# Patient Record
Sex: FEMALE | Race: WHITE | NOT HISPANIC OR LATINO | RURAL
[De-identification: names, ages, dates, MRNs, and addresses within clinical notes are randomized per-mention and may not be internally consistent; named-entity substitution may affect disease eponyms.]

---

## 2024-10-21 ENCOUNTER — HOSPITAL ENCOUNTER (OUTPATIENT)
Dept: RADIOLOGY | Facility: HOSPITAL | Age: 84
Discharge: HOME | End: 2024-10-21
Attending: FAMILY MEDICINE
Payer: MEDICARE

## 2024-10-21 DIAGNOSIS — R91.1 SOLITARY PULMONARY NODULE: ICD-10-CM

## 2024-10-21 PROCEDURE — 71250 CT THORAX DX C-: CPT | Mod: TC

## 2024-10-21 PROCEDURE — 71250 CT THORAX DX C-: CPT | Mod: 26,,, | Performed by: RADIOLOGY

## 2024-12-24 ENCOUNTER — HOSPITAL ENCOUNTER (INPATIENT)
Facility: HOSPITAL | Age: 84
LOS: 39 days | Discharge: HOME | DRG: 561 | End: 2025-02-01
Attending: FAMILY MEDICINE | Admitting: OBSTETRICS & GYNECOLOGY
Payer: MEDICARE

## 2024-12-24 DIAGNOSIS — R53.1 GENERALIZED WEAKNESS: ICD-10-CM

## 2024-12-24 DIAGNOSIS — M62.81 MUSCULAR WEAKNESS: Primary | ICD-10-CM

## 2024-12-24 DIAGNOSIS — S72.001A CLOSED RIGHT HIP FRACTURE, INITIAL ENCOUNTER: ICD-10-CM

## 2024-12-24 LAB
ANION GAP SERPL CALCULATED.3IONS-SCNC: 12 MMOL/L (ref 7–16)
BASOPHILS # BLD AUTO: 0.04 K/UL (ref 0–0.2)
BASOPHILS NFR BLD AUTO: 0.5 % (ref 0–1)
BUN SERPL-MCNC: 19 MG/DL (ref 10–20)
BUN/CREAT SERPL: 27 (ref 6–20)
CALCIUM SERPL-MCNC: 9.2 MG/DL (ref 8.4–10.2)
CHLORIDE SERPL-SCNC: 102 MMOL/L (ref 98–107)
CO2 SERPL-SCNC: 29 MMOL/L (ref 23–31)
CREAT SERPL-MCNC: 0.7 MG/DL (ref 0.55–1.02)
DIFFERENTIAL METHOD BLD: ABNORMAL
EGFR (NO RACE VARIABLE) (RUSH/TITUS): 85 ML/MIN/1.73M2
EOSINOPHIL # BLD AUTO: 0.21 K/UL (ref 0–0.5)
EOSINOPHIL NFR BLD AUTO: 2.6 % (ref 1–4)
ERYTHROCYTE [DISTWIDTH] IN BLOOD BY AUTOMATED COUNT: 13.8 % (ref 11.5–14.5)
GLUCOSE SERPL-MCNC: 100 MG/DL (ref 82–115)
HCT VFR BLD AUTO: 32 % (ref 38–47)
HGB BLD-MCNC: 10.6 G/DL (ref 12–16)
IMM GRANULOCYTES # BLD AUTO: 0.03 K/UL (ref 0–0.04)
IMM GRANULOCYTES NFR BLD: 0.4 % (ref 0–0.4)
LYMPHOCYTES # BLD AUTO: 1.07 K/UL (ref 1–4.8)
LYMPHOCYTES NFR BLD AUTO: 13.3 % (ref 27–41)
MCH RBC QN AUTO: 29.9 PG (ref 27–31)
MCHC RBC AUTO-ENTMCNC: 33.1 G/DL (ref 32–36)
MCV RBC AUTO: 90.1 FL (ref 80–96)
MONOCYTES # BLD AUTO: 0.58 K/UL (ref 0–0.8)
MONOCYTES NFR BLD AUTO: 7.2 % (ref 2–6)
MPC BLD CALC-MCNC: 10 FL (ref 9.4–12.4)
NEUTROPHILS # BLD AUTO: 6.09 K/UL (ref 1.8–7.7)
NEUTROPHILS NFR BLD AUTO: 76 % (ref 53–65)
NRBC # BLD AUTO: 0 X10E3/UL
NRBC, AUTO (.00): 0 %
PLATELET # BLD AUTO: 188 K/UL (ref 150–400)
POTASSIUM SERPL-SCNC: 3.9 MMOL/L (ref 3.5–5.1)
RBC # BLD AUTO: 3.55 M/UL (ref 4.2–5.4)
SODIUM SERPL-SCNC: 139 MMOL/L (ref 136–145)
WBC # BLD AUTO: 8.02 K/UL (ref 4.5–11)

## 2024-12-24 PROCEDURE — 80048 BASIC METABOLIC PNL TOTAL CA: CPT | Performed by: OBSTETRICS & GYNECOLOGY

## 2024-12-24 PROCEDURE — 94761 N-INVAS EAR/PLS OXIMETRY MLT: CPT

## 2024-12-24 PROCEDURE — 94799 UNLISTED PULMONARY SVC/PX: CPT

## 2024-12-24 PROCEDURE — 36415 COLL VENOUS BLD VENIPUNCTURE: CPT | Performed by: OBSTETRICS & GYNECOLOGY

## 2024-12-24 PROCEDURE — 99900035 HC TECH TIME PER 15 MIN (STAT)

## 2024-12-24 PROCEDURE — 85025 COMPLETE CBC W/AUTO DIFF WBC: CPT | Performed by: OBSTETRICS & GYNECOLOGY

## 2024-12-24 PROCEDURE — 11000004 HC SNF PRIVATE

## 2024-12-24 RX ORDER — ACETAMINOPHEN 325 MG/1
650 TABLET ORAL EVERY 6 HOURS PRN
Status: DISCONTINUED | OUTPATIENT
Start: 2024-12-24 | End: 2025-02-01 | Stop reason: HOSPADM

## 2024-12-24 RX ORDER — ACETAMINOPHEN 500 MG
2 TABLET ORAL EVERY 8 HOURS
COMMUNITY
Start: 2024-12-24

## 2024-12-24 RX ORDER — POLYETHYLENE GLYCOL 3350 17 G/17G
17 POWDER, FOR SOLUTION ORAL DAILY
COMMUNITY
Start: 2024-02-02

## 2024-12-24 RX ORDER — CALCIUM CARBONATE 200(500)MG
500 TABLET,CHEWABLE ORAL 2 TIMES DAILY PRN
Status: DISCONTINUED | OUTPATIENT
Start: 2024-12-24 | End: 2025-02-01 | Stop reason: HOSPADM

## 2024-12-24 RX ORDER — AZITHROMYCIN 250 MG/1
250 TABLET, FILM COATED ORAL
Status: ON HOLD | COMMUNITY
Start: 2024-12-09 | End: 2024-12-25 | Stop reason: CLARIF

## 2024-12-24 RX ORDER — DOXYCYCLINE 100 MG/1
100 CAPSULE ORAL EVERY 12 HOURS
Status: ON HOLD | COMMUNITY
Start: 2024-12-25 | End: 2025-01-31 | Stop reason: HOSPADM

## 2024-12-24 RX ORDER — APIXABAN 2.5 MG/1
1 TABLET, FILM COATED ORAL 2 TIMES DAILY
COMMUNITY
Start: 2024-02-01

## 2024-12-24 RX ORDER — ROSUVASTATIN CALCIUM 5 MG/1
1 TABLET, COATED ORAL DAILY
COMMUNITY

## 2024-12-24 RX ORDER — MULTIVITAMIN
1 TABLET ORAL DAILY
COMMUNITY

## 2024-12-24 RX ORDER — NAPROXEN SODIUM 220 MG/1
1 TABLET, FILM COATED ORAL DAILY
COMMUNITY
Start: 2024-02-02

## 2024-12-24 RX ORDER — TALC
6 POWDER (GRAM) TOPICAL NIGHTLY PRN
Status: DISCONTINUED | OUTPATIENT
Start: 2024-12-24 | End: 2025-02-01 | Stop reason: HOSPADM

## 2024-12-24 RX ORDER — AMOXICILLIN 250 MG
1 CAPSULE ORAL 2 TIMES DAILY
Status: DISCONTINUED | OUTPATIENT
Start: 2024-12-24 | End: 2025-01-14

## 2024-12-24 RX ORDER — DONEPEZIL HYDROCHLORIDE 5 MG/1
1 TABLET, FILM COATED ORAL NIGHTLY
COMMUNITY
Start: 2024-02-01

## 2024-12-24 NOTE — H&P
[facility]  Medical Surgical Unit    History & Physical      Patient Name: Vy Hussein  MRN: [id number]  Admission Date: 12/24/2024  Attending Physician:  Dr Castro  Primary Care Provider: Flora Brennan MD         Patient information was obtained from patient, relative(s), and past medical records.     Subjective:     Principal Problem:<principal problem not specified>  Presents for swing bed therapy.  Chief Complaint: No chief complaint on file.   Recovering from ortho procedure 12/22/24 [facility]    HPI: 85yo who had a closed right hip fracture s/p ground level fall on 12/21/24 and subsequent   Femoral trochanteric nailing 12/22/24 presenting for swing bed.  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     Current Outpatient Medications on File Prior to Encounter   Medication Sig    acetaminophen (TYLENOL) 500 MG tablet Take 2 tablets by mouth every 8 (eight) hours.    aspirin 81 MG Chew Take 1 tablet by mouth once daily.    azithromycin (Z-KYLE) 250 MG tablet Take 250 mg by mouth every Mon, Wed, Fri.    donepeziL (ARICEPT) 5 MG tablet Take 1 tablet by mouth every evening.    ELIQUIS 2.5 mg Tab Take 1 tablet by mouth 2 (two) times daily.    polyethylene glycol (GLYCOLAX) 17 gram PwPk Take 17 g by mouth once daily.    multivitamin (THERAGRAN) per tablet Take 1 tablet by mouth once daily.    rosuvastatin  (CRESTOR) 5 MG tablet Take 1 tablet by mouth once daily.     Family History    None       Tobacco Use    Smoking status: Never    Smokeless tobacco: Never   Substance and Sexual Activity    Alcohol use: Never    Drug use: Never    Sexual activity: Not Currently     Review of Systems   Constitutional: Negative.    HENT: Negative.     Eyes: Negative.    Respiratory: Negative.     Cardiovascular: Negative.    Gastrointestinal: Negative.    Endocrine: Negative.    Genitourinary: Negative.    Musculoskeletal:  Positive for gait problem (right hip surgery).   Skin: Negative.    Allergic/Immunologic: Negative.    Neurological:  Positive for speech difficulty (expressive aphasia). Negative for dizziness, facial asymmetry, numbness and headaches.     Objective:     Vital Signs (Most Recent):  Temp: 98.1 °F (36.7 °C) (12/24/24 1530)  Pulse: 86 (12/24/24 1530)  Resp: 18 (12/24/24 1530)  BP: 123/74 (12/24/24 1530)  SpO2: 96 % (12/24/24 1530) Vital Signs (24h Range):  Temp:  [98.1 °F (36.7 °C)] 98.1 °F (36.7 °C)  Pulse:  [86] 86  Resp:  [18] 18  SpO2:  [96 %] 96 %  BP: (123)/(74) 123/74     Weight: 43 kg (94 lb 12.8 oz)  Body mass index is 15.3 kg/m².    Physical Exam  Vitals and nursing note reviewed.   HENT:      Head: Normocephalic and atraumatic.      Nose: Nose normal.      Mouth/Throat:      Mouth: Mucous membranes are moist.   Eyes:      Conjunctiva/sclera: Conjunctivae normal.      Pupils: Pupils are equal, round, and reactive to light.   Cardiovascular:      Rate and Rhythm: Rhythm irregular.      Pulses: Normal pulses.   Pulmonary:      Effort: Pulmonary effort is normal.   Abdominal:      General: Abdomen is flat.   Musculoskeletal:      Cervical back: Neck supple.      Comments: Right femoral trochanteric nailing  for right hip trochanteric femur fracture.   Skin:     General: Skin is warm.   Neurological:      Mental Status: She is alert.      Comments: Expressive aphasia     Judd to gravity      CRANIAL NERVES      CN III, IV, VI   Pupils are equal, round, and reactive to light.      Significant Labs: All pertinent labs within the past 24 hours have been reviewed.    Significant Imaging: I have reviewed all pertinent imaging results/findings within the past 24 hours.    Assessment/Plan:   Post op  day # 2. S/p right femoral trochanteric nailing  There are no hospital problems to display for this patient.    VTE Risk Mitigation (From admission, onward)           Ordered     IP VTE HIGH RISK PATIENT  Once         12/24/24 1544     Place COLT hose  Until discontinued         12/24/24 1544                Admit for swing bed eval and therapy.      Elizabeth Castro MD  Department of Hospital Medicine   [facility] - Medical Surgical Unit

## 2024-12-25 PROBLEM — M62.81 MUSCULAR WEAKNESS: Status: ACTIVE | Noted: 2024-12-25

## 2024-12-25 LAB
AMORPH PHOS CRY #/AREA URNS LPF: ABNORMAL /LPF
BACTERIA #/AREA URNS HPF: ABNORMAL /HPF
BILIRUB UR QL STRIP: NEGATIVE
CLARITY UR: CLEAR
COLOR UR: YELLOW
GLUCOSE UR STRIP-MCNC: NEGATIVE MG/DL
KETONES UR STRIP-SCNC: NEGATIVE MG/DL
LEUKOCYTE ESTERASE UR QL STRIP: ABNORMAL
NITRITE UR QL STRIP: NEGATIVE
PH UR STRIP: 7 PH UNITS
PROT UR QL STRIP: NEGATIVE
RBC # UR STRIP: NEGATIVE /UL
RBC #/AREA URNS HPF: ABNORMAL /HPF
SP GR UR STRIP: 1.01
SQUAMOUS #/AREA URNS LPF: ABNORMAL /LPF
UROBILINOGEN UR STRIP-ACNC: 0.2 MG/DL
WBC #/AREA URNS HPF: ABNORMAL /HPF

## 2024-12-25 PROCEDURE — 94761 N-INVAS EAR/PLS OXIMETRY MLT: CPT

## 2024-12-25 PROCEDURE — 11000004 HC SNF PRIVATE

## 2024-12-25 PROCEDURE — 87086 URINE CULTURE/COLONY COUNT: CPT | Performed by: OBSTETRICS & GYNECOLOGY

## 2024-12-25 PROCEDURE — 81003 URINALYSIS AUTO W/O SCOPE: CPT | Performed by: OBSTETRICS & GYNECOLOGY

## 2024-12-25 PROCEDURE — 25000003 PHARM REV CODE 250: Performed by: OBSTETRICS & GYNECOLOGY

## 2024-12-25 RX ORDER — POLYETHYLENE GLYCOL 3350 17 G/17G
17 POWDER, FOR SOLUTION ORAL DAILY
Status: DISCONTINUED | OUTPATIENT
Start: 2024-12-25 | End: 2025-01-14

## 2024-12-25 RX ORDER — ATORVASTATIN CALCIUM 20 MG/1
20 TABLET, FILM COATED ORAL DAILY
Status: DISCONTINUED | OUTPATIENT
Start: 2024-12-25 | End: 2025-02-01 | Stop reason: HOSPADM

## 2024-12-25 RX ORDER — DOXYCYCLINE HYCLATE 100 MG
100 TABLET ORAL EVERY 12 HOURS
Status: DISCONTINUED | OUTPATIENT
Start: 2024-12-25 | End: 2025-01-06 | Stop reason: ALTCHOICE

## 2024-12-25 RX ORDER — DONEPEZIL HYDROCHLORIDE 5 MG/1
5 TABLET, FILM COATED ORAL NIGHTLY
Status: DISCONTINUED | OUTPATIENT
Start: 2024-12-25 | End: 2025-02-01 | Stop reason: HOSPADM

## 2024-12-25 RX ORDER — MUPIROCIN 20 MG/G
OINTMENT TOPICAL 2 TIMES DAILY
Status: DISPENSED | OUTPATIENT
Start: 2024-12-25 | End: 2024-12-30

## 2024-12-25 RX ORDER — ACETAMINOPHEN 500 MG
1000 TABLET ORAL EVERY 8 HOURS
Status: DISCONTINUED | OUTPATIENT
Start: 2024-12-25 | End: 2024-12-28

## 2024-12-25 RX ORDER — NAPROXEN SODIUM 220 MG/1
81 TABLET, FILM COATED ORAL DAILY
Status: DISCONTINUED | OUTPATIENT
Start: 2024-12-26 | End: 2025-02-01 | Stop reason: HOSPADM

## 2024-12-25 RX ADMIN — DOXYCYCLINE HYCLATE 100 MG: 100 TABLET, COATED ORAL at 02:12

## 2024-12-25 RX ADMIN — ATORVASTATIN CALCIUM 20 MG: 20 TABLET, FILM COATED ORAL at 02:12

## 2024-12-25 RX ADMIN — DONEPEZIL HYDROCHLORIDE 5 MG: 5 TABLET ORAL at 09:12

## 2024-12-25 RX ADMIN — APIXABAN 2.5 MG: 2.5 TABLET, FILM COATED ORAL at 02:12

## 2024-12-25 RX ADMIN — SENNOSIDES AND DOCUSATE SODIUM 1 TABLET: 50; 8.6 TABLET ORAL at 09:12

## 2024-12-25 RX ADMIN — MUPIROCIN 1 G: 20 OINTMENT TOPICAL at 09:12

## 2024-12-25 RX ADMIN — APIXABAN 2.5 MG: 2.5 TABLET, FILM COATED ORAL at 09:12

## 2024-12-25 RX ADMIN — ACETAMINOPHEN 1000 MG: 500 TABLET ORAL at 02:12

## 2024-12-25 RX ADMIN — DOXYCYCLINE HYCLATE 100 MG: 100 TABLET, COATED ORAL at 09:12

## 2024-12-25 NOTE — PLAN OF CARE
Problem: Adult Inpatient Plan of Care  Goal: Plan of Care Review  Outcome: Progressing  Goal: Patient-Specific Goal (Individualized)  Outcome: Progressing  Goal: Absence of Hospital-Acquired Illness or Injury  Outcome: Progressing  Goal: Optimal Comfort and Wellbeing  Outcome: Progressing  Goal: Readiness for Transition of Care  Outcome: Progressing     Problem: Skin Injury Risk Increased  Goal: Skin Health and Integrity  Outcome: Progressing     Problem: Fall Injury Risk  Goal: Absence of Fall and Fall-Related Injury  Outcome: Progressing     Problem: Infection  Goal: Absence of Infection Signs and Symptoms  Outcome: Progressing

## 2024-12-25 NOTE — NURSING
Rec'd patient lying in bed with her eyes open. Patient has expressive aphasia, but is able to make her needs known with a little patience. She gets aggravated sometimes, but she is able to do it with a little time. Patient apologized for bothering us. She was reassured that she was not a bother, and she was re instructed on the use of the call bell, and how to get help if she needs it. Patient's leg was repositioned because she stated it was hurting on the outside. Patient left comfortable. Legs elevated to help ease pain. Call bell within reach. Safety measures and care ongoing.

## 2024-12-25 NOTE — NURSING
Simpson cath removed at this time so that urine specimen may be collected. Current simpson cath reportedly inserted on 12/21 at previous facility for urinary retention. Pt unable to confirm this, she states she was ambulating and voiding independently before her fall. Encouraged pt to increase water intake. Ice and extra bottles of water provided. Nurse explained to pt reason for needing UA for SB admission protocol, pt verbalized understanding.

## 2024-12-26 LAB
ANION GAP SERPL CALCULATED.3IONS-SCNC: 10 MMOL/L (ref 7–16)
BASOPHILS # BLD AUTO: 0.07 K/UL (ref 0–0.2)
BASOPHILS NFR BLD AUTO: 0.9 % (ref 0–1)
BUN SERPL-MCNC: 13 MG/DL (ref 10–20)
BUN/CREAT SERPL: 20 (ref 6–20)
CALCIUM SERPL-MCNC: 9.3 MG/DL (ref 8.4–10.2)
CHLORIDE SERPL-SCNC: 107 MMOL/L (ref 98–107)
CO2 SERPL-SCNC: 29 MMOL/L (ref 23–31)
CREAT SERPL-MCNC: 0.66 MG/DL (ref 0.55–1.02)
DIFFERENTIAL METHOD BLD: ABNORMAL
EGFR (NO RACE VARIABLE) (RUSH/TITUS): 87 ML/MIN/1.73M2
EOSINOPHIL # BLD AUTO: 0.24 K/UL (ref 0–0.5)
EOSINOPHIL NFR BLD AUTO: 3 % (ref 1–4)
ERYTHROCYTE [DISTWIDTH] IN BLOOD BY AUTOMATED COUNT: 13.9 % (ref 11.5–14.5)
GLUCOSE SERPL-MCNC: 99 MG/DL (ref 82–115)
HCT VFR BLD AUTO: 33 % (ref 38–47)
HGB BLD-MCNC: 10.9 G/DL (ref 12–16)
IMM GRANULOCYTES # BLD AUTO: 0.03 K/UL (ref 0–0.04)
IMM GRANULOCYTES NFR BLD: 0.4 % (ref 0–0.4)
LYMPHOCYTES # BLD AUTO: 1.17 K/UL (ref 1–4.8)
LYMPHOCYTES NFR BLD AUTO: 14.5 % (ref 27–41)
MAGNESIUM SERPL-MCNC: 2 MG/DL (ref 1.6–2.6)
MCH RBC QN AUTO: 29.5 PG (ref 27–31)
MCHC RBC AUTO-ENTMCNC: 33 G/DL (ref 32–36)
MCV RBC AUTO: 89.2 FL (ref 80–96)
MONOCYTES # BLD AUTO: 0.57 K/UL (ref 0–0.8)
MONOCYTES NFR BLD AUTO: 7.1 % (ref 2–6)
MPC BLD CALC-MCNC: 9.9 FL (ref 9.4–12.4)
NEUTROPHILS # BLD AUTO: 5.99 K/UL (ref 1.8–7.7)
NEUTROPHILS NFR BLD AUTO: 74.1 % (ref 53–65)
NRBC # BLD AUTO: 0 X10E3/UL
NRBC, AUTO (.00): 0 %
PHOSPHATE SERPL-MCNC: 2.6 MG/DL (ref 2.3–4.7)
PLATELET # BLD AUTO: 255 K/UL (ref 150–400)
POTASSIUM SERPL-SCNC: 3.8 MMOL/L (ref 3.5–5.1)
RBC # BLD AUTO: 3.7 M/UL (ref 4.2–5.4)
SODIUM SERPL-SCNC: 142 MMOL/L (ref 136–145)
WBC # BLD AUTO: 8.07 K/UL (ref 4.5–11)

## 2024-12-26 PROCEDURE — 92522 EVALUATE SPEECH PRODUCTION: CPT

## 2024-12-26 PROCEDURE — 84100 ASSAY OF PHOSPHORUS: CPT | Performed by: OBSTETRICS & GYNECOLOGY

## 2024-12-26 PROCEDURE — 85025 COMPLETE CBC W/AUTO DIFF WBC: CPT | Performed by: OBSTETRICS & GYNECOLOGY

## 2024-12-26 PROCEDURE — 36415 COLL VENOUS BLD VENIPUNCTURE: CPT | Performed by: OBSTETRICS & GYNECOLOGY

## 2024-12-26 PROCEDURE — 97166 OT EVAL MOD COMPLEX 45 MIN: CPT

## 2024-12-26 PROCEDURE — 83735 ASSAY OF MAGNESIUM: CPT | Performed by: OBSTETRICS & GYNECOLOGY

## 2024-12-26 PROCEDURE — 25000003 PHARM REV CODE 250: Performed by: INTERNAL MEDICINE

## 2024-12-26 PROCEDURE — 11000004 HC SNF PRIVATE

## 2024-12-26 PROCEDURE — 80048 BASIC METABOLIC PNL TOTAL CA: CPT | Performed by: OBSTETRICS & GYNECOLOGY

## 2024-12-26 PROCEDURE — 94761 N-INVAS EAR/PLS OXIMETRY MLT: CPT

## 2024-12-26 PROCEDURE — 25000003 PHARM REV CODE 250: Performed by: OBSTETRICS & GYNECOLOGY

## 2024-12-26 PROCEDURE — 97161 PT EVAL LOW COMPLEX 20 MIN: CPT

## 2024-12-26 RX ORDER — HYDROCORTISONE 25 MG/G
CREAM TOPICAL 2 TIMES DAILY
Status: DISCONTINUED | OUTPATIENT
Start: 2024-12-26 | End: 2025-02-01 | Stop reason: HOSPADM

## 2024-12-26 RX ORDER — KETOROLAC TROMETHAMINE 15 MG/ML
15 INJECTION, SOLUTION INTRAMUSCULAR; INTRAVENOUS EVERY 6 HOURS PRN
Status: DISCONTINUED | OUTPATIENT
Start: 2024-12-26 | End: 2024-12-27

## 2024-12-26 RX ADMIN — DOXYCYCLINE HYCLATE 100 MG: 100 TABLET, COATED ORAL at 09:12

## 2024-12-26 RX ADMIN — HYDROCORTISONE: 25 CREAM TOPICAL at 09:12

## 2024-12-26 RX ADMIN — POLYETHYLENE GLYCOL 3350 17 G: 17 POWDER, FOR SOLUTION ORAL at 09:12

## 2024-12-26 RX ADMIN — APIXABAN 2.5 MG: 2.5 TABLET, FILM COATED ORAL at 09:12

## 2024-12-26 RX ADMIN — SENNOSIDES AND DOCUSATE SODIUM 1 TABLET: 50; 8.6 TABLET ORAL at 09:12

## 2024-12-26 RX ADMIN — DONEPEZIL HYDROCHLORIDE 5 MG: 5 TABLET ORAL at 09:12

## 2024-12-26 RX ADMIN — MUPIROCIN 2 G: 20 OINTMENT TOPICAL at 09:12

## 2024-12-26 RX ADMIN — ATORVASTATIN CALCIUM 20 MG: 20 TABLET, FILM COATED ORAL at 09:12

## 2024-12-26 RX ADMIN — ASPIRIN 81 MG CHEWABLE TABLET 81 MG: 81 TABLET CHEWABLE at 09:12

## 2024-12-26 RX ADMIN — THERA TABS 1 TABLET: TAB at 09:12

## 2024-12-26 RX ADMIN — MUPIROCIN 1 G: 20 OINTMENT TOPICAL at 09:12

## 2024-12-26 NOTE — PT/OT/SLP EVAL
"Speech Language Pathology Evaluation  Cognitive Communication    Patient Name:  Vy Hussein   MRN:  [id number]  Admitting Diagnosis: Muscular weakness    Recommendations:     Recommendations:                General Recommendations:  Speech/language therapy  Diet recommendations:     chopped meats  Aspiration Precautions:  none    General Precautions: Standard,    Communication strategies:  provide increased time to answer, go to room if call light pushed, and pt talks with slow speech    Assessment:     Vy Hussein is a 84 y.o. female with an SLP diagnosis of Aphasia.  She presents with expressive aphasia due to a stroke in 2024.    History:     Past Medical History:   Diagnosis Date    A-fib     Aneurysm of carotid artery     Stroke        Past Surgical History:   Procedure Laterality Date    APPENDECTOMY      FRACTURE SURGERY Left     hand    HYSTERECTOMY      JOINT REPLACEMENT         Social History: Patient lives by self. No children; has a nephew Solo    Prior Intubation HX:  na    Modified Barium Swallow: na    Chest X-Rays: see chart    Prior diet: regular diet.    Occupation/hobbies/homemaking: .being with family,       Subjective       Patient goals:  Patient will be Independently oriented x4, 5/5 therapy sessions.   Patient will answer "wh" questions with 80% accuracy Independently.   Patient will complete word finding tasks with 80% accuracy Independently.   Patient will complete divergent naming tasks with 80% accuracy Independently.   Patient will complete convergent naming tasks with 80% accuracy Independently.   Patient will complete problem solving tasks with 80% accuracy Independently.   Patient will complete verbal reasoning tasks with 80% accuracy Independently.   Patient will complete short term memory tasks with 80% accuracy Independently.     Pain/Comfort:  Pain Rating 1: 0/10    Respiratory Status: Room air    Objective:     Cognitive Status:    Memory Delayed   Problem Solving " Conclusions         Receptive Language:   Comprehension:      Conversation      Pragmatics:        Expressive Language:  Verbal:    Automatic Speech  Sentence completion    Initiation    Repetition Phrases    Naming Confrontation    Sentence formulation    Nonverbal:   Mouth        Motor Speech:  Resonance      Voice:   Quality Hoarse    Visual-Spatial:  WFL    Reading:   WFL     Written Expression:   Right handed; difficulty with writing    Treatment: 3x week    Goals:   Multidisciplinary Problems       SLP Goals       Not on file                    Plan:   Patient to be seen:  3 x/week   Plan of Care expires:  01/13/25  Plan of Care reviewed with:  patient   SLP Follow-Up:  Yes       Discharge recommendations:  Therapy Intensity Recommendations at Discharge: Moderate Intensity Therapy   Barriers to Discharge:  Level of Skilled Assistance Needed   and Safety Awareness      Time Tracking:     SLP Treatment Date:      Speech Start Time:     Speech Stop Time:        Speech Total Time (min):       Billable Minutes: Eval       12/26/2024  Jenni Corado MSCCC-SLP

## 2024-12-26 NOTE — PT/OT/SLP EVAL
Physical Therapy Evaluation    Patient Name:  Vy Hussein   MRN:  [id number]    Recommendations:     Discharge Recommendations: Low Intensity Therapy   Discharge Equipment Recommendations: bedside commode   Barriers to discharge: Decreased caregiver support    Assessment:     Vy Hussein is a 84 y.o. female admitted with a medical diagnosis of Muscular weakness.  She presents with the following impairments/functional limitations: weakness, impaired endurance, gait instability, decreased coordination, decreased lower extremity function, pain, decreased ROM, impaired balance, impaired functional mobility. Pt has limited mobility at this time. She claims that she was able to walk a little bit prior to leaving [place] but has not walked since being admitted here at [facility]. She is able to ambulate about 3 feet with RW and CGA taking very small, choppy steps. She seems motivated to get better as she will return to home alone (possibly have sitter/helper, though).     Rehab Prognosis: Good and Fair; patient would benefit from acute skilled PT services to address these deficits and reach maximum level of function.    Recent Surgery: * No surgery found *      Plan:     During this hospitalization, patient to be seen 5 x/week to address the identified rehab impairments via gait training, therapeutic activities, therapeutic exercises, neuromuscular re-education and progress toward the following goals:    Plan of Care Expires:  01/14/25    Subjective     Chief Complaint: decreased strength and mobility  Patient/Family Comments/goals: increase independence to return home  Pain/Comfort:  Pain Rating 1: 3/10  Location - Side 1: Right  Location - Orientation 1: lower  Location 1: hip  Pain Addressed 1: Pre-medicate for activity, Cessation of Activity, Reposition  Pain Rating Post-Intervention 1: 3/10    Patients cultural, spiritual, Cheondoism conflicts given the current situation: no    Living Environment:  Lives alone in  home with steps leading into home, handrail present on steps.  Prior to admission, patients level of function was independent.  Equipment used at home: walker, rolling, shower chair.  DME owned (not currently used): rolling walker and shower chair.  Upon discharge, patient will have assistance from sitter/helper several times a week.    Objective:     Communicated with LPN prior to session.  Patient found HOB elevated with bed alarm  upon PT entry to room.    General Precautions: Standard, fall  Orthopedic Precautions:RLE weight bearing as tolerated   Braces: N/A  Respiratory Status: Room air    Exams:  RLE ROM: limited hip flexion secondary to surgery; knee and ankle WFL  RLE Strength: 3/5 hip flexion, 3+/5 knee ext, 4/5 knee flexion, 4/5 ankle DF  LLE ROM: WFL  LLE Strength: WFL    Functional Mobility:  Bed Mobility:     Rolling Right: moderate assistance  Scooting: moderate assistance  Bridging: independence  Supine to Sit: moderate assistance  Sit to Supine: moderate assistance  Transfers:     Sit to Stand:  moderate assistance with rolling walker  Gait: about 3 feet with RW and CGA      AM-PAC 6 CLICK MOBILITY  Total Score:11       Treatment & Education:  1 low complexity PT evaluation    Patient left HOB elevated with call button in reach and bed alarm on.    GOALS:   Multidisciplinary Problems       Physical Therapy Goals          Problem: Physical Therapy    Goal Priority Disciplines Outcome Interventions   Physical Therapy Goal     PT, PT/OT     Description: Short term goals  1. Pt will require min assist for bed mobility  2. Pt will require min assist for sit to stand transfer   3. Pt will ambulate 25 feet with RW and CGA    Long term goals  1. Pt will be independent with all bed mobility  2. Pt will be independent/mod independent with sit to stand transfer  3. Pt will perform 5 sit to stands with only 1 UE for assistance  4. Pt will ambulate 50 feet with RW and CGA  5. Pt will be independent with  ascending and descending 2 standard height steps with handrail                       History:     Past Medical History:   Diagnosis Date    A-fib     Aneurysm of carotid artery     Stroke        Past Surgical History:   Procedure Laterality Date    APPENDECTOMY      FRACTURE SURGERY Left     hand    HYSTERECTOMY      JOINT REPLACEMENT         Time Tracking:     PT Received On: 12/26/24  PT Start Time: 0912     PT Stop Time: 0938  PT Total Time (min): 26 min     Billable Minutes: Evaluation 26 minutes    Zbigniew Corado, PT, DPT    12/26/2024

## 2024-12-26 NOTE — PLAN OF CARE
[facility] - Medical Surgical Unit  Initial Discharge Assessment       Primary Care Provider: Flora Brennan MD    Admission Diagnosis: Generalized weakness [R53.1]    Admission Date: 12/24/2024  Expected Discharge Date: 1/13/2025    Transition of Care Barriers: (P) None    Payor: MEDICARE / Plan: MEDICARE PART A & B / Product Type: Government /     Extended Emergency Contact Information  Primary Emergency Contact: PRACHI TATE  Mobile Phone: [phone]  Relation: Relative  Preferred language: English   needed? No    Discharge Plan A: (P) Home, Home Health  Discharge Plan B: (P) Home with family, Home Health    No Pharmacies Listed    Initial Assessment (most recent)       Adult Discharge Assessment - 12/26/24 0900          Discharge Assessment    Assessment Type Discharge Planning Assessment (P)      Confirmed/corrected address, phone number and insurance Yes (P)      Confirmed Demographics Correct on Facesheet (P)      Source of Information family;health record (P)      Reason For Admission right hip fx (P)      People in Home other relative(s) (P)      Facility Arrived From: [facility] (P)      Do you expect to return to your current living situation? Yes (P)      Do you have help at home or someone to help you manage your care at home? Yes (P)      Who are your caregiver(s) and their phone number(s)? Prachi Hussein(nephew) [phone] (P)      Prior to hospitilization cognitive status: Unable to Assess (P)      Current cognitive status: Alert/Oriented (P)      Walking or Climbing Stairs Difficulty no (P)      Dressing/Bathing Difficulty no (P)      Home Accessibility stairs to enter home (P)      Number of Stairs, Main Entrance three (P)      Home Layout Able to live on 1st floor (P)      Equipment Currently Used at Home shower chair (P)      Readmission within 30 days? No (P)      Patient currently being followed by outpatient case management? No (P)      Do you  currently have service(s) that help you manage your care at home? No (P)      Do you take prescription medications? Yes (P)      Do you have prescription coverage? Yes (P)      Coverage Medicare (P)      Do you have any problems affording any of your prescribed medications? No (P)      Is the patient taking medications as prescribed? yes (P)      Who is going to help you get home at discharge? Solo Hussein(nephew) [phone] (P)      How do you get to doctors appointments? car, drives self;family or friend will provide (P)      Are you on dialysis? No (P)      Do you take coumadin? No (P)      Discharge Plan A Home;Home Health (P)      Discharge Plan B Home with family;Home Health (P)      DME Needed Upon Discharge  walker, rolling (P)      Discharge Plan discussed with: -- (P)    Solo Hussein(nephew) [phone]    Transition of Care Barriers None (P)         Financial Resource Strain    How hard is it for you to pay for the very basics like food, housing, medical care, and heating? Not hard at all (P)         Housing Stability    In the last 12 months, was there a time when you were not able to pay the mortgage or rent on time? No (P)      At any time in the past 12 months, were you homeless or living in a shelter (including now)? No (P)         Transportation Needs    Has the lack of transportation kept you from medical appointments, meetings, work or from getting things needed for daily living? No (P)         Food Insecurity    Within the past 12 months, you worried that your food would run out before you got the money to buy more. Never true (P)      Within the past 12 months, the food you bought just didn't last and you didn't have money to get more. Never true (P)         Social Isolation    How often do you feel lonely or isolated from those around you?  Patient unable to answer (P)         Alcohol Use    Q1: How often do you have a drink containing alcohol? Never (P)      Q2: How many drinks  containing alcohol do you have on a typical day when you are drinking? Patient does not drink (P)      Q3: How often do you have six or more drinks on one occasion? Never (P)         Utilities    In the past 12 months has the electric, gas, oil, or water company threatened to shut off services in your home? No (P)         Health Literacy    How often do you need to have someone help you when you read instructions, pamphlets, or other written material from your doctor or pharmacy? Often (P)         OTHER    Name(s) of People in Home lives alone (P)                       Pt lived alone prior to admission and was completely independent with all ADLS. Pt uses a shower chair but no other DMEs. Pt's nephew will help her at discharge. CM will follow for any discharge needs.

## 2024-12-26 NOTE — PLAN OF CARE
Problem: Occupational Therapy  Goal: Occupational Therapy Goal  Description: STG:  Pt will perform grooming with setup  Pt will bathe with Mod I  Pt will perform UE dressing with I after set up  Pt will perform LE dressing with Mod I  Pt will sit EOB x 15 min with no assistance  Pt will transfer bed/chair/bsc with Mod I  Pt will perform standing task x 15 min with supervision assistance  Pt will tolerate 45 minutes of tx without fatigue      LT.Restore to max I with self care and mobility.    Outcome: Progressing

## 2024-12-26 NOTE — PLAN OF CARE
Problem: Adult Inpatient Plan of Care  Goal: Plan of Care Review  Outcome: Progressing  Goal: Absence of Hospital-Acquired Illness or Injury  Outcome: Progressing     Problem: Skin Injury Risk Increased  Goal: Skin Health and Integrity  Outcome: Progressing     Problem: Fall Injury Risk  Goal: Absence of Fall and Fall-Related Injury  Outcome: Progressing  Intervention: Identify and Manage Contributors  Flowsheets (Taken 12/25/2024 2045)  Self-Care Promotion:   independence encouraged   BADL personal objects within reach   BADL personal routines maintained   adaptive equipment use encouraged  Medication Review/Management: medications reviewed  Intervention: Promote Injury-Free Environment  Flowsheets (Taken 12/25/2024 2045)  Safety Promotion/Fall Prevention:   assistive device/personal item within reach   bed alarm set   diversional activities provided   lighting adjusted   instructed to call staff for mobility   medications reviewed   muscle strengthening facilitated   nonskid shoes/socks when out of bed   room near unit station   side rails raised x 3

## 2024-12-26 NOTE — NURSING
"1530 Pt used CB and stated "I peed". Nurse entered rm and checked pt's pad, small amount of light yellow urine in brief. Pt states she did not feel the urge to void until after she was done. Stated that at home she voids very frequently in small amounts. Bladder scan performed to check for retention, 39mL detected in bladder. Again encouraged pt to drink fluid and to call for assistance when she feels the urge to void, pt verbalized understanding.  "

## 2024-12-26 NOTE — PT/OT/SLP EVAL
Occupational Therapy   Evaluation    Name: Vy Hussein  MRN: [id number]  Admitting Diagnosis: Muscular weakness  Recent Surgery: * No surgery found *      Recommendations:     Discharge Recommendations: Moderate Intensity Therapy  Discharge Equipment Recommendations:  walker, rolling, hip kit  Barriers to discharge:  Decreased caregiver support    Assessment:     Vy Hussein is a 84 y.o. female with a medical diagnosis of Muscular weakness.  She presents with weakness s/p R hip pinning Performance deficits affecting function: weakness, impaired endurance, impaired self care skills, impaired functional mobility, gait instability, impaired balance.      Rehab Prognosis: Good; patient would benefit from acute skilled OT services to address these deficits and reach maximum level of function.       Plan:     Patient to be seen 5 x/week to address the above listed problems via self-care/home management, therapeutic activities, therapeutic exercises  Plan of Care Expires:    Plan of Care Reviewed with: patient    Subjective     Chief Complaint: Pt had no complaints  Patient/Family Comments/goals: return to PLOF    Occupational Profile:  Living Environment: Pt lives alone in single story home with 3 steps to enter. Pt had bathtub with grab bars and shower chair  Previous level of function: I with all ADLs. Pt was driving short distances  Roles and Routines: self care  Equipment Used at Home: shower chair, bedside commode  Assistance upon Discharge: unknown at this time    Pain/Comfort:  Pain Rating 1: 0/10    Patients cultural, spiritual, Scientology conflicts given the current situation:      Objective:     Communicated with: PT prior to session.  Patient found HOB elevated with   upon OT entry to room.    General Precautions: Standard, fall  Orthopedic Precautions:    Braces:    Respiratory Status: Room air    Occupational Performance:    Bed Mobility:    Patient completed Supine to Sit with moderate assistance  and with leg lift  Patient completed Sit to Supine with moderate assistance and with leg lift    Functional Mobility/Transfers:    Functional Mobility:     Activities of Daily Living:      Cognitive/Visual Perceptual:  Cognitive/Psychosocial Skills:     -       Follows Commands/attention:Follows multistep  commands    Physical Exam:  Upper Extremity Range of Motion:     -       Right Upper Extremity: WFL  -       Left Upper Extremity: WFL  Upper Extremity Strength:    -       Right Upper Extremity: 3-/5  -       Left Upper Extremity: 3/5    AMPAC 6 Click ADL:  AMPAC Total Score: 18    Treatment & Education:  Pt has expressive aphasia 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  Pt educated scope/role of OT practice  Importance of OOB activities with staff assist  Importance of not getting up without staff assist    Patient left HOB elevated with call button in reach    GOALS:   Multidisciplinary Problems       Occupational Therapy Goals          Problem: Occupational Therapy    Goal Priority Disciplines Outcome Interventions   Occupational Therapy Goal     OT, PT/OT Progressing    Description: STG:  Pt will perform grooming with setup  Pt will bathe with Mod I  Pt will perform UE dressing with I after set up  Pt will perform LE dressing with Mod I  Pt will sit EOB x 15 min with no assistance  Pt will transfer bed/chair/bsc with Mod I  Pt will perform standing task x 15 min with supervision assistance  Pt will tolerate 45 minutes of tx without fatigue      LT.Restore to max I with self care and mobility.                         History:     Past Medical History:   Diagnosis Date    A-fib     Aneurysm of carotid artery     Stroke          Past Surgical History:   Procedure Laterality Date    APPENDECTOMY      FRACTURE SURGERY Left     hand    HYSTERECTOMY      JOINT REPLACEMENT         Time Tracking:     OT Date of Treatment:    OT Start Time: 1200  OT Stop Time: 1215  OT Total Time (min): 15 min    Billable  Minutes:Evaluation 15    12/26/2024

## 2024-12-27 PROBLEM — F03.90 DEMENTIA: Status: ACTIVE | Noted: 2024-02-27

## 2024-12-27 PROBLEM — I72.0 CAROTID PSEUDOANEURYSM: Status: ACTIVE | Noted: 2024-01-03

## 2024-12-27 PROBLEM — I63.9 CEREBROVASCULAR ACCIDENT: Status: ACTIVE | Noted: 2024-02-27

## 2024-12-27 PROBLEM — Z98.890 HISTORY OF CAROTID ENDARTERECTOMY: Status: ACTIVE | Noted: 2024-02-27

## 2024-12-27 PROBLEM — R47.01 APHASIA: Status: ACTIVE | Noted: 2024-02-27

## 2024-12-27 PROBLEM — E44.0 MODERATE PROTEIN-CALORIE MALNUTRITION: Status: ACTIVE | Noted: 2024-01-03

## 2024-12-27 PROBLEM — R63.6 UNDERWEIGHT: Status: ACTIVE | Noted: 2024-01-03

## 2024-12-27 PROBLEM — S72.001A CLOSED RIGHT HIP FRACTURE, INITIAL ENCOUNTER: Status: ACTIVE | Noted: 2024-12-21

## 2024-12-27 PROBLEM — N18.2 STAGE 2 CHRONIC KIDNEY DISEASE: Status: ACTIVE | Noted: 2024-10-08

## 2024-12-27 PROBLEM — I77.71 INTERNAL CAROTID ARTERY DISSECTION: Status: ACTIVE | Noted: 2023-12-25

## 2024-12-27 PROBLEM — A31.0 PULMONARY INFECTION DUE TO MYCOBACTERIUM AVIUM COMPLEX: Status: ACTIVE | Noted: 2024-11-11

## 2024-12-27 PROBLEM — L02.413 ABSCESS OF ARM, RIGHT: Status: ACTIVE | Noted: 2024-01-13

## 2024-12-27 PROBLEM — R47.01 EXPRESSIVE APHASIA: Status: ACTIVE | Noted: 2024-01-12

## 2024-12-27 PROBLEM — I65.21 ICAO (INTERNAL CAROTID ARTERY OCCLUSION), RIGHT: Status: ACTIVE | Noted: 2024-01-08

## 2024-12-27 PROBLEM — Z74.09 IMPAIRED MOBILITY AND ACTIVITIES OF DAILY LIVING: Status: ACTIVE | Noted: 2024-01-04

## 2024-12-27 PROBLEM — I48.0 PAROXYSMAL ATRIAL FIBRILLATION: Status: ACTIVE | Noted: 2024-01-03

## 2024-12-27 PROBLEM — Z78.9 IMPAIRED MOBILITY AND ACTIVITIES OF DAILY LIVING: Status: ACTIVE | Noted: 2024-01-04

## 2024-12-27 PROBLEM — Z86.73 HISTORY OF CEREBROVASCULAR ACCIDENT: Status: ACTIVE | Noted: 2024-11-11

## 2024-12-27 PROBLEM — I63.9 ISCHEMIC STROKE: Status: ACTIVE | Noted: 2024-01-03

## 2024-12-27 PROCEDURE — 97110 THERAPEUTIC EXERCISES: CPT | Mod: CQ

## 2024-12-27 PROCEDURE — 97530 THERAPEUTIC ACTIVITIES: CPT | Mod: CQ

## 2024-12-27 PROCEDURE — 63600175 PHARM REV CODE 636 W HCPCS: Performed by: EMERGENCY MEDICINE

## 2024-12-27 PROCEDURE — 99308 SBSQ NF CARE LOW MDM 20: CPT | Mod: ,,, | Performed by: FAMILY MEDICINE

## 2024-12-27 PROCEDURE — 11000004 HC SNF PRIVATE

## 2024-12-27 PROCEDURE — 25000003 PHARM REV CODE 250: Performed by: INTERNAL MEDICINE

## 2024-12-27 PROCEDURE — 97116 GAIT TRAINING THERAPY: CPT | Mod: CQ

## 2024-12-27 PROCEDURE — 92522 EVALUATE SPEECH PRODUCTION: CPT

## 2024-12-27 PROCEDURE — 25000003 PHARM REV CODE 250: Performed by: OBSTETRICS & GYNECOLOGY

## 2024-12-27 PROCEDURE — 25000003 PHARM REV CODE 250: Performed by: EMERGENCY MEDICINE

## 2024-12-27 PROCEDURE — 94761 N-INVAS EAR/PLS OXIMETRY MLT: CPT

## 2024-12-27 PROCEDURE — 92507 TX SP LANG VOICE COMM INDIV: CPT

## 2024-12-27 RX ORDER — KETOROLAC TROMETHAMINE 30 MG/ML
30 INJECTION, SOLUTION INTRAMUSCULAR; INTRAVENOUS ONCE
Status: COMPLETED | OUTPATIENT
Start: 2024-12-27 | End: 2024-12-27

## 2024-12-27 RX ORDER — HYDROCODONE BITARTRATE AND ACETAMINOPHEN 5; 325 MG/1; MG/1
1 TABLET ORAL EVERY 6 HOURS PRN
Status: DISCONTINUED | OUTPATIENT
Start: 2024-12-27 | End: 2025-02-01 | Stop reason: HOSPADM

## 2024-12-27 RX ADMIN — THERA TABS 1 TABLET: TAB at 09:12

## 2024-12-27 RX ADMIN — HYDROCORTISONE: 25 CREAM TOPICAL at 09:12

## 2024-12-27 RX ADMIN — KETOROLAC TROMETHAMINE 30 MG: 30 INJECTION, SOLUTION INTRAMUSCULAR at 09:12

## 2024-12-27 RX ADMIN — DOXYCYCLINE HYCLATE 100 MG: 100 TABLET, COATED ORAL at 09:12

## 2024-12-27 RX ADMIN — SENNOSIDES AND DOCUSATE SODIUM 1 TABLET: 50; 8.6 TABLET ORAL at 09:12

## 2024-12-27 RX ADMIN — HYDROCODONE BITARTRATE AND ACETAMINOPHEN 1 TABLET: 5; 325 TABLET ORAL at 08:12

## 2024-12-27 RX ADMIN — DOXYCYCLINE HYCLATE 100 MG: 100 TABLET, COATED ORAL at 08:12

## 2024-12-27 RX ADMIN — DONEPEZIL HYDROCHLORIDE 5 MG: 5 TABLET ORAL at 08:12

## 2024-12-27 RX ADMIN — HYDROCORTISONE: 25 CREAM TOPICAL at 08:12

## 2024-12-27 RX ADMIN — MUPIROCIN 1 G: 20 OINTMENT TOPICAL at 09:12

## 2024-12-27 RX ADMIN — POLYETHYLENE GLYCOL 3350 17 G: 17 POWDER, FOR SOLUTION ORAL at 09:12

## 2024-12-27 RX ADMIN — ATORVASTATIN CALCIUM 20 MG: 20 TABLET, FILM COATED ORAL at 09:12

## 2024-12-27 RX ADMIN — SENNOSIDES AND DOCUSATE SODIUM 1 TABLET: 50; 8.6 TABLET ORAL at 08:12

## 2024-12-27 RX ADMIN — APIXABAN 2.5 MG: 2.5 TABLET, FILM COATED ORAL at 08:12

## 2024-12-27 RX ADMIN — APIXABAN 2.5 MG: 2.5 TABLET, FILM COATED ORAL at 09:12

## 2024-12-27 RX ADMIN — ASPIRIN 81 MG CHEWABLE TABLET 81 MG: 81 TABLET CHEWABLE at 09:12

## 2024-12-27 RX ADMIN — MUPIROCIN 1 G: 20 OINTMENT TOPICAL at 08:12

## 2024-12-27 NOTE — PT/OT/SLP PROGRESS
Physical Therapy Treatment    Patient Name:  Vy Hussein   MRN:  [id number]    Recommendations:     Discharge Recommendations: Moderate Intensity Therapy  Discharge Equipment Recommendations: 3-in-1 commode  Barriers to discharge: Decreased caregiver support    Assessment:     Vy Hussein is a 84 y.o. female admitted with a medical diagnosis of Muscular weakness.  She presents with the following impairments/functional limitations: weakness, impaired endurance, impaired self care skills, impaired functional mobility, gait instability, impaired balance, decreased upper extremity function, decreased lower extremity function, pain, orthopedic precautions .  Patient requires mod assistance to transfer supine to sit to EOB.  Patient seems to be apprehensive of pain in Right LE during tranfers and gait training.  Patient is motivated throughout PT treatment session.     Rehab Prognosis: Good and Fair; patient would benefit from acute skilled PT services to address these deficits and reach maximum level of function.    Recent Surgery: * No surgery found *      Plan:     During this hospitalization, patient to be seen 5 x/week to address the identified rehab impairments via gait training, therapeutic exercises, therapeutic activities and progress toward the following goals:    Plan of Care Expires:  01/14/25    Subjective     Chief Complaint: Decreased strength and mobility   Patient/Family Comments/goals: Increase independence to return home   Pain/Comfort:  Pain Rating 1: 0/10      Objective:     Communicated with skilled nursing and supervising physical therapist, Zbigniew Corado DPT  prior to session.  Patient found HOB elevated with   upon PT entry to room.     General Precautions: Standard, fall  Orthopedic Precautions: RLE weight bearing as tolerated  Braces: N/A  Respiratory Status: Room air     Functional Mobility:  Bed Mobility:     Rolling Right: minimum assistance  Scooting: moderate assistance  Supine to  "Sit: moderate assistance  Transfers:     Sit to Stand:  moderate assistance with rolling walker  Gait: Approx. 10' + 45' with RW and mod assist x 1 on level indoor surface with verbal and visual cues for proper step length and walker placement.        AM-PAC 6 CLICK MOBILITY  Turning over in bed (including adjusting bedclothes, sheets and blankets)?: 2  Sitting down on and standing up from a chair with arms (e.g., wheelchair, bedside commode, etc.): 2  Moving from lying on back to sitting on the side of the bed?: 2  Moving to and from a bed to a chair (including a wheelchair)?: 2  Need to walk in hospital room?: 2  Climbing 3-5 steps with a railing?: 1  Basic Mobility Total Score: 11       Treatment & Education:  Ther-Ex Reps       Ankle pumps 30 x    Quad sets 30 x 3"   Horizontal Hip Abduction/Hip ADD 2 x 10   Hip ADD 30 x 3" *    Heelslides    LAQ's 2 x 10   Hamstring curls    Glut sets  30 x 3"    Hip ABD                     Patient left up in chair with call button in reach..    GOALS:   Multidisciplinary Problems       Physical Therapy Goals          Problem: Physical Therapy    Goal Priority Disciplines Outcome Interventions   Physical Therapy Goal     PT, PT/OT     Description: Short term goals  1. Pt will require min assist for bed mobility  2. Pt will require min assist for sit to stand transfer   3. Pt will ambulate 25 feet with RW and CGA    Long term goals  1. Pt will be independent with all bed mobility  2. Pt will be independent/mod independent with sit to stand transfer  3. Pt will perform 5 sit to stands with only 1 UE for assistance  4. Pt will ambulate 50 feet with RW and CGA  5. Pt will be independent with ascending and descending 2 standard height steps with handrail                       Time Tracking:     PT Received On: 12/27/24  PT Start Time: 1322     PT Stop Time: 1407  PT Total Time (min): 45 min     Billable Minutes: Gait Training 10, Therapeutic Activity 10, and Therapeutic Exercise " 25    Treatment Type: Treatment  PT/PTA: PTA     Number of PTA visits since last PT visit: 1   Continue Plan of Care per PT order to progress patient toward rehab goals as tolerated by patient.  PERLA Rosenthal   12/27/2024

## 2024-12-27 NOTE — HOSPITAL COURSE
12/27/24 - up in chair. No complaints voiced.    12/30/24 - doing well today. No complaints voiced.    1/3/25 - has a cough this AM. Has cough syrup ordered. Will continue present treatment.    1/6/25 - doing well. To have staples removed today.    1/10/25 - doing well. Will continue present treatment.    1/13/25 - doing well. Will continue present treatment.    1/14/25 - had 3 diarrheal stools last night. Orders revised.    1/15/25 - still with diarrhea. Orders revised.    1/17/25 - feels better. Hemoglobin has dropped. Orders revised.    1/20/25 - doing well. No complaints.    1/24/25 - pt. Is doing well. Will continue present treatment.    1/27/25 - doing well no complaints voiced.    1/31/25 - pt. Is going home tomorrow. Has done well. Will continue therapy at home.

## 2024-12-27 NOTE — PLAN OF CARE
Per pt's nephew Solo, pt's insurance will swap to [employer] on 1/1/25. He will bring pt's new card as soon as he gets it.

## 2024-12-27 NOTE — PROGRESS NOTES
[facility]  Hospital Medicine  Progress Note    Patient Name: Vy Hussein  MRN: [id number]  Patient Class: IP- Swing   Admission Date: 12/24/2024  Length of Stay: 3 days  Attending Physician: Elizabeth Castro MD  Primary Care Provider: Flora Brennan MD        Subjective     Principal Problem:Muscular weakness        HPI:  No notes on file    Overview/Hospital Course:  12/27/24 - up in chair. No complaints voiced.    Interval History: will continue present therapy.    Review of Systems  Objective:     Vital Signs (Most Recent):  Temp: 97.5 °F (36.4 °C) (12/27/24 0757)  Pulse: 99 (12/27/24 0757)  Resp: 18 (12/27/24 0757)  BP: 108/66 (12/27/24 0757)  SpO2: 97 % (12/27/24 0757) Vital Signs (24h Range):  Temp:  [97.5 °F (36.4 °C)-97.9 °F (36.6 °C)] 97.5 °F (36.4 °C)  Pulse:  [84-99] 99  Resp:  [17-18] 18  SpO2:  [95 %-98 %] 97 %  BP: (108-144)/(66-89) 108/66     Weight: 43 kg (94 lb 12.8 oz)  Body mass index is 15.3 kg/m².    Intake/Output Summary (Last 24 hours) at 12/27/2024 0824  Last data filed at 12/27/2024 0600  Gross per 24 hour   Intake 370 ml   Output 200 ml   Net 170 ml         Physical Exam        Significant Labs: All pertinent labs within the past 24 hours have been reviewed.    Significant Imaging: I have reviewed all pertinent imaging results/findings within the past 24 hours.    Assessment and Plan     No notes have been filed under this hospital service.  Service: Hospital Medicine    VTE Risk Mitigation (From admission, onward)           Ordered     apixaban tablet 2.5 mg  2 times daily         12/25/24 1056     IP VTE HIGH RISK PATIENT  Once         12/24/24 1544     Place COLT hose  Until discontinued         12/24/24 1544                    Discharge Planning   REESE: 1/13/2025     Code Status: Full Code   Medical Readiness for Discharge Date:   Discharge Plan A: Home, Home Health                Please place Justification for NIRMALA SALDANA  MD Ashtyn  Department of Hospital Medicine   [facility]

## 2024-12-27 NOTE — SUBJECTIVE & OBJECTIVE
Interval History: will continue present therapy.    Review of Systems  Objective:     Vital Signs (Most Recent):  Temp: 97.5 °F (36.4 °C) (12/27/24 0757)  Pulse: 99 (12/27/24 0757)  Resp: 18 (12/27/24 0757)  BP: 108/66 (12/27/24 0757)  SpO2: 97 % (12/27/24 0757) Vital Signs (24h Range):  Temp:  [97.5 °F (36.4 °C)-97.9 °F (36.6 °C)] 97.5 °F (36.4 °C)  Pulse:  [84-99] 99  Resp:  [17-18] 18  SpO2:  [95 %-98 %] 97 %  BP: (108-144)/(66-89) 108/66     Weight: 43 kg (94 lb 12.8 oz)  Body mass index is 15.3 kg/m².    Intake/Output Summary (Last 24 hours) at 12/27/2024 0824  Last data filed at 12/27/2024 0600  Gross per 24 hour   Intake 370 ml   Output 200 ml   Net 170 ml         Physical Exam        Significant Labs: All pertinent labs within the past 24 hours have been reviewed.    Significant Imaging: I have reviewed all pertinent imaging results/findings within the past 24 hours.

## 2024-12-27 NOTE — PT/OT/SLP PROGRESS
"Speech Language Pathology Treatment    Patient Name:  Vy Hussein   MRN:  [id number]  Admitting Diagnosis: Muscular weakness    Recommendations:                 General Recommendations:  Speech/language therapy  Diet recommendations:   ,     Aspiration Precautions:       General Precautions: Standard,    Communication strategies:  provide increased time to answer and go to room if call light pushed    Assessment:     Vy Hussein is a 84 y.o. female with an SLP diagnosis of Aphasia.  She presents with expressive aphasia.    Subjective       Patient goals: Patient will be Independently oriented x4, 5/5 therapy sessions.   Patient answered "wh" questions with 70% accuracy Independently.   Patient completed word finding tasks with 65% accuracy Independently.   Patient completed divergent naming tasks with 70% accuracy Independently.   Patient completed convergent naming tasks with 70% accuracy Independently.   Patient completed problem solving tasks with 70% accuracy Independently.   Patient completed verbal reasoning tasks with 70% accuracy Independently.   Patient completed short term memory tasks with 75% accuracy Independently.      Pain/Comfort:  Pain Rating 1: 0/10    Respiratory Status: Room air    Objective:     Has the patient been evaluated by SLP for swallowing?   No  Keep patient NPO? No   Current Respiratory Status:            Goals:   Multidisciplinary Problems       SLP Goals       Not on file                    Plan:     Patient to be seen:  3 x/week   Plan of Care expires:  01/13/25  Plan of Care reviewed with:  patient   SLP Follow-Up:  Yes       Discharge recommendations:  Moderate Intensity Therapy   Barriers to Discharge:  Safety Awareness      Time Tracking:     SLP Treatment Date:   12/27/24  Speech Start Time:  1150  Speech Stop Time:  1240     Speech Total Time (min):  50 min    Billable Minutes: Speech Therapy Individual      12/27/2024    Jenni Corado MSCCC-SLP    "

## 2024-12-27 NOTE — PROGRESS NOTES
[facility]  Hospital Medicine  Progress Note    Patient Name: Vy Hussein  MRN: [id number]  Patient Class: IP- Swing   Admission Date: 12/24/2024  Length of Stay: 3 days  Attending Physician: Elizabeth Castro MD  Primary Care Provider: Flora Brennan MD        Subjective     Principal Problem:Muscular weakness        HPI:  No notes on file    Overview/Hospital Course:  12/27/24 - up in chair. No complaints voiced.    No new subjective & objective note has been filed under this hospital service since the last note was generated.      Assessment and Plan     No notes have been filed under this hospital service.  Service: Hospital Medicine    VTE Risk Mitigation (From admission, onward)           Ordered     apixaban tablet 2.5 mg  2 times daily         12/25/24 1056     IP VTE HIGH RISK PATIENT  Once         12/24/24 1544     Place COLT hose  Until discontinued         12/24/24 1544                    Discharge Planning   REESE: 1/13/2025     Code Status: Full Code   Medical Readiness for Discharge Date:   Discharge Plan A: Home, Home Health                Please place Justification for DME        Ema Chamorro MD  Department of Hospital Medicine   [facility]

## 2024-12-28 LAB — UA COMPLETE W REFLEX CULTURE PNL UR: ABNORMAL

## 2024-12-28 PROCEDURE — 25000003 PHARM REV CODE 250: Performed by: EMERGENCY MEDICINE

## 2024-12-28 PROCEDURE — 97110 THERAPEUTIC EXERCISES: CPT | Mod: CO

## 2024-12-28 PROCEDURE — 94761 N-INVAS EAR/PLS OXIMETRY MLT: CPT

## 2024-12-28 PROCEDURE — 25000003 PHARM REV CODE 250: Performed by: OBSTETRICS & GYNECOLOGY

## 2024-12-28 PROCEDURE — 11000004 HC SNF PRIVATE

## 2024-12-28 RX ADMIN — THERA TABS 1 TABLET: TAB at 10:12

## 2024-12-28 RX ADMIN — MUPIROCIN 1 G: 20 OINTMENT TOPICAL at 10:12

## 2024-12-28 RX ADMIN — DOXYCYCLINE HYCLATE 100 MG: 100 TABLET, COATED ORAL at 10:12

## 2024-12-28 RX ADMIN — ATORVASTATIN CALCIUM 20 MG: 20 TABLET, FILM COATED ORAL at 10:12

## 2024-12-28 RX ADMIN — ASPIRIN 81 MG CHEWABLE TABLET 81 MG: 81 TABLET CHEWABLE at 10:12

## 2024-12-28 RX ADMIN — DOXYCYCLINE HYCLATE 100 MG: 100 TABLET, COATED ORAL at 08:12

## 2024-12-28 RX ADMIN — MUPIROCIN 1 G: 20 OINTMENT TOPICAL at 08:12

## 2024-12-28 RX ADMIN — HYDROCORTISONE: 25 CREAM TOPICAL at 10:12

## 2024-12-28 RX ADMIN — DONEPEZIL HYDROCHLORIDE 5 MG: 5 TABLET ORAL at 08:12

## 2024-12-28 RX ADMIN — HYDROCODONE BITARTRATE AND ACETAMINOPHEN 1 TABLET: 5; 325 TABLET ORAL at 04:12

## 2024-12-28 RX ADMIN — APIXABAN 2.5 MG: 2.5 TABLET, FILM COATED ORAL at 10:12

## 2024-12-28 RX ADMIN — POLYETHYLENE GLYCOL 3350 17 G: 17 POWDER, FOR SOLUTION ORAL at 10:12

## 2024-12-28 RX ADMIN — HYDROCODONE BITARTRATE AND ACETAMINOPHEN 1 TABLET: 5; 325 TABLET ORAL at 11:12

## 2024-12-28 RX ADMIN — APIXABAN 2.5 MG: 2.5 TABLET, FILM COATED ORAL at 08:12

## 2024-12-28 RX ADMIN — SENNOSIDES AND DOCUSATE SODIUM 1 TABLET: 50; 8.6 TABLET ORAL at 10:12

## 2024-12-28 RX ADMIN — HYDROCORTISONE: 25 CREAM TOPICAL at 08:12

## 2024-12-28 NOTE — PT/OT/SLP PROGRESS
"Occupational Therapy  Treatment    Vy Hussein   MRN: [id number]   Admitting Diagnosis: Muscular weakness    OT Date of Treatment: 12/28/24   OT Start Time: 0805  OT Stop Time: 0905  OT Total Time (min): 60 min    Billable Minutes:  60 mins    OT/PHILIP: PHILIP     Number of PHILIP visits since last OT visit: 0    General Precautions: Standard, fall  Orthopedic Precautions:    Braces:    Respiratory Status:          Subjective:  "I had a good night and I'm doing fine today".  Pain/Comfort  Pain Rating 1: 0/10    Objective:        Functional Mobility:  Bed Mobility:       Transfers:        Functional Ambulation:     Activities of Daily Living:     Feeding adaptive equipment: None     UE adaptive equipment:      LE adaptive equipment:                     Bathing adaptive equipment:     Balance:   Static Sit:   Dynamic Sit:   Static Stand:   Dynamic stand:     Therapeutic Activities and Exercises:  Patient performed 15 reps x5 sets of shoulder and elbow flex/ext and shoulder protraction/retraction exercises using the 2 lb dowel marquise for increased BUE strength and endurance. Patient performed task while lying in bed with rest breaks and no complaints.    AM-PAC 6 CLICK ADL   How much help from another person does this patient currently need?   1 = Unable, Total/Dependent Assistance  2 = A lot, Maximum/Moderate Assistance  3 = A little, Minimum/Contact Guard/Supervision  4 = None, Modified [place]/Independent    Putting on and taking off regular lower body clothing? : 2  Bathing (including washing, rinsing, drying)?: 2  Toileting, which includes using toilet, bedpan, or urinal? : 2  Putting on and taking off regular upper body clothing?: 3  Taking care of personal grooming such as brushing teeth?: 3  Eating meals?: 4  Daily Activity Total Score: 16     AM-PAC Raw Score CMS "G-Code Modifier Level of Impairment Assistance   6 % Total / Unable   7 - 8 CM 80 - 100% Maximal Assist   9-13 CL 60 - 80% Moderate Assist   14 " - 19 CK 40 - 60% Moderate Assist   20 - 22 CJ 20 - 40% Minimal Assist   23 CI 1-20% SBA / CGA   24 CH 0% Independent/ Mod I       Patient left lying in bed with call light within reach.    ASSESSMENT:  Vy Hussein is a 84 y.o. female with a medical diagnosis of Muscular weakness and presents with .    Rehab identified problem list/impairments:  weakness, impaired endurance, impaired self care skills, impaired functional mobility, gait instability, impaired balance    Rehab potential is Good.  Activity tolerance: Good    Discharge recommendations: Moderate Intensity Therapy   Barriers to discharge:      Equipment recommendations: 3-in-1 commode, walker, rolling, slide board    GOALS:   Multidisciplinary Problems       Occupational Therapy Goals          Problem: Occupational Therapy    Goal Priority Disciplines Outcome Interventions   Occupational Therapy Goal     OT, PT/OT Progressing    Description: STG:  Pt will perform grooming with setup  Pt will bathe with Mod I  Pt will perform UE dressing with I after set up  Pt will perform LE dressing with Mod I  Pt will sit EOB x 15 min with no assistance  Pt will transfer bed/chair/bsc with Mod I  Pt will perform standing task x 15 min with supervision assistance  Pt will tolerate 45 minutes of tx without fatigue      LT.Restore to max I with self care and mobility.                         Plan:  Patient to be seen 5 x/week to address the above listed problems via self-care/home management, therapeutic activities, therapeutic exercises  Plan of Care expires:    Plan of Care reviewed with: patient     JORGITO Ramos    2024

## 2024-12-28 NOTE — PLAN OF CARE
Problem: Adult Inpatient Plan of Care  Goal: Plan of Care Review  Outcome: Progressing  Goal: Patient-Specific Goal (Individualized)  Outcome: Progressing  Goal: Absence of Hospital-Acquired Illness or Injury  Outcome: Progressing     Problem: Skin Injury Risk Increased  Goal: Skin Health and Integrity  Outcome: Progressing     Problem: Fall Injury Risk  Goal: Absence of Fall and Fall-Related Injury  Outcome: Progressing  Intervention: Identify and Manage Contributors  Flowsheets (Taken 12/27/2024 2136)  Self-Care Promotion:   independence encouraged   BADL personal objects within reach   BADL personal routines maintained   adaptive equipment use encouraged  Medication Review/Management: medications reviewed  Intervention: Promote Injury-Free Environment  Flowsheets (Taken 12/27/2024 2136)  Safety Promotion/Fall Prevention:   assistive device/personal item within reach   bed alarm set   medications reviewed   instructed to call staff for mobility   lighting adjusted   muscle strengthening facilitated   nonskid shoes/socks when out of bed   side rails raised x 3

## 2024-12-29 PROCEDURE — 97110 THERAPEUTIC EXERCISES: CPT | Mod: CO

## 2024-12-29 PROCEDURE — 25000003 PHARM REV CODE 250: Performed by: EMERGENCY MEDICINE

## 2024-12-29 PROCEDURE — 94761 N-INVAS EAR/PLS OXIMETRY MLT: CPT

## 2024-12-29 PROCEDURE — 11000004 HC SNF PRIVATE

## 2024-12-29 PROCEDURE — 25000003 PHARM REV CODE 250: Performed by: OBSTETRICS & GYNECOLOGY

## 2024-12-29 RX ORDER — GUAIFENESIN 100 MG/5ML
200 SOLUTION ORAL EVERY 8 HOURS PRN
Status: DISCONTINUED | OUTPATIENT
Start: 2024-12-29 | End: 2025-02-01 | Stop reason: HOSPADM

## 2024-12-29 RX ORDER — METHOCARBAMOL 500 MG/1
500 TABLET, FILM COATED ORAL 3 TIMES DAILY PRN
Status: DISCONTINUED | OUTPATIENT
Start: 2024-12-29 | End: 2025-02-01 | Stop reason: HOSPADM

## 2024-12-29 RX ADMIN — DOXYCYCLINE HYCLATE 100 MG: 100 TABLET, COATED ORAL at 08:12

## 2024-12-29 RX ADMIN — DOXYCYCLINE HYCLATE 100 MG: 100 TABLET, COATED ORAL at 09:12

## 2024-12-29 RX ADMIN — HYDROCORTISONE: 25 CREAM TOPICAL at 08:12

## 2024-12-29 RX ADMIN — THERA TABS 1 TABLET: TAB at 09:12

## 2024-12-29 RX ADMIN — Medication 6 MG: at 08:12

## 2024-12-29 RX ADMIN — POLYETHYLENE GLYCOL 3350 17 G: 17 POWDER, FOR SOLUTION ORAL at 09:12

## 2024-12-29 RX ADMIN — METHOCARBAMOL 500 MG: 500 TABLET ORAL at 01:12

## 2024-12-29 RX ADMIN — ASPIRIN 81 MG CHEWABLE TABLET 81 MG: 81 TABLET CHEWABLE at 09:12

## 2024-12-29 RX ADMIN — DONEPEZIL HYDROCHLORIDE 5 MG: 5 TABLET ORAL at 08:12

## 2024-12-29 RX ADMIN — APIXABAN 2.5 MG: 2.5 TABLET, FILM COATED ORAL at 09:12

## 2024-12-29 RX ADMIN — SENNOSIDES AND DOCUSATE SODIUM 1 TABLET: 50; 8.6 TABLET ORAL at 09:12

## 2024-12-29 RX ADMIN — HYDROCODONE BITARTRATE AND ACETAMINOPHEN 1 TABLET: 5; 325 TABLET ORAL at 09:12

## 2024-12-29 RX ADMIN — GUAIFENESIN 200 MG: 100 LIQUID ORAL at 08:12

## 2024-12-29 RX ADMIN — APIXABAN 2.5 MG: 2.5 TABLET, FILM COATED ORAL at 08:12

## 2024-12-29 RX ADMIN — MUPIROCIN 1 G: 20 OINTMENT TOPICAL at 08:12

## 2024-12-29 RX ADMIN — METHOCARBAMOL 500 MG: 500 TABLET ORAL at 08:12

## 2024-12-29 RX ADMIN — ATORVASTATIN CALCIUM 20 MG: 20 TABLET, FILM COATED ORAL at 09:12

## 2024-12-29 RX ADMIN — MUPIROCIN 1 G: 20 OINTMENT TOPICAL at 09:12

## 2024-12-29 RX ADMIN — HYDROCORTISONE: 25 CREAM TOPICAL at 09:12

## 2024-12-29 NOTE — PT/OT/SLP PROGRESS
"Occupational Therapy  Treatment    Vy Hussein   MRN: [id number]   Admitting Diagnosis: Muscular weakness    OT Date of Treatment: 12/29/24   OT Start Time: 0700  OT Stop Time: 0800  OT Total Time (min): 60 min    Billable Minutes:  60 mins    OT/PHILIP: PHILIP     Number of PHILIP visits since last OT visit: 1    General Precautions: Standard, fall  Orthopedic Precautions:    Braces:    Respiratory Status:          Subjective:  "I'm ok today and I rest well last night."    Pain/Comfort  Pain Rating 1: 0/10    Objective:        Functional Mobility:  Bed Mobility:       Transfers:        Functional Ambulation:     Activities of Daily Living:     Feeding adaptive equipment: None     UE adaptive equipment:      LE adaptive equipment:                     Bathing adaptive equipment:     Balance:   Static Sit: Good-  Dynamic Sit: Fair+  Static Stand:   Dynamic stand:     Therapeutic Activities and Exercises:  Patient performed 20 reps x5 sets of shoulder abd/add and shoulder flex/ext exercises using the yellow flex bar for increased BUE strength and endurance to assist her caregivers with her ADLs and bed mobility skills.    AM-PAC 6 CLICK ADL   How much help from another person does this patient currently need?   1 = Unable, Total/Dependent Assistance  2 = A lot, Maximum/Moderate Assistance  3 = A little, Minimum/Contact Guard/Supervision  4 = None, Modified [place]/Independent    Putting on and taking off regular lower body clothing? : 2  Bathing (including washing, rinsing, drying)?: 2  Toileting, which includes using toilet, bedpan, or urinal? : 2  Putting on and taking off regular upper body clothing?: 3  Taking care of personal grooming such as brushing teeth?: 3  Eating meals?: 4  Daily Activity Total Score: 16     AM-PAC Raw Score CMS "G-Code Modifier Level of Impairment Assistance   6 % Total / Unable   7 - 8 CM 80 - 100% Maximal Assist   9-13 CL 60 - 80% Moderate Assist   14 - 19 CK 40 - 60% Moderate " Assist   20 - 22 CJ 20 - 40% Minimal Assist   23 CI 1-20% SBA / CGA   24 CH 0% Independent/ Mod I       Patient left sitting up in the recliner chair at her bedside with the call light within reach.    ASSESSMENT:  Vy Hussein is a 84 y.o. female with a medical diagnosis of Muscular weakness and presents with .    Rehab identified problem list/impairments:  weakness, impaired endurance, impaired self care skills, impaired functional mobility, gait instability, impaired balance    Rehab potential is Good.    Activity tolerance: Good    Discharge recommendations: Moderate Intensity Therapy   Barriers to discharge:      Equipment recommendations: 3-in-1 commode, shower chair, walker, rolling    GOALS:   Multidisciplinary Problems       Occupational Therapy Goals          Problem: Occupational Therapy    Goal Priority Disciplines Outcome Interventions   Occupational Therapy Goal     OT, PT/OT Progressing    Description: STG:  Pt will perform grooming with setup  Pt will bathe with Mod I  Pt will perform UE dressing with I after set up  Pt will perform LE dressing with Mod I  Pt will sit EOB x 15 min with no assistance  Pt will transfer bed/chair/bsc with Mod I  Pt will perform standing task x 15 min with supervision assistance  Pt will tolerate 45 minutes of tx without fatigue      LT.Restore to max I with self care and mobility.                         Plan:  Patient to be seen 5 x/week to address the above listed problems via self-care/home management, therapeutic activities, therapeutic exercises  Plan of Care expires:    Plan of Care reviewed with: patient     Donyaharjinder AngelJORGITO xavier    2024

## 2024-12-29 NOTE — PLAN OF CARE
Problem: Adult Inpatient Plan of Care  Goal: Plan of Care Review  Outcome: Progressing  Goal: Patient-Specific Goal (Individualized)  Outcome: Progressing  Goal: Absence of Hospital-Acquired Illness or Injury  Outcome: Progressing     Problem: Skin Injury Risk Increased  Goal: Skin Health and Integrity  Outcome: Progressing     Problem: Fall Injury Risk  Goal: Absence of Fall and Fall-Related Injury  Outcome: Progressing  Intervention: Identify and Manage Contributors  Flowsheets (Taken 12/28/2024 2216)  Self-Care Promotion:   independence encouraged   BADL personal objects within reach   BADL personal routines maintained   adaptive equipment use encouraged  Medication Review/Management: medications reviewed  Intervention: Promote Injury-Free Environment  Flowsheets (Taken 12/28/2024 2216)  Safety Promotion/Fall Prevention:   assistive device/personal item within reach   bed alarm set   commode/urinal/bedpan at bedside   diversional activities provided   medications reviewed   muscle strengthening facilitated   nonskid shoes/socks when out of bed   instructed to call staff for mobility   lighting adjusted   side rails raised x 3

## 2024-12-29 NOTE — CARE UPDATE
Patient admitted for swing bed therapy, on 12/24/2024 after right hip fracture with femoral trochanteric nailing on 12/22/2024.  Patient has been doing well but has been having some muscle spasm and cramping in the right anterolateral thigh area.    Robaxin p.o. ordered PRN.

## 2024-12-30 LAB
ANION GAP SERPL CALCULATED.3IONS-SCNC: 10 MMOL/L (ref 7–16)
BASOPHILS # BLD AUTO: 0.07 K/UL (ref 0–0.2)
BASOPHILS NFR BLD AUTO: 1 % (ref 0–1)
BUN SERPL-MCNC: 14 MG/DL (ref 10–20)
BUN/CREAT SERPL: 20 (ref 6–20)
CALCIUM SERPL-MCNC: 8.9 MG/DL (ref 8.4–10.2)
CHLORIDE SERPL-SCNC: 105 MMOL/L (ref 98–107)
CO2 SERPL-SCNC: 28 MMOL/L (ref 23–31)
CREAT SERPL-MCNC: 0.71 MG/DL (ref 0.55–1.02)
DIFFERENTIAL METHOD BLD: ABNORMAL
EGFR (NO RACE VARIABLE) (RUSH/TITUS): 84 ML/MIN/1.73M2
EOSINOPHIL # BLD AUTO: 0.31 K/UL (ref 0–0.5)
EOSINOPHIL NFR BLD AUTO: 4.3 % (ref 1–4)
ERYTHROCYTE [DISTWIDTH] IN BLOOD BY AUTOMATED COUNT: 14.6 % (ref 11.5–14.5)
GLUCOSE SERPL-MCNC: 95 MG/DL (ref 82–115)
HCT VFR BLD AUTO: 29.9 % (ref 38–47)
HGB BLD-MCNC: 9.9 G/DL (ref 12–16)
IMM GRANULOCYTES # BLD AUTO: 0.04 K/UL (ref 0–0.04)
IMM GRANULOCYTES NFR BLD: 0.6 % (ref 0–0.4)
LYMPHOCYTES # BLD AUTO: 1.01 K/UL (ref 1–4.8)
LYMPHOCYTES NFR BLD AUTO: 14.1 % (ref 27–41)
MAGNESIUM SERPL-MCNC: 1.9 MG/DL (ref 1.6–2.6)
MCH RBC QN AUTO: 29.6 PG (ref 27–31)
MCHC RBC AUTO-ENTMCNC: 33.1 G/DL (ref 32–36)
MCV RBC AUTO: 89.3 FL (ref 80–96)
MONOCYTES # BLD AUTO: 0.58 K/UL (ref 0–0.8)
MONOCYTES NFR BLD AUTO: 8.1 % (ref 2–6)
MPC BLD CALC-MCNC: 9.5 FL (ref 9.4–12.4)
NEUTROPHILS # BLD AUTO: 5.13 K/UL (ref 1.8–7.7)
NEUTROPHILS NFR BLD AUTO: 71.9 % (ref 53–65)
NRBC # BLD AUTO: 0 X10E3/UL
NRBC, AUTO (.00): 0 %
PHOSPHATE SERPL-MCNC: 3.3 MG/DL (ref 2.3–4.7)
PLATELET # BLD AUTO: 275 K/UL (ref 150–400)
POTASSIUM SERPL-SCNC: 3.6 MMOL/L (ref 3.5–5.1)
RBC # BLD AUTO: 3.35 M/UL (ref 4.2–5.4)
SODIUM SERPL-SCNC: 139 MMOL/L (ref 136–145)
WBC # BLD AUTO: 7.14 K/UL (ref 4.5–11)

## 2024-12-30 PROCEDURE — 99308 SBSQ NF CARE LOW MDM 20: CPT | Mod: ,,, | Performed by: FAMILY MEDICINE

## 2024-12-30 PROCEDURE — 36415 COLL VENOUS BLD VENIPUNCTURE: CPT | Performed by: OBSTETRICS & GYNECOLOGY

## 2024-12-30 PROCEDURE — 80048 BASIC METABOLIC PNL TOTAL CA: CPT | Performed by: OBSTETRICS & GYNECOLOGY

## 2024-12-30 PROCEDURE — 97110 THERAPEUTIC EXERCISES: CPT

## 2024-12-30 PROCEDURE — 94761 N-INVAS EAR/PLS OXIMETRY MLT: CPT

## 2024-12-30 PROCEDURE — 11000004 HC SNF PRIVATE

## 2024-12-30 PROCEDURE — 85025 COMPLETE CBC W/AUTO DIFF WBC: CPT | Performed by: OBSTETRICS & GYNECOLOGY

## 2024-12-30 PROCEDURE — 83735 ASSAY OF MAGNESIUM: CPT | Performed by: OBSTETRICS & GYNECOLOGY

## 2024-12-30 PROCEDURE — 25000003 PHARM REV CODE 250: Performed by: OBSTETRICS & GYNECOLOGY

## 2024-12-30 PROCEDURE — 25000003 PHARM REV CODE 250: Performed by: EMERGENCY MEDICINE

## 2024-12-30 PROCEDURE — 97110 THERAPEUTIC EXERCISES: CPT | Mod: CQ

## 2024-12-30 PROCEDURE — 97116 GAIT TRAINING THERAPY: CPT | Mod: CQ

## 2024-12-30 PROCEDURE — 84100 ASSAY OF PHOSPHORUS: CPT | Performed by: OBSTETRICS & GYNECOLOGY

## 2024-12-30 RX ADMIN — THERA TABS 1 TABLET: TAB at 09:12

## 2024-12-30 RX ADMIN — DONEPEZIL HYDROCHLORIDE 5 MG: 5 TABLET ORAL at 08:12

## 2024-12-30 RX ADMIN — APIXABAN 2.5 MG: 2.5 TABLET, FILM COATED ORAL at 09:12

## 2024-12-30 RX ADMIN — HYDROCORTISONE: 25 CREAM TOPICAL at 08:12

## 2024-12-30 RX ADMIN — DOXYCYCLINE HYCLATE 100 MG: 100 TABLET, COATED ORAL at 09:12

## 2024-12-30 RX ADMIN — HYDROCODONE BITARTRATE AND ACETAMINOPHEN 1 TABLET: 5; 325 TABLET ORAL at 07:12

## 2024-12-30 RX ADMIN — DOXYCYCLINE HYCLATE 100 MG: 100 TABLET, COATED ORAL at 08:12

## 2024-12-30 RX ADMIN — HYDROCODONE BITARTRATE AND ACETAMINOPHEN 1 TABLET: 5; 325 TABLET ORAL at 03:12

## 2024-12-30 RX ADMIN — ASPIRIN 81 MG CHEWABLE TABLET 81 MG: 81 TABLET CHEWABLE at 09:12

## 2024-12-30 RX ADMIN — SENNOSIDES AND DOCUSATE SODIUM 1 TABLET: 50; 8.6 TABLET ORAL at 08:12

## 2024-12-30 RX ADMIN — POLYETHYLENE GLYCOL 3350 17 G: 17 POWDER, FOR SOLUTION ORAL at 09:12

## 2024-12-30 RX ADMIN — APIXABAN 2.5 MG: 2.5 TABLET, FILM COATED ORAL at 08:12

## 2024-12-30 RX ADMIN — HYDROCORTISONE: 25 CREAM TOPICAL at 09:12

## 2024-12-30 RX ADMIN — SENNOSIDES AND DOCUSATE SODIUM 1 TABLET: 50; 8.6 TABLET ORAL at 09:12

## 2024-12-30 RX ADMIN — ATORVASTATIN CALCIUM 20 MG: 20 TABLET, FILM COATED ORAL at 09:12

## 2024-12-30 NOTE — PROGRESS NOTES
[facility]  Medical Surgical Unit  Hospital Medicine  Progress Note    Patient Name: Vy Hussein  MRN: [id number]  Patient Class: IP- Swing   Admission Date: 12/24/2024  Length of Stay: 6 days  Attending Physician: Elizabeth Castro MD  Primary Care Provider: Flora Brennan MD        Subjective     Principal Problem:Muscular weakness        HPI:  No notes on file    Overview/Hospital Course:  12/27/24 - up in chair. No complaints voiced.    12/30/24 - doing well today. No complaints voiced.    Interval History: will continue present treatment.    Review of Systems  Objective:     Vital Signs (Most Recent):  Temp: 97.5 °F (36.4 °C) (12/30/24 0834)  Pulse: 88 (12/30/24 0834)  Resp: 18 (12/30/24 0834)  BP: (!) 94/56 (12/30/24 0834)  SpO2: 98 % (12/30/24 0834) Vital Signs (24h Range):  Temp:  [97.5 °F (36.4 °C)-97.9 °F (36.6 °C)] 97.5 °F (36.4 °C)  Pulse:  [88-94] 88  Resp:  [16-18] 18  SpO2:  [96 %-98 %] 98 %  BP: ()/(56-74) 94/56     Weight: 43 kg (94 lb 12.8 oz)  Body mass index is 15.3 kg/m².    Intake/Output Summary (Last 24 hours) at 12/30/2024 0853  Last data filed at 12/29/2024 1806  Gross per 24 hour   Intake 480 ml   Output --   Net 480 ml         Physical Exam        Significant Labs: All pertinent labs within the past 24 hours have been reviewed.    Significant Imaging: I have reviewed all pertinent imaging results/findings within the past 24 hours.    Assessment and Plan     No notes have been filed under this hospital service.  Service: Hospital Medicine    VTE Risk Mitigation (From admission, onward)           Ordered     apixaban tablet 2.5 mg  2 times daily         12/25/24 1056     IP VTE HIGH RISK PATIENT  Once         12/24/24 1544     Place COLT hose  Until discontinued         12/24/24 1544                    Discharge Planning   REESE: 1/13/2025     Code Status: Full Code   Medical Readiness for Discharge Date:   Discharge Plan A: Home, Home Health                Please  place Justification for DME        Ema Chamorro MD  Department of Hospital Medicine   [facility] - Medical Surgical Unit

## 2024-12-30 NOTE — SUBJECTIVE & OBJECTIVE
Interval History: will continue present treatment.    Review of Systems  Objective:     Vital Signs (Most Recent):  Temp: 97.5 °F (36.4 °C) (12/30/24 0834)  Pulse: 88 (12/30/24 0834)  Resp: 18 (12/30/24 0834)  BP: (!) 94/56 (12/30/24 0834)  SpO2: 98 % (12/30/24 0834) Vital Signs (24h Range):  Temp:  [97.5 °F (36.4 °C)-97.9 °F (36.6 °C)] 97.5 °F (36.4 °C)  Pulse:  [88-94] 88  Resp:  [16-18] 18  SpO2:  [96 %-98 %] 98 %  BP: ()/(56-74) 94/56     Weight: 43 kg (94 lb 12.8 oz)  Body mass index is 15.3 kg/m².    Intake/Output Summary (Last 24 hours) at 12/30/2024 0853  Last data filed at 12/29/2024 1806  Gross per 24 hour   Intake 480 ml   Output --   Net 480 ml         Physical Exam        Significant Labs: All pertinent labs within the past 24 hours have been reviewed.    Significant Imaging: I have reviewed all pertinent imaging results/findings within the past 24 hours.

## 2024-12-30 NOTE — PT/OT/SLP PROGRESS
Occupational Therapy   Treatment    Name: Vy Hussein  MRN: [id number]  Admitting Diagnosis:  Muscular weakness       Recommendations:     Discharge Recommendations: Moderate Intensity Therapy  Discharge Equipment Recommendations:  3-in-1 commode, shower chair, walker, rolling  Barriers to discharge:       Assessment:     Vy Hussein is a 84 y.o. female with a medical diagnosis of Muscular weakness. Performance deficits affecting function are weakness, impaired endurance, impaired self care skills, impaired functional mobility, impaired balance, decreased lower extremity function, decreased safety awareness.     Rehab Prognosis:  Good; patient would benefit from acute skilled OT services to address these deficits and reach maximum level of function.       Plan:     Patient to be seen 5 x/week to address the above listed problems via therapeutic exercises, therapeutic activities, self-care/home management  Plan of Care Expires:    Plan of Care Reviewed with: patient    Subjective     Chief Complaint: Decreased mobility  Patient/Family Comments/goals: increased strength  Pain/Comfort:  Pain Rating 1: 0/10    Objective:     Communicated with: RN prior to session.  Patient found up in chair with   upon OT entry to room.    General Precautions: Standard, fall    Orthopedic Precautions:   Braces:    Respiratory Status: Room air     Occupational Performance:       Functional Mobility/Transfers:  Patient completed Sit <> Stand Transfer with minimum assistance  with  rolling walker   Functional Mobility: min a with ROLLING WALKER and extra time needed    Activities of Daily Living:  Feeding S/u   Grooming S/u   Bathing Min a   UB dsg Min a   LB dsg Mod a   Toileting Min a   Toilet t/f Min a       Patient t/f to bathroom with ROLLING WALKER from across the room and then completed toileting with min a    AMPAC 6 Click ADL: 16    Treatment & Education:  Pt educated on OT role/POC.   Importance of OOB activity with  staff assistance.  Importance of sitting up in the chair throughout the day as tolerated, especially for meals   Safety during functional t/f and mobility  Importance of assisting with self-care activities       Patient left up in chair with  krishna PTA notified    GOALS:   Multidisciplinary Problems       Occupational Therapy Goals          Problem: Occupational Therapy    Goal Priority Disciplines Outcome Interventions   Occupational Therapy Goal     OT, PT/OT Progressing    Description: STG:  Pt will perform grooming with setup  Pt will bathe with Mod I  Pt will perform UE dressing with I after set up  Pt will perform LE dressing with Mod I  Pt will sit EOB x 15 min with no assistance  Pt will transfer bed/chair/bsc with Mod I  Pt will perform standing task x 15 min with supervision assistance  Pt will tolerate 45 minutes of tx without fatigue      LT.Restore to max I with self care and mobility.                         Time Tracking:     OT Date of Treatment: 24  OT Start Time: 1305  OT Stop Time: 1341  OT Total Time (min): 36 min    Billable Minutes:Self Care/Home Management 15 min  Therapeutic Exercise 21 min  Lizeth Negron OTR/L            Number of PHILIP visits since last OT visit: 1    2024

## 2024-12-30 NOTE — PLAN OF CARE
Problem: Adult Inpatient Plan of Care  Goal: Plan of Care Review  Outcome: Not Progressing  Goal: Patient-Specific Goal (Individualized)  Outcome: Not Progressing  Goal: Absence of Hospital-Acquired Illness or Injury  Outcome: Not Progressing  Goal: Optimal Comfort and Wellbeing  Outcome: Not Progressing  Goal: Readiness for Transition of Care  Outcome: Not Progressing     Problem: Skin Injury Risk Increased  Goal: Skin Health and Integrity  Outcome: Not Progressing     Problem: Fall Injury Risk  Goal: Absence of Fall and Fall-Related Injury  Outcome: Not Progressing     Problem: Infection  Goal: Absence of Infection Signs and Symptoms  Outcome: Not Progressing     Problem: Wound  Goal: Optimal Coping  Outcome: Not Progressing  Goal: Optimal Functional Ability  Outcome: Not Progressing  Goal: Absence of Infection Signs and Symptoms  Outcome: Not Progressing  Goal: Improved Oral Intake  Outcome: Not Progressing  Goal: Optimal Pain Control and Function  Outcome: Not Progressing  Goal: Skin Health and Integrity  Outcome: Not Progressing  Goal: Optimal Wound Healing  Outcome: Not Progressing     Problem: Communication Impairment  Goal: Effective Communication Skills  Outcome: Not Progressing

## 2024-12-30 NOTE — PLAN OF CARE
Problem: Adult Inpatient Plan of Care  Goal: Plan of Care Review  Outcome: Progressing  Goal: Patient-Specific Goal (Individualized)  Outcome: Progressing     Problem: Skin Injury Risk Increased  Goal: Skin Health and Integrity  Outcome: Progressing     Problem: Fall Injury Risk  Goal: Absence of Fall and Fall-Related Injury  Outcome: Progressing  Intervention: Identify and Manage Contributors  Flowsheets (Taken 12/29/2024 2146)  Self-Care Promotion:   independence encouraged   BADL personal objects within reach   BADL personal routines maintained   adaptive equipment use encouraged  Medication Review/Management: medications reviewed  Intervention: Promote Injury-Free Environment  Flowsheets (Taken 12/29/2024 2146)  Safety Promotion/Fall Prevention:   assistive device/personal item within reach   bed alarm set   commode/urinal/bedpan at bedside   diversional activities provided   instructed to call staff for mobility   lighting adjusted   medications reviewed   muscle strengthening facilitated   nonskid shoes/socks when out of bed   side rails raised x 3

## 2024-12-30 NOTE — PT/OT/SLP PROGRESS
Physical Therapy Treatment    Patient Name:  Vy Hussein   MRN:  [id number]    Recommendations:     Discharge Recommendations: Moderate Intensity Therapy  Discharge Equipment Recommendations: 3-in-1 commode, walker, rolling, shower chair  Barriers to discharge: Decreased caregiver support    Assessment:     Vy Hussein is a 84 y.o. female admitted with a medical diagnosis of Muscular weakness.  She presents with the following impairments/functional limitations: weakness, impaired endurance, impaired self care skills, impaired functional mobility, gait instability, impaired balance, decreased upper extremity function, decreased lower extremity function, pain, orthopedic precautions .  Patient is able to progress through completion of Therapeutic exercises without reports of increased pain. No adverse effects noted to PT treatment session.     Rehab Prognosis: Good and Fair; patient would benefit from acute skilled PT services to address these deficits and reach maximum level of function.    Recent Surgery: * No surgery found *      Plan:     During this hospitalization, patient to be seen 5 x/week to address the identified rehab impairments via gait training, therapeutic exercises, therapeutic activities and progress toward the following goals:    Plan of Care Expires:  01/14/25    Subjective     Chief Complaint: Decreased strength and mobility   Patient/Family Comments/goals: Increase independence to return home   Pain/Comfort:  Pain Rating 1: 0/10      Objective:     Communicated with skilled nursing and supervising physical therapist, Zbigniew Corado DPT  prior to session.  Patient found HOB elevated with bed alarm upon PT entry to room.     General Precautions: Standard, fall  Orthopedic Precautions: RLE weight bearing as tolerated  Braces: N/A  Respiratory Status: Room air     Functional Mobility:  Bed Mobility:     Rolling Right: minimum assistance  Scooting: moderate assistance  Supine to Sit: moderate  "assistance  Transfers:     Sit to Stand:  moderate assistance with rolling walker  Gait: Approx. 50'  with RW and mod assist x 1 on level indoor surface with verbal and visual cues for proper step length and walker placement.        AM-PAC 6 CLICK MOBILITY  Turning over in bed (including adjusting bedclothes, sheets and blankets)?: 2  Sitting down on and standing up from a chair with arms (e.g., wheelchair, bedside commode, etc.): 2  Moving from lying on back to sitting on the side of the bed?: 2  Moving to and from a bed to a chair (including a wheelchair)?: 2  Need to walk in hospital room?: 2  Climbing 3-5 steps with a railing?: 1  Basic Mobility Total Score: 11       Treatment & Education:  Ther-Ex Reps       Ankle pumps 30 x    Quad sets 30 x 3"   Horizontal Hip Abduction/Hip ADD 2 x 10   Hip ADD 30 x 3" *    Heelslides    LAQ's 2 x 10   Hamstring curls    Glut sets  30 x 3"    Hip ABD                     Patient left up in chair with call button in reach..    GOALS:   Multidisciplinary Problems       Physical Therapy Goals          Problem: Physical Therapy    Goal Priority Disciplines Outcome Interventions   Physical Therapy Goal     PT, PT/OT     Description: Short term goals  1. Pt will require min assist for bed mobility  2. Pt will require min assist for sit to stand transfer   3. Pt will ambulate 25 feet with RW and CGA    Long term goals  1. Pt will be independent with all bed mobility  2. Pt will be independent/mod independent with sit to stand transfer  3. Pt will perform 5 sit to stands with only 1 UE for assistance  4. Pt will ambulate 50 feet with RW and CGA  5. Pt will be independent with ascending and descending 2 standard height steps with handrail                       Time Tracking:     PT Received On: 12/30/24  PT Start Time: 1342     PT Stop Time: 1418  PT Total Time (min): 36 min     Billable Minutes: Gait Training 10, Therapeutic Activity 0, and Therapeutic Exercise 26    Treatment Type: " Treatment  PT/PTA: PTA     Number of PTA visits since last PT visit: 2   Continue Plan of Care per PT order to progress patient toward rehab goals as tolerated by patient.  PERLA Rosenthal   12/30/2024

## 2024-12-30 NOTE — CARE UPDATE
Patient requesting cough medicine for cough, Robitussin ordered.  No fever, no shortness of breath.

## 2024-12-31 PROCEDURE — 11000004 HC SNF PRIVATE

## 2024-12-31 PROCEDURE — 94761 N-INVAS EAR/PLS OXIMETRY MLT: CPT

## 2024-12-31 PROCEDURE — 97110 THERAPEUTIC EXERCISES: CPT

## 2024-12-31 PROCEDURE — 25000003 PHARM REV CODE 250: Performed by: EMERGENCY MEDICINE

## 2024-12-31 PROCEDURE — 25000003 PHARM REV CODE 250: Performed by: OBSTETRICS & GYNECOLOGY

## 2024-12-31 PROCEDURE — 97530 THERAPEUTIC ACTIVITIES: CPT | Mod: CQ

## 2024-12-31 PROCEDURE — 97110 THERAPEUTIC EXERCISES: CPT | Mod: CQ

## 2024-12-31 RX ADMIN — APIXABAN 2.5 MG: 2.5 TABLET, FILM COATED ORAL at 08:12

## 2024-12-31 RX ADMIN — SENNOSIDES AND DOCUSATE SODIUM 1 TABLET: 50; 8.6 TABLET ORAL at 08:12

## 2024-12-31 RX ADMIN — ASPIRIN 81 MG CHEWABLE TABLET 81 MG: 81 TABLET CHEWABLE at 09:12

## 2024-12-31 RX ADMIN — HYDROCODONE BITARTRATE AND ACETAMINOPHEN 1 TABLET: 5; 325 TABLET ORAL at 01:12

## 2024-12-31 RX ADMIN — GUAIFENESIN 200 MG: 100 LIQUID ORAL at 09:12

## 2024-12-31 RX ADMIN — HYDROCORTISONE: 25 CREAM TOPICAL at 08:12

## 2024-12-31 RX ADMIN — DOXYCYCLINE HYCLATE 100 MG: 100 TABLET, COATED ORAL at 08:12

## 2024-12-31 RX ADMIN — ATORVASTATIN CALCIUM 20 MG: 20 TABLET, FILM COATED ORAL at 09:12

## 2024-12-31 RX ADMIN — THERA TABS 1 TABLET: TAB at 09:12

## 2024-12-31 RX ADMIN — SENNOSIDES AND DOCUSATE SODIUM 1 TABLET: 50; 8.6 TABLET ORAL at 09:12

## 2024-12-31 RX ADMIN — POLYETHYLENE GLYCOL 3350 17 G: 17 POWDER, FOR SOLUTION ORAL at 09:12

## 2024-12-31 RX ADMIN — METHOCARBAMOL 500 MG: 500 TABLET ORAL at 09:12

## 2024-12-31 RX ADMIN — DONEPEZIL HYDROCHLORIDE 5 MG: 5 TABLET ORAL at 08:12

## 2024-12-31 RX ADMIN — HYDROCORTISONE: 25 CREAM TOPICAL at 09:12

## 2024-12-31 RX ADMIN — APIXABAN 2.5 MG: 2.5 TABLET, FILM COATED ORAL at 09:12

## 2024-12-31 RX ADMIN — DOXYCYCLINE HYCLATE 100 MG: 100 TABLET, COATED ORAL at 09:12

## 2024-12-31 NOTE — NURSING
ELIZABETH Ibrahim RN and ELIZABETH Collins CNA to room after patient stated that she was wet. Ambulated patient to restroom. Patient voided and was visibly upset and crying. Nurse asked patient what was wrong and patient states she does not know. Asked patient if she was ready for a bath and patient stated she wanted to go back to bed at this time because she did not rest well. Staff assisted patient back to bed and nurse stayed to talk with patient. Patient stated that she was upset because she isn't feeling the urge to void and feels that her bedside commode is too high up for her to reach the ground with her feet and that its causing her not to empty her bladder. Nurse offered to lower BSC to see if that would help the next time patient got up. Patient was very happy with offer. BSC lowered one notch to see if that would help patient. Nurse will check with patient later to offer help with AM care.

## 2024-12-31 NOTE — NURSING
"Nurse back to room to assist patient with A.M. care. Patient states "I don't feel like it today. I don't want to get up". Educated on importance of hygiene care and patient voices understanding.   "

## 2024-12-31 NOTE — PT/OT/SLP PROGRESS
Physical Therapy Treatment    Patient Name:  Vy Hussein   MRN:  [id number]    Recommendations:     Discharge Recommendations: Moderate Intensity Therapy  Discharge Equipment Recommendations: 3-in-1 commode, walker, rolling, shower chair  Barriers to discharge: None    Assessment:     Vy Hussein is a 84 y.o. female admitted with a medical diagnosis of Muscular weakness.  She presents with the following impairments/functional limitations: weakness .  Pt pleasant and participates well, though slow and and somewhat guarded.    Rehab Prognosis: Good; patient would benefit from acute skilled PT services to address these deficits and reach maximum level of function.    Recent Surgery: * No surgery found *      Plan:     During this hospitalization, patient to be seen 5 x/week to address the identified rehab impairments via gait training, therapeutic exercises, therapeutic activities and progress toward the following goals:    Plan of Care Expires:  01/14/25    Received Plan of Care per Florinda San PT     Subjective     Chief Complaint: none  Patient/Family Comments/goals: agrees to PT Tx; nephew and great great nephew present  Pain/Comfort:  Pain Rating 1: 0/10      Objective:     Communicated with patient prior to session.  Patient found  standing at bedside  with walker and family present upon PT entry to room.     General Precautions: Standard, fall  Orthopedic Precautions: RLE weight bearing as tolerated  Braces: N/A  Respiratory Status: Room air     Functional Mobility:  Transfers:     Sit to Stand:  modified independence and supervision with rolling walker  Bed to Chair: modified independence and supervision with  rolling walker  using  Step Transfer  Gait: 50ft in room with rolling walker, SBA-SVN; slow, guarded      AM-PAC 6 CLICK MOBILITY          Treatment & Education:  TA: ambulation in room as noted above for strength, endurance; sit to stand transfers x 10 repetitions   Sitting BLE exercises x  10-15 repetitions each: marching, long arc quads, hip abduction, ankle rocking  Standing at walker: marching, mini squats, heel/toe raises, hip abduction x 10 repetitions each    Patient left up in chair with call button in reach..    GOALS:   Multidisciplinary Problems       Physical Therapy Goals          Problem: Physical Therapy    Goal Priority Disciplines Outcome Interventions   Physical Therapy Goal     PT, PT/OT     Description: Short term goals  1. Pt will require min assist for bed mobility  2. Pt will require min assist for sit to stand transfer   3. Pt will ambulate 25 feet with RW and CGA    Long term goals  1. Pt will be independent with all bed mobility  2. Pt will be independent/mod independent with sit to stand transfer  3. Pt will perform 5 sit to stands with only 1 UE for assistance  4. Pt will ambulate 50 feet with RW and CGA  5. Pt will be independent with ascending and descending 2 standard height steps with handrail                       Time Tracking:     PT Received On: 12/31/24  PT Start Time: 1545     PT Stop Time: 1615  PT Total Time (min): 30 min     Billable Minutes: Therapeutic Activity 15 and Therapeutic Exercise 15    Treatment Type: Treatment  PT/PTA: PTA     Number of PTA visits since last PT visit: 3     12/31/2024

## 2024-12-31 NOTE — PT/OT/SLP PROGRESS
Occupational Therapy   Treatment    Name: Vy Hussein  MRN: [id number]  Admitting Diagnosis:  Muscular weakness       Recommendations:     Discharge Recommendations: Moderate Intensity Therapy  Discharge Equipment Recommendations:  3-in-1 commode, walker, rolling  Barriers to discharge:       Assessment:     Vy Hussein is a 84 y.o. female with a medical diagnosis of Muscular weakness. Performance deficits affecting function are impaired endurance, weakness, impaired self care skills, impaired functional mobility, impaired balance, decreased lower extremity function, decreased safety awareness.     Rehab Prognosis:  Good; patient would benefit from acute skilled OT services to address these deficits and reach maximum level of function.       Plan:     Patient to be seen 5 x/week to address the above listed problems via therapeutic exercises, therapeutic activities, self-care/home management  Plan of Care Expires:    Plan of Care Reviewed with: patient    Subjective     Chief Complaint: Decreased mobility  Patient/Family Comments/goals: increased strength  Pain/Comfort:  Pain Rating 1: 0/10    Objective:     Communicated with: RN prior to session.  Patient found up in chair with   upon OT entry to room.    General Precautions: Standard, fall    Orthopedic Precautions:   Braces:    Respiratory Status: Room air     Occupational Performance:       Functional Mobility/Transfers:  Patient completed Sit <> Stand Transfer with minimum assistance  with  rolling walker   Functional Mobility: min a with ROLLING WALKER and extra time needed    Activities of Daily Living:  Feeding S/u   Grooming S/u   Bathing Min a   UB dsg Min a   LB dsg Mod a   Toileting Min a   Toilet t/f Min a           AMPAC 6 Click ADL: 16    Treatment & Education:  Pt educated on OT role/POC.   Importance of OOB activity with staff assistance.  Importance of sitting up in the chair throughout the day as tolerated, especially for meals   Safety  during functional t/f and mobility  Importance of assisting with self-care activities     Patient completed the following for increased strength to increase I with ADLs: 2# dowel- shoulder press, chest press, bicep curls x 40, yellow theraflex x 40 both ways, yellow theraband- scapular retraction x 40      Patient left up in chair with call button in reach and chair alarm on    GOALS:   Multidisciplinary Problems       Occupational Therapy Goals          Problem: Occupational Therapy    Goal Priority Disciplines Outcome Interventions   Occupational Therapy Goal     OT, PT/OT Progressing    Description: STG:  Pt will perform grooming with setup  Pt will bathe with Mod I  Pt will perform UE dressing with I after set up  Pt will perform LE dressing with Mod I  Pt will sit EOB x 15 min with no assistance  Pt will transfer bed/chair/bsc with Mod I  Pt will perform standing task x 15 min with supervision assistance  Pt will tolerate 45 minutes of tx without fatigue      LT.Restore to max I with self care and mobility.                         Time Tracking:     OT Date of Treatment: 24  OT Start Time: 0947  OT Stop Time: 1024  OT Total Time (min): 37 min    Billable Minutes:Therapeutic Exercise 37 min  Lizeth Negron, OTR/L            Number of PHILIP visits since last OT visit: 1    2024

## 2024-12-31 NOTE — PLAN OF CARE
Problem: Adult Inpatient Plan of Care  Goal: Plan of Care Review  Outcome: Progressing  Flowsheets (Taken 12/30/2024 2152)  Plan of Care Reviewed With: patient  Goal: Patient-Specific Goal (Individualized)  Outcome: Progressing  Goal: Absence of Hospital-Acquired Illness or Injury  Outcome: Progressing  Intervention: Prevent Infection  Flowsheets (Taken 12/30/2024 2152)  Infection Prevention:   environmental surveillance performed   equipment surfaces disinfected   hand hygiene promoted   single patient room provided   rest/sleep promoted   personal protective equipment utilized  Goal: Optimal Comfort and Wellbeing  Outcome: Progressing  Intervention: Provide Person-Centered Care  Flowsheets (Taken 12/30/2024 2152)  Trust Relationship/Rapport:   care explained   questions encouraged   choices provided   reassurance provided   emotional support provided   thoughts/feelings acknowledged   questions answered   empathic listening provided  Goal: Readiness for Transition of Care  Outcome: Progressing

## 2025-01-01 PROCEDURE — 25000003 PHARM REV CODE 250: Performed by: OBSTETRICS & GYNECOLOGY

## 2025-01-01 PROCEDURE — 94761 N-INVAS EAR/PLS OXIMETRY MLT: CPT

## 2025-01-01 PROCEDURE — 92522 EVALUATE SPEECH PRODUCTION: CPT

## 2025-01-01 PROCEDURE — 92507 TX SP LANG VOICE COMM INDIV: CPT

## 2025-01-01 PROCEDURE — 11000004 HC SNF PRIVATE

## 2025-01-01 PROCEDURE — 25000003 PHARM REV CODE 250: Performed by: EMERGENCY MEDICINE

## 2025-01-01 RX ADMIN — APIXABAN 2.5 MG: 2.5 TABLET, FILM COATED ORAL at 08:01

## 2025-01-01 RX ADMIN — ATORVASTATIN CALCIUM 20 MG: 20 TABLET, FILM COATED ORAL at 08:01

## 2025-01-01 RX ADMIN — GUAIFENESIN 200 MG: 100 LIQUID ORAL at 10:01

## 2025-01-01 RX ADMIN — DOXYCYCLINE HYCLATE 100 MG: 100 TABLET, COATED ORAL at 08:01

## 2025-01-01 RX ADMIN — ASPIRIN 81 MG CHEWABLE TABLET 81 MG: 81 TABLET CHEWABLE at 08:01

## 2025-01-01 RX ADMIN — DONEPEZIL HYDROCHLORIDE 5 MG: 5 TABLET ORAL at 08:01

## 2025-01-01 RX ADMIN — HYDROCORTISONE: 25 CREAM TOPICAL at 08:01

## 2025-01-01 RX ADMIN — SENNOSIDES AND DOCUSATE SODIUM 1 TABLET: 50; 8.6 TABLET ORAL at 08:01

## 2025-01-01 RX ADMIN — THERA TABS 1 TABLET: TAB at 08:01

## 2025-01-01 RX ADMIN — HYDROCODONE BITARTRATE AND ACETAMINOPHEN 1 TABLET: 5; 325 TABLET ORAL at 08:01

## 2025-01-01 NOTE — PLAN OF CARE
Problem: Adult Inpatient Plan of Care  Goal: Plan of Care Review  Outcome: Progressing     Problem: Adult Inpatient Plan of Care  Goal: Patient-Specific Goal (Individualized)  Outcome: Progressing     Problem: Adult Inpatient Plan of Care  Goal: Absence of Hospital-Acquired Illness or Injury  Outcome: Progressing     Problem: Adult Inpatient Plan of Care  Goal: Optimal Comfort and Wellbeing  Outcome: Progressing     Problem: Adult Inpatient Plan of Care  Goal: Readiness for Transition of Care  Outcome: Progressing     Problem: Skin Injury Risk Increased  Goal: Skin Health and Integrity  Outcome: Progressing     Problem: Fall Injury Risk  Goal: Absence of Fall and Fall-Related Injury  Outcome: Progressing     Problem: Infection  Goal: Absence of Infection Signs and Symptoms  Outcome: Progressing     Problem: Wound  Goal: Optimal Coping  Outcome: Progressing     Problem: Wound  Goal: Optimal Functional Ability  Outcome: Progressing     Problem: Wound  Goal: Absence of Infection Signs and Symptoms  Outcome: Progressing     Problem: Wound  Goal: Improved Oral Intake  Outcome: Progressing     Problem: Wound  Goal: Optimal Pain Control and Function  Outcome: Progressing     Problem: Wound  Goal: Skin Health and Integrity  Outcome: Progressing     Problem: Wound  Goal: Optimal Wound Healing  Outcome: Progressing     Problem: Communication Impairment  Goal: Effective Communication Skills  Outcome: Progressing     Problem: Pain Acute  Goal: Optimal Pain Control and Function  Reactivated

## 2025-01-01 NOTE — PLAN OF CARE
Problem: Adult Inpatient Plan of Care  Goal: Patient-Specific Goal (Individualized)  Outcome: Progressing  Goal: Optimal Comfort and Wellbeing  Outcome: Progressing  Intervention: Monitor Pain and Promote Comfort  Flowsheets (Taken 1/1/2025 1409)  Pain Management Interventions:   care clustered   pillow support provided   position adjusted  Intervention: Provide Person-Centered Care  Flowsheets (Taken 1/1/2025 1409)  Trust Relationship/Rapport:   care explained   choices provided   emotional support provided   empathic listening provided   questions answered   questions encouraged   reassurance provided   thoughts/feelings acknowledged     Problem: Fall Injury Risk  Goal: Absence of Fall and Fall-Related Injury  Outcome: Progressing  Intervention: Identify and Manage Contributors  Flowsheets (Taken 1/1/2025 1409)  Self-Care Promotion:   independence encouraged   BADL personal objects within reach   BADL personal routines maintained   meal set-up provided   adaptive equipment use encouraged  Medication Review/Management:   medications reviewed   high-risk medications identified  Intervention: Promote Injury-Free Environment  Flowsheets (Taken 1/1/2025 1409)  Safety Promotion/Fall Prevention:   assistive device/personal item within reach   chair alarm set   high risk medications identified   Fall Risk reviewed with patient/family   in recliner, wheels locked   instructed to call staff for mobility   nonskid shoes/socks when out of bed     Problem: Pain Acute  Goal: Optimal Pain Control and Function  Outcome: Progressing  Intervention: Develop Pain Management Plan  Flowsheets (Taken 1/1/2025 1409)  Pain Management Interventions:   care clustered   pillow support provided   position adjusted  Intervention: Prevent or Manage Pain  Flowsheets (Taken 1/1/2025 1409)  Sensory Stimulation Regulation:   care clustered   quiet environment promoted  Bowel Elimination Promotion:   adequate fluid intake promoted   ambulation  promoted  Medication Review/Management:   medications reviewed   high-risk medications identified  Intervention: Optimize Psychosocial Wellbeing  Flowsheets (Taken 1/1/2025 1109)  Supportive Measures:   active listening utilized   counseling provided   decision-making supported   self-reflection promoted   self-care encouraged   verbalization of feelings encouraged   positive reinforcement provided  Diversional Activities: reading

## 2025-01-01 NOTE — PLAN OF CARE
Problem: Adult Inpatient Plan of Care  Goal: Plan of Care Review  Outcome: Progressing  Goal: Patient-Specific Goal (Individualized)  Outcome: Progressing  Goal: Absence of Hospital-Acquired Illness or Injury  Outcome: Progressing  Goal: Optimal Comfort and Wellbeing  Outcome: Progressing     Problem: Skin Injury Risk Increased  Goal: Skin Health and Integrity  Outcome: Progressing     Problem: Wound  Goal: Skin Health and Integrity  Outcome: Progressing

## 2025-01-01 NOTE — PLAN OF CARE
Problem: Adult Inpatient Plan of Care  Goal: Plan of Care Review  Outcome: Progressing  Goal: Patient-Specific Goal (Individualized)  Outcome: Progressing  Goal: Absence of Hospital-Acquired Illness or Injury  Outcome: Progressing  Goal: Optimal Comfort and Wellbeing  Outcome: Progressing  Goal: Readiness for Transition of Care  Outcome: Progressing     Problem: Skin Injury Risk Increased  Goal: Skin Health and Integrity  Outcome: Progressing     Problem: Fall Injury Risk  Goal: Absence of Fall and Fall-Related Injury  Outcome: Progressing     Problem: Infection  Goal: Absence of Infection Signs and Symptoms  Outcome: Progressing     Problem: Wound  Goal: Optimal Coping  Outcome: Progressing  Goal: Optimal Functional Ability  Outcome: Progressing  Goal: Absence of Infection Signs and Symptoms  Outcome: Progressing  Goal: Improved Oral Intake  Outcome: Progressing  Goal: Optimal Pain Control and Function  Outcome: Progressing  Goal: Skin Health and Integrity  Outcome: Progressing  Goal: Optimal Wound Healing  Outcome: Progressing     Problem: Communication Impairment  Goal: Effective Communication Skills  Outcome: Progressing

## 2025-01-01 NOTE — PT/OT/SLP PROGRESS
"Speech Language Pathology Treatment    Patient Name:  Vy Hussein   MRN:  [id number]  Admitting Diagnosis: Muscular weakness    Recommendations:                 General Recommendations:  Speech/language therapy  Diet recommendations:   ,     Aspiration Precautions:       General Precautions: Standard,    Communication strategies:  provide increased time to answer and go to room if call light pushed    Assessment:     Vy Hussein is a 84 y.o. female with an SLP diagnosis of Aphasia.  She presents with expressive aphasia.    Subjective       Patient goals: Patient will be Independently oriented x4, 5/5 therapy sessions.   Patient answered "wh" questions with 70% accuracy Independently.   Patient completed word finding tasks with 65% accuracy Independently.   Patient completed divergent naming tasks with 70% accuracy Independently.   Patient completed convergent naming tasks with 70% accuracy Independently.   Patient completed problem solving tasks with 70% accuracy Independently.   Patient completed verbal reasoning tasks with 70% accuracy Independently.   Patient completed short term memory tasks with 75% accuracy Independently.      Pain/Comfort:  Pain Rating 1: 0/10    Respiratory Status: Room air    Objective:     Has the patient been evaluated by SLP for swallowing?   No  Keep patient NPO? No   Current Respiratory Status:            Goals:   Multidisciplinary Problems       SLP Goals       Not on file                    Plan:     Patient to be seen:  3 x/week   Plan of Care expires:  01/13/25  Plan of Care reviewed with:  patient   SLP Follow-Up:  Yes       Discharge recommendations:  Moderate Intensity Therapy   Barriers to Discharge:  Safety Awareness      Time Tracking:     SLP Treatment Date:   01/01/25  Speech Start Time:  0910  Speech Stop Time:  1002     Speech Total Time (min):  52 min    Billable Minutes: Speech Therapy Individual      01/01/2025    Jenni AGUILARCC-SLP    "

## 2025-01-02 LAB
ANION GAP SERPL CALCULATED.3IONS-SCNC: 10 MMOL/L (ref 7–16)
BASOPHILS # BLD AUTO: 0.07 K/UL (ref 0–0.2)
BASOPHILS NFR BLD AUTO: 1 % (ref 0–1)
BUN SERPL-MCNC: 19 MG/DL (ref 10–20)
BUN/CREAT SERPL: 29 (ref 6–20)
CALCIUM SERPL-MCNC: 8.9 MG/DL (ref 8.4–10.2)
CHLORIDE SERPL-SCNC: 109 MMOL/L (ref 98–107)
CO2 SERPL-SCNC: 26 MMOL/L (ref 23–31)
CREAT SERPL-MCNC: 0.65 MG/DL (ref 0.55–1.02)
DIFFERENTIAL METHOD BLD: ABNORMAL
EGFR (NO RACE VARIABLE) (RUSH/TITUS): 87 ML/MIN/1.73M2
EOSINOPHIL # BLD AUTO: 0.28 K/UL (ref 0–0.5)
EOSINOPHIL NFR BLD AUTO: 3.9 % (ref 1–4)
ERYTHROCYTE [DISTWIDTH] IN BLOOD BY AUTOMATED COUNT: 15.3 % (ref 11.5–14.5)
GLUCOSE SERPL-MCNC: 87 MG/DL (ref 82–115)
HCT VFR BLD AUTO: 29.3 % (ref 38–47)
HGB BLD-MCNC: 9.9 G/DL (ref 12–16)
IMM GRANULOCYTES # BLD AUTO: 0.03 K/UL (ref 0–0.04)
IMM GRANULOCYTES NFR BLD: 0.4 % (ref 0–0.4)
LYMPHOCYTES # BLD AUTO: 1.55 K/UL (ref 1–4.8)
LYMPHOCYTES NFR BLD AUTO: 21.5 % (ref 27–41)
MAGNESIUM SERPL-MCNC: 1.9 MG/DL (ref 1.6–2.6)
MCH RBC QN AUTO: 30 PG (ref 27–31)
MCHC RBC AUTO-ENTMCNC: 33.8 G/DL (ref 32–36)
MCV RBC AUTO: 88.8 FL (ref 80–96)
MONOCYTES # BLD AUTO: 0.61 K/UL (ref 0–0.8)
MONOCYTES NFR BLD AUTO: 8.4 % (ref 2–6)
MPC BLD CALC-MCNC: 9.5 FL (ref 9.4–12.4)
NEUTROPHILS # BLD AUTO: 4.68 K/UL (ref 1.8–7.7)
NEUTROPHILS NFR BLD AUTO: 64.8 % (ref 53–65)
NRBC # BLD AUTO: 0 X10E3/UL
NRBC, AUTO (.00): 0 %
PHOSPHATE SERPL-MCNC: 3.3 MG/DL (ref 2.3–4.7)
PLATELET # BLD AUTO: 298 K/UL (ref 150–400)
POTASSIUM SERPL-SCNC: 3.5 MMOL/L (ref 3.5–5.1)
RBC # BLD AUTO: 3.3 M/UL (ref 4.2–5.4)
SODIUM SERPL-SCNC: 141 MMOL/L (ref 136–145)
WBC # BLD AUTO: 7.22 K/UL (ref 4.5–11)

## 2025-01-02 PROCEDURE — 97116 GAIT TRAINING THERAPY: CPT

## 2025-01-02 PROCEDURE — 92522 EVALUATE SPEECH PRODUCTION: CPT

## 2025-01-02 PROCEDURE — 84100 ASSAY OF PHOSPHORUS: CPT | Performed by: OBSTETRICS & GYNECOLOGY

## 2025-01-02 PROCEDURE — 25000003 PHARM REV CODE 250: Performed by: OBSTETRICS & GYNECOLOGY

## 2025-01-02 PROCEDURE — 36415 COLL VENOUS BLD VENIPUNCTURE: CPT | Performed by: OBSTETRICS & GYNECOLOGY

## 2025-01-02 PROCEDURE — 92507 TX SP LANG VOICE COMM INDIV: CPT

## 2025-01-02 PROCEDURE — 83735 ASSAY OF MAGNESIUM: CPT | Performed by: OBSTETRICS & GYNECOLOGY

## 2025-01-02 PROCEDURE — 94761 N-INVAS EAR/PLS OXIMETRY MLT: CPT

## 2025-01-02 PROCEDURE — 11000004 HC SNF PRIVATE

## 2025-01-02 PROCEDURE — 80048 BASIC METABOLIC PNL TOTAL CA: CPT | Performed by: OBSTETRICS & GYNECOLOGY

## 2025-01-02 PROCEDURE — 85025 COMPLETE CBC W/AUTO DIFF WBC: CPT | Performed by: OBSTETRICS & GYNECOLOGY

## 2025-01-02 PROCEDURE — 97110 THERAPEUTIC EXERCISES: CPT

## 2025-01-02 RX ADMIN — APIXABAN 2.5 MG: 2.5 TABLET, FILM COATED ORAL at 08:01

## 2025-01-02 RX ADMIN — ATORVASTATIN CALCIUM 20 MG: 20 TABLET, FILM COATED ORAL at 08:01

## 2025-01-02 RX ADMIN — ASPIRIN 81 MG CHEWABLE TABLET 81 MG: 81 TABLET CHEWABLE at 08:01

## 2025-01-02 RX ADMIN — DONEPEZIL HYDROCHLORIDE 5 MG: 5 TABLET ORAL at 08:01

## 2025-01-02 RX ADMIN — DOXYCYCLINE HYCLATE 100 MG: 100 TABLET, COATED ORAL at 08:01

## 2025-01-02 RX ADMIN — HYDROCORTISONE: 25 CREAM TOPICAL at 08:01

## 2025-01-02 RX ADMIN — SENNOSIDES AND DOCUSATE SODIUM 1 TABLET: 50; 8.6 TABLET ORAL at 08:01

## 2025-01-02 RX ADMIN — Medication 6 MG: at 08:01

## 2025-01-02 RX ADMIN — THERA TABS 1 TABLET: TAB at 08:01

## 2025-01-02 NOTE — NURSING
Spoke with Linda at Ortho office. Orders received to d/c aquacel and any staples pt may have.  Keep covered with dry dressing. Follow up appointment 1/8/2025 at 9 am at [facility]. Nurse will fax orders to place in chart.

## 2025-01-02 NOTE — PLAN OF CARE
Problem: Adult Inpatient Plan of Care  Goal: Absence of Hospital-Acquired Illness or Injury  Outcome: Progressing  Intervention: Identify and Manage Fall Risk  Flowsheets (Taken 1/2/2025 1454)  Safety Promotion/Fall Prevention:   assistive device/personal item within reach   chair alarm set   Fall Risk signage in place   Fall Risk reviewed with patient/family   high risk medications identified   in recliner, wheels locked   instructed to call staff for mobility  Intervention: Prevent Skin Injury  Flowsheets (Taken 1/2/2025 1454)  Body Position: weight shifting  Skin Protection:   incontinence pads utilized   protective footwear used  Device Skin Pressure Protection:   adhesive use limited   pressure points protected  Intervention: Prevent and Manage VTE (Venous Thromboembolism) Risk  Flowsheets (Taken 1/2/2025 1454)  VTE Prevention/Management:   ambulation promoted   bleeding risk assessed   bleeding precautions maintained   fluids promoted  Intervention: Prevent Infection  Flowsheets (Taken 1/2/2025 1454)  Infection Prevention:   hand hygiene promoted   equipment surfaces disinfected   rest/sleep promoted   single patient room provided     Problem: Fall Injury Risk  Goal: Absence of Fall and Fall-Related Injury  Outcome: Progressing  Intervention: Identify and Manage Contributors  Flowsheets (Taken 1/2/2025 1454)  Self-Care Promotion:   independence encouraged   BADL personal objects within reach   BADL personal routines maintained   meal set-up provided   adaptive equipment use encouraged  Medication Review/Management: medications reviewed  Intervention: Promote Injury-Free Environment  Flowsheets (Taken 1/2/2025 1454)  Safety Promotion/Fall Prevention:   assistive device/personal item within reach   chair alarm set   Fall Risk signage in place   Fall Risk reviewed with patient/family   high risk medications identified   in recliner, wheels locked   instructed to call staff for mobility

## 2025-01-02 NOTE — PT/OT/SLP PROGRESS
Physical Therapy Treatment    Patient Name:  Vy Hussein   MRN:  [id number]    Recommendations:     Discharge Recommendations: Low Intensity Therapy  Discharge Equipment Recommendations: 3-in-1 commode, shower chair, walker, rolling  Barriers to discharge: None    Assessment:     Vy Hussein is a 84 y.o. female admitted with a medical diagnosis of Muscular weakness.  She presents with the following impairments/functional limitations: weakness, impaired endurance, impaired functional mobility, gait instability, impaired balance, decreased lower extremity function, decreased safety awareness, pain, decreased coordination . PT provided verbal and tactile cueing to increase movement through available ROM with exercises. Patient ambulated with a slow cautious gait pattern. No adverse effects noted to treatment.     Rehab Prognosis: Good; patient would benefit from acute skilled PT services to address these deficits and reach maximum level of function.    Recent Surgery: * No surgery found *      Plan:     During this hospitalization, patient to be seen 5 x/week to address the identified rehab impairments via gait training, therapeutic activities, therapeutic exercises, neuromuscular re-education and progress toward the following goals:    Plan of Care Expires:  01/14/25    Subjective     Chief Complaint: none  Patient/Family Comments/goals: improve safety and independence with mobility for safe return home.   Pain/Comfort:  Pain Rating 1: 0/10      Objective:     Communicated with patient prior to session.  Patient denied pain and was agreeable to treatment session.     General Precautions: Standard, fall  Orthopedic Precautions: RLE weight bearing as tolerated  Braces: N/A  Respiratory Status: Room air     Functional Mobility:  Transfers:     Sit to Stand:  SBA with rolling walker  Gait: 100 ft with rolling walker with CGA and verbal cue to look ahead when walking      AM-PAC 6 CLICK MOBILITY  Turning over in  "bed (including adjusting bedclothes, sheets and blankets)?: 3  Sitting down on and standing up from a chair with arms (e.g., wheelchair, bedside commode, etc.): 3  Moving from lying on back to sitting on the side of the bed?: 2  Moving to and from a bed to a chair (including a wheelchair)?: 3  Need to walk in hospital room?: 3  Climbing 3-5 steps with a railing?: 1  Basic Mobility Total Score: 15       Treatment & Education:  Therapeutic Exercise  Reps        LAQs  2 x 10 each   Hamstring Curls  10 x each yellow TB    Hip ADD  3 x 10 3"    Hip ABD  2 x 10 yellow TB    Ankle Pumps     Quad Sets  2 x 10 3"    Glute Sets  2 x 10 3"    Straight leg raises  10 x each    Seated Marching     Horizontal Hip ABD/ADD  10 x each        Therapeutic Activities  Reps        Sit <>stand     Standing Marching     Heel Raises     Mini Squats           Patient left up in chair with call button in reach..    GOALS:   Multidisciplinary Problems       Physical Therapy Goals          Problem: Physical Therapy    Goal Priority Disciplines Outcome Interventions   Physical Therapy Goal     PT, PT/OT     Description: Short term goals  1. Pt will require min assist for bed mobility  2. Pt will require min assist for sit to stand transfer   3. Pt will ambulate 25 feet with RW and CGA    Long term goals  1. Pt will be independent with all bed mobility  2. Pt will be independent/mod independent with sit to stand transfer  3. Pt will perform 5 sit to stands with only 1 UE for assistance  4. Pt will ambulate 50 feet with RW and CGA  5. Pt will be independent with ascending and descending 2 standard height steps with handrail                       Time Tracking:     PT Received On: 01/02/25  PT Start Time: 1256     PT Stop Time: 1328  PT Total Time (min): 32 min     Billable Minutes: Therapeutic Exercise 22 and Gait 10    Rodriguez San, PT, DPT       Treatment Type: Treatment  PT/PTA: PT     Number of PTA visits since last PT visit: 0 "     01/02/2025

## 2025-01-02 NOTE — PLAN OF CARE
[facility] - Medical Surgical Unit - Swing Bed   Interdisciplinary Team Meeting    Patient: Vy Hussein   Today's Date: 1/2/2025   Estimated D/C Date: 1/13/2025        Physician: Ema Chamorro MD Unit Director: Alexa Sood RN   Pharmacy: Tawyna Haro, PharmD Nursing: JAZZY Sweeney RN   : Nicky Magana RN Physical/Occupational Therapy: Lizeth Negron OT, VIPIN San PT   Speech Therapy: ST Mendoza Respiratory: See respiratory notes   Dietary: See dietary notes   Other: ELIZABETH Can, RT     Nursing  New Symptoms/Problems: none at this time      Urine: incontinent  Judd: No   Bowel: continent  Last Bowel Movement: 01/01/25   Constipated: No  Diarrhea: No   Isolation: No  Cognition: WNL  Aspiration Precautions: No  Wound Care: No  Wound Location/Tx: na  Comment(s): na     Respiratory  O2 Device: Room Air  O2 Flow: na  SpO2: 98%  Neb Tx: No  Comment(s): na     Dietary  Nutrition:  mechanical soft  Comment(s): na    Speech Therapy  Speech/Swallowing: Speech difficulty  Comment(s): expressive aphasia    Physical Therapy  Gait/Assistive Device: Approx. 10' + 45' with RW and mod assist x 1 on level indoor surface with verbal and visual cues for proper step length and walker placement.      ELOS: Plan to DC 1/13/2025    Transfers: Moderate Assistance  Bed Mobility: Activity did not occur Range of Motion/Restrictions: RLE weight bearing as tolerated   Comment(s): na      Occupational Therapy  Eating/Grooming: Supervision or Set-up Assistance Toileting: Minimal Assistance   Bathing: Minimal Assistance Dressing (Upper Body): Minimal Assistance   Dressing (Lower Body): Moderate Assistance   Comment(s): na   Activity Therapy  Level of participation: Active participation  Comment(s): na    Pharmacy  Medication Changes: No  Labs Reviewed: Yes  New Lab Orders: No  Comment(s): labs q mon/thurs      Tx Plan/Recommendations reviewed with family and/or patient on 1/2/25.  Additional  family Conference/Training: adam  D/C Plan/Recommendations: Home with HH and Home with family  REESE: 1/13/2025  Comment(s): adam

## 2025-01-02 NOTE — PT/OT/SLP PROGRESS
"Speech Language Pathology Treatment    Patient Name:  Vy Hussein   MRN:  [id number]  Admitting Diagnosis: Muscular weakness    Recommendations:                 General Recommendations:  Speech/language therapy  Diet recommendations:   ,     Aspiration Precautions:       General Precautions: Standard,    Communication strategies:  provide increased time to answer and go to room if call light pushed    Assessment:     Vy Hussein is a 84 y.o. female with an SLP diagnosis of Aphasia.  She presents with expressive aphasia.    Subjective       Patient goals: Patient will be Independently oriented x4, 5/5 therapy sessions.   Patient answered "wh" questions with 70% accuracy Independently.   Patient completed word finding tasks with 70% accuracy Independently.   Patient completed divergent naming tasks with 70% accuracy Independently.   Patient completed convergent naming tasks with 70% accuracy Independently.   Patient completed problem solving tasks with 70% accuracy Independently.   Patient completed verbal reasoning tasks with 70% accuracy Independently.   Patient completed short term memory tasks with 75% accuracy Independently.      When pt uses slow speech; pt is intelligible.    Pain/Comfort:  Pain Rating 1: 0/10  Pain Rating Post-Intervention 1: 0/10    Respiratory Status: Room air    Objective:     Has the patient been evaluated by SLP for swallowing?   No  Keep patient NPO? No   Current Respiratory Status:            Goals:   Multidisciplinary Problems       SLP Goals       Not on file                    Plan:     Patient to be seen:  3 x/week   Plan of Care expires:  01/13/25  Plan of Care reviewed with:  patient   SLP Follow-Up:  Yes       Discharge recommendations:  Moderate Intensity Therapy   Barriers to Discharge:  Safety Awareness      Time Tracking:     SLP Treatment Date:   01/02/25  Speech Start Time:  0750  Speech Stop Time:  0835     Speech Total Time (min):  45 min    Billable Minutes: " Speech Therapy Individual      01/02/2025    Jenni Corado MSCCC-SLP

## 2025-01-02 NOTE — PLAN OF CARE
Problem: Adult Inpatient Plan of Care  Goal: Plan of Care Review  Outcome: Progressing  Goal: Patient-Specific Goal (Individualized)  Outcome: Progressing  Goal: Absence of Hospital-Acquired Illness or Injury  Outcome: Progressing  Goal: Optimal Comfort and Wellbeing  Outcome: Progressing     Problem: Skin Injury Risk Increased  Goal: Skin Health and Integrity  Outcome: Progressing     Problem: Fall Injury Risk  Goal: Absence of Fall and Fall-Related Injury  Outcome: Progressing  Intervention: Identify and Manage Contributors  Flowsheets (Taken 1/1/2025 2131)  Self-Care Promotion:   independence encouraged   BADL personal objects within reach   BADL personal routines maintained   adaptive equipment use encouraged  Medication Review/Management: medications reviewed  Intervention: Promote Injury-Free Environment  Flowsheets (Taken 1/1/2025 2131)  Safety Promotion/Fall Prevention:   assistive device/personal item within reach   bed alarm set   chair alarm set   diversional activities provided   side rails raised x 3   nonskid shoes/socks when out of bed   muscle strengthening facilitated   medications reviewed   lighting adjusted   instructed to call staff for mobility

## 2025-01-02 NOTE — NURSING
Spoke with pt's nephew, Solo.  Stated he would be able to take pt to her appointment on Wednesday.    Aquacel d/c'd per Dr. Saunders. Marquise intact at this time. Covered with island dressing as ordered.  Staples will d/c Monday 1/6/2024. Order to change dressing only if wet.

## 2025-01-02 NOTE — NURSING
Attempted to call nephew to inform him about follow up appt. No answer at this time.  Will attempt again.

## 2025-01-02 NOTE — PT/OT/SLP PROGRESS
Occupational Therapy   Treatment    Name: Vy Hussein  MRN: [id number]  Admitting Diagnosis:  Muscular weakness       Recommendations:     Discharge Recommendations: Moderate Intensity Therapy  Discharge Equipment Recommendations:  3-in-1 commode, walker, rolling  Barriers to discharge:  Decreased caregiver support    Assessment:     Vy Hussein is a 84 y.o. female with a medical diagnosis of Muscular weakness.  She presents with weakness. Performance deficits affecting function are impaired endurance, weakness, impaired self care skills, impaired functional mobility, impaired balance, decreased lower extremity function, decreased safety awareness.     Rehab Prognosis:  Good; patient would benefit from acute skilled OT services to address these deficits and reach maximum level of function.       Plan:     Patient to be seen 5 x/week to address the above listed problems via therapeutic exercises, therapeutic activities, self-care/home management  Plan of Care Expires:    Plan of Care Reviewed with: patient    Subjective     Chief Complaint: Pt had no complaints  Patient/Family Comments/goals: return to PLOF  Pain/Comfort:       Objective:     Communicated with: Pt prior to session.  Patient found up in chair with   upon OT entry to room.    General Precautions: Standard, fall    Orthopedic Precautions:   Braces:    Respiratory Status: Room air     Occupational Performance:     Bed Mobility:         Functional Mobility/Transfers:    Functional Mobility:     Activities of Daily Living:        [facility] 6 Click ADL:      Treatment & Education:  Pt completed BUE elbow flex, chest press, and sh flex with 3# dowel, sh retraction with yellow Tband, and yellow theraflex 4x10 ea ex to increase strength and endurance for improved performance in self care skills    Patient left up in chair with call button in reach and chair alarm on    GOALS:   Multidisciplinary Problems       Occupational Therapy Goals          Problem:  Occupational Therapy    Goal Priority Disciplines Outcome Interventions   Occupational Therapy Goal     OT, PT/OT Progressing    Description: STG:  Pt will perform grooming with setup  Pt will bathe with Mod I  Pt will perform UE dressing with I after set up  Pt will perform LE dressing with Mod I  Pt will sit EOB x 15 min with no assistance  Pt will transfer bed/chair/bsc with Mod I  Pt will perform standing task x 15 min with supervision assistance  Pt will tolerate 45 minutes of tx without fatigue      LT.Restore to max I with self care and mobility.                         Time Tracking:     OT Date of Treatment: 25  OT Start Time: 1340  OT Stop Time: 1403  OT Total Time (min): 23 min    Billable Minutes:Therapeutic Exercise 23          Number of PHILIP visits since last OT visit: 2025

## 2025-01-03 PROCEDURE — 97530 THERAPEUTIC ACTIVITIES: CPT | Mod: CQ

## 2025-01-03 PROCEDURE — 97116 GAIT TRAINING THERAPY: CPT | Mod: CQ

## 2025-01-03 PROCEDURE — 11000004 HC SNF PRIVATE

## 2025-01-03 PROCEDURE — 25000003 PHARM REV CODE 250: Performed by: EMERGENCY MEDICINE

## 2025-01-03 PROCEDURE — 94761 N-INVAS EAR/PLS OXIMETRY MLT: CPT

## 2025-01-03 PROCEDURE — 97110 THERAPEUTIC EXERCISES: CPT | Mod: CQ

## 2025-01-03 PROCEDURE — 99308 SBSQ NF CARE LOW MDM 20: CPT | Mod: ,,, | Performed by: FAMILY MEDICINE

## 2025-01-03 PROCEDURE — 25000003 PHARM REV CODE 250: Performed by: OBSTETRICS & GYNECOLOGY

## 2025-01-03 PROCEDURE — 92507 TX SP LANG VOICE COMM INDIV: CPT

## 2025-01-03 PROCEDURE — 92526 ORAL FUNCTION THERAPY: CPT

## 2025-01-03 RX ADMIN — DONEPEZIL HYDROCHLORIDE 5 MG: 5 TABLET ORAL at 08:01

## 2025-01-03 RX ADMIN — GUAIFENESIN 200 MG: 100 LIQUID ORAL at 08:01

## 2025-01-03 RX ADMIN — ASPIRIN 81 MG CHEWABLE TABLET 81 MG: 81 TABLET CHEWABLE at 08:01

## 2025-01-03 RX ADMIN — ATORVASTATIN CALCIUM 20 MG: 20 TABLET, FILM COATED ORAL at 08:01

## 2025-01-03 RX ADMIN — APIXABAN 2.5 MG: 2.5 TABLET, FILM COATED ORAL at 08:01

## 2025-01-03 RX ADMIN — THERA TABS 1 TABLET: TAB at 08:01

## 2025-01-03 RX ADMIN — DOXYCYCLINE HYCLATE 100 MG: 100 TABLET, COATED ORAL at 08:01

## 2025-01-03 RX ADMIN — SENNOSIDES AND DOCUSATE SODIUM 1 TABLET: 50; 8.6 TABLET ORAL at 08:01

## 2025-01-03 RX ADMIN — HYDROCORTISONE: 25 CREAM TOPICAL at 08:01

## 2025-01-03 RX ADMIN — POLYETHYLENE GLYCOL 3350 17 G: 17 POWDER, FOR SOLUTION ORAL at 08:01

## 2025-01-03 NOTE — PLAN OF CARE
Pt's insurance changed to [employer]. Insurance approved additional swing bed days. Next review date is 1/7/25.

## 2025-01-03 NOTE — PROGRESS NOTES
[facility]  Medical Surgical Unit  Hospital Medicine  Progress Note    Patient Name: Vy Hussein  MRN: [id number]  Patient Class: IP- Swing   Admission Date: 12/24/2024  Length of Stay: 10 days  Attending Physician: Elizabeth Castro MD  Primary Care Provider: Flora Brennan MD        Subjective     Principal Problem:Muscular weakness        HPI:  No notes on file    Overview/Hospital Course:  12/27/24 - up in chair. No complaints voiced.    12/30/24 - doing well today. No complaints voiced.    1/3/25 - has a cough this AM. Has cough syrup ordered. Will continue present treatment.    Interval History: will continue present treatment.    Review of Systems  Objective:     Vital Signs (Most Recent):  Temp: 97.4 °F (36.3 °C) (01/03/25 0805)  Pulse: 86 (01/03/25 0813)  Resp: 20 (01/03/25 0813)  BP: 126/67 (01/03/25 0805)  SpO2: 98 % (01/03/25 0813) Vital Signs (24h Range):  Temp:  [97.4 °F (36.3 °C)-98 °F (36.7 °C)] 97.4 °F (36.3 °C)  Pulse:  [82-91] 86  Resp:  [18-20] 20  SpO2:  [96 %-98 %] 98 %  BP: (126-136)/(67-75) 126/67     Weight: 43 kg (94 lb 12.8 oz)  Body mass index is 15.3 kg/m².    Intake/Output Summary (Last 24 hours) at 1/3/2025 0833  Last data filed at 1/2/2025 1755  Gross per 24 hour   Intake 120 ml   Output --   Net 120 ml         Physical Exam        Significant Labs: All pertinent labs within the past 24 hours have been reviewed.    Significant Imaging: I have reviewed all pertinent imaging results/findings within the past 24 hours.    Assessment and Plan     No notes have been filed under this hospital service.  Service: Hospital Medicine    VTE Risk Mitigation (From admission, onward)           Ordered     apixaban tablet 2.5 mg  2 times daily         12/25/24 1056     IP VTE HIGH RISK PATIENT  Once         12/24/24 1544                    Discharge Planning   REESE: 1/13/2025     Code Status: Full Code   Medical Readiness for Discharge Date:   Discharge Plan A: Home, Home Health                 Please place Justification for DME        Ema Chamorro MD  Department of Hospital Medicine   [facility] - Medical Surgical Unit

## 2025-01-03 NOTE — SUBJECTIVE & OBJECTIVE
Interval History: will continue present treatment.    Review of Systems  Objective:     Vital Signs (Most Recent):  Temp: 97.4 °F (36.3 °C) (01/03/25 0805)  Pulse: 86 (01/03/25 0813)  Resp: 20 (01/03/25 0813)  BP: 126/67 (01/03/25 0805)  SpO2: 98 % (01/03/25 0813) Vital Signs (24h Range):  Temp:  [97.4 °F (36.3 °C)-98 °F (36.7 °C)] 97.4 °F (36.3 °C)  Pulse:  [82-91] 86  Resp:  [18-20] 20  SpO2:  [96 %-98 %] 98 %  BP: (126-136)/(67-75) 126/67     Weight: 43 kg (94 lb 12.8 oz)  Body mass index is 15.3 kg/m².    Intake/Output Summary (Last 24 hours) at 1/3/2025 0833  Last data filed at 1/2/2025 1755  Gross per 24 hour   Intake 120 ml   Output --   Net 120 ml         Physical Exam        Significant Labs: All pertinent labs within the past 24 hours have been reviewed.    Significant Imaging: I have reviewed all pertinent imaging results/findings within the past 24 hours.

## 2025-01-03 NOTE — PT/OT/SLP PROGRESS
Physical Therapy Treatment    Patient Name:  Vy Hussein   MRN:  [id number]    Recommendations:     Discharge Recommendations: Low Intensity Therapy  Discharge Equipment Recommendations: 3-in-1 commode, walker, rolling, shower chair  Barriers to discharge: None    Assessment:     Vy Hussein is a 84 y.o. female admitted with a medical diagnosis of Muscular weakness.  She presents with the following impairments/functional limitations: weakness, impaired endurance, impaired self care skills, impaired functional mobility, gait instability, impaired balance, decreased upper extremity function, decreased lower extremity function, decreased safety awareness, pain . Patient provided verbal and tactile cueing to increase movement through available ROM with exercises. Patient able to complete added standing Therapeutic activities with increased time and effort and cues for speed of movement, alignment, count, and hold times.  Patient ambulated with a slow cautious gait pattern. No adverse effects noted to treatment.     Rehab Prognosis: Good; patient would benefit from acute skilled PT services to address these deficits and reach maximum level of function.    Recent Surgery: * No surgery found *      Plan:     During this hospitalization, patient to be seen 5 x/week to address the identified rehab impairments via gait training, therapeutic exercises and progress toward the following goals:    Plan of Care Expires:  01/14/25    Subjective     Chief Complaint: none  Patient/Family Comments/goals: improve safety and independence with mobility for safe return home.   Pain/Comfort:  Pain Rating 1: 0/10      Objective:     Communicated with patient prior to session.  Patient denied pain and was agreeable to treatment session.     General Precautions: Standard, fall  Orthopedic Precautions: RLE weight bearing as tolerated  Braces: N/A  Respiratory Status: Room air     Functional Mobility:  Transfers:     Sit to Stand:   "SBA with rolling walker  Gait: 120 ft with rolling walker with CGA and verbal cue to look ahead when walking      AM-PAC 6 CLICK MOBILITY  Turning over in bed (including adjusting bedclothes, sheets and blankets)?: 3  Sitting down on and standing up from a chair with arms (e.g., wheelchair, bedside commode, etc.): 3  Moving from lying on back to sitting on the side of the bed?: 2  Moving to and from a bed to a chair (including a wheelchair)?: 3  Need to walk in hospital room?: 3  Climbing 3-5 steps with a railing?: 1  Basic Mobility Total Score: 15       Treatment & Education:  Therapeutic Exercise  Reps        LAQs  2 x 10 each   Hamstring Curls     Hip ADD  3 x 10 3"    Short Arc Quads'  3 x 10    Ankle Pumps     Quad Sets     Glute Sets  2 x 10 3"    Seated hip rotation  2 x 10    Seated Marching  2 x 10,   Horizontal Hip ABD/ADD         Therapeutic Activities  Reps        Sit <>stand  3 x    Right LE hip and knee flexion  2 x 10    Heel Raises  2 x 10    Right LE hip extension  2 x 10    Right LE hip abduction  2 x 10      Patient left up in chair with call button in reach..    GOALS:   Multidisciplinary Problems       Physical Therapy Goals          Problem: Physical Therapy    Goal Priority Disciplines Outcome Interventions   Physical Therapy Goal     PT, PT/OT     Description: Short term goals  1. Pt will require min assist for bed mobility  2. Pt will require min assist for sit to stand transfer   3. Pt will ambulate 25 feet with RW and CGA    Long term goals  1. Pt will be independent with all bed mobility  2. Pt will be independent/mod independent with sit to stand transfer  3. Pt will perform 5 sit to stands with only 1 UE for assistance  4. Pt will ambulate 50 feet with RW and CGA  5. Pt will be independent with ascending and descending 2 standard height steps with handrail                       Time Tracking:     PT Received On: 01/03/25  PT Start Time: 1310     PT Stop Time: 1350  PT Total Time (min): " 40 min     Billable Minutes: Therapeutic Exercise 20 and Gait 10, Therapeutic activities 10     Treatment Type: Treatment  PT/PTA: PTA     Number of PTA visits since last PT visit: 1     Continue Plan of Care per PT order to progress patient toward rehab goals as tolerated by patient.   PERLA Rosenthal   01/03/2025

## 2025-01-03 NOTE — PLAN OF CARE
Problem: Adult Inpatient Plan of Care  Goal: Plan of Care Review  Outcome: Progressing  Goal: Patient-Specific Goal (Individualized)  Outcome: Progressing     Problem: Infection  Goal: Absence of Infection Signs and Symptoms  Outcome: Progressing

## 2025-01-03 NOTE — PLAN OF CARE
Problem: Adult Inpatient Plan of Care  Goal: Plan of Care Review  Outcome: Progressing  Goal: Patient-Specific Goal (Individualized)  Outcome: Progressing  Goal: Absence of Hospital-Acquired Illness or Injury  Outcome: Progressing     Problem: Skin Injury Risk Increased  Goal: Skin Health and Integrity  Outcome: Progressing     Problem: Fall Injury Risk  Goal: Absence of Fall and Fall-Related Injury  Outcome: Progressing  Intervention: Identify and Manage Contributors  Flowsheets (Taken 1/2/2025 2030)  Self-Care Promotion:   independence encouraged   BADL personal objects within reach   BADL personal routines maintained   adaptive equipment use encouraged  Medication Review/Management: medications reviewed  Intervention: Promote Injury-Free Environment  Flowsheets (Taken 1/2/2025 2030)  Safety Promotion/Fall Prevention:   assistive device/personal item within reach   bed alarm set   diversional activities provided   instructed to call staff for mobility   lighting adjusted   medications reviewed   muscle strengthening facilitated   nonskid shoes/socks when out of bed   side rails raised x 3

## 2025-01-03 NOTE — PT/OT/SLP PROGRESS
"Speech Language Pathology Treatment    Patient Name:  Vy Hussein   MRN:  [id number]  Admitting Diagnosis: Muscular weakness    Recommendations:                 General Recommendations:  Speech/language therapy  Diet recommendations:   ,     Aspiration Precautions:       General Precautions: Standard,    Communication strategies:  provide increased time to answer and go to room if call light pushed    Assessment:     Vy Hussein is a 84 y.o. female with an SLP diagnosis of Aphasia.  She presents with expressive aphasia.    Subjective       Patient goals: Patient will be Independently oriented x4, 5/5 therapy sessions.   Patient answered "wh" questions with 70% accuracy Independently.   Patient completed word finding tasks with 70% accuracy Independently.   Patient completed divergent naming tasks with 70% accuracy Independently.   Patient completed convergent naming tasks with 70% accuracy Independently.   Patient completed problem solving tasks with 70% accuracy Independently.   Patient completed verbal reasoning tasks with 70% accuracy Independently.   Patient completed short term memory tasks with 75% accuracy Independently.      When pt uses slow speech; pt is intelligible.    Pain/Comfort:  Pain Rating 1: 0/10    Respiratory Status: Room air    Objective:     Has the patient been evaluated by SLP for swallowing?   No  Keep patient NPO? No   Current Respiratory Status:            Goals:   Multidisciplinary Problems       SLP Goals       Not on file                    Plan:     Patient to be seen:  3 x/week   Plan of Care expires:  01/13/25  Plan of Care reviewed with:  patient   SLP Follow-Up:  Yes       Discharge recommendations:  Moderate Intensity Therapy   Barriers to Discharge:  Safety Awareness      Time Tracking:     SLP Treatment Date:   01/03/25  Speech Start Time:  1000  Speech Stop Time:  1100     Speech Total Time (min):  60 min    Billable Minutes: Speech Therapy Individual  "     01/03/2025    Jenni Corado MSCCC-SLP

## 2025-01-04 PROCEDURE — 94761 N-INVAS EAR/PLS OXIMETRY MLT: CPT

## 2025-01-04 PROCEDURE — 11000004 HC SNF PRIVATE

## 2025-01-04 PROCEDURE — 25000003 PHARM REV CODE 250: Performed by: OBSTETRICS & GYNECOLOGY

## 2025-01-04 RX ADMIN — THERA TABS 1 TABLET: TAB at 08:01

## 2025-01-04 RX ADMIN — SENNOSIDES AND DOCUSATE SODIUM 1 TABLET: 50; 8.6 TABLET ORAL at 08:01

## 2025-01-04 RX ADMIN — ATORVASTATIN CALCIUM 20 MG: 20 TABLET, FILM COATED ORAL at 09:01

## 2025-01-04 RX ADMIN — HYDROCORTISONE: 25 CREAM TOPICAL at 09:01

## 2025-01-04 RX ADMIN — DONEPEZIL HYDROCHLORIDE 5 MG: 5 TABLET ORAL at 08:01

## 2025-01-04 RX ADMIN — APIXABAN 2.5 MG: 2.5 TABLET, FILM COATED ORAL at 08:01

## 2025-01-04 RX ADMIN — ASPIRIN 81 MG CHEWABLE TABLET 81 MG: 81 TABLET CHEWABLE at 08:01

## 2025-01-04 RX ADMIN — DOXYCYCLINE HYCLATE 100 MG: 100 TABLET, COATED ORAL at 08:01

## 2025-01-04 RX ADMIN — HYDROCORTISONE: 25 CREAM TOPICAL at 08:01

## 2025-01-04 RX ADMIN — POLYETHYLENE GLYCOL 3350 17 G: 17 POWDER, FOR SOLUTION ORAL at 08:01

## 2025-01-04 NOTE — PLAN OF CARE
Problem: Adult Inpatient Plan of Care  Goal: Plan of Care Review  Outcome: Progressing  Goal: Patient-Specific Goal (Individualized)  Outcome: Progressing  Goal: Absence of Hospital-Acquired Illness or Injury  Outcome: Progressing  Goal: Optimal Comfort and Wellbeing  Outcome: Progressing  Goal: Readiness for Transition of Care  Outcome: Progressing     Problem: Skin Injury Risk Increased  Goal: Skin Health and Integrity  Outcome: Progressing     Problem: Fall Injury Risk  Goal: Absence of Fall and Fall-Related Injury  Outcome: Progressing     Problem: Infection  Goal: Absence of Infection Signs and Symptoms  Outcome: Progressing     Problem: Wound  Goal: Optimal Coping  Outcome: Progressing  Goal: Optimal Functional Ability  Outcome: Progressing  Goal: Absence of Infection Signs and Symptoms  Outcome: Progressing  Goal: Improved Oral Intake  Outcome: Progressing  Goal: Optimal Pain Control and Function  Outcome: Progressing  Goal: Skin Health and Integrity  Outcome: Progressing  Goal: Optimal Wound Healing  Outcome: Progressing     Problem: Communication Impairment  Goal: Effective Communication Skills  Outcome: Progressing     Problem: Pain Acute  Goal: Optimal Pain Control and Function  Outcome: Progressing

## 2025-01-05 PROCEDURE — 11000004 HC SNF PRIVATE

## 2025-01-05 PROCEDURE — 94761 N-INVAS EAR/PLS OXIMETRY MLT: CPT

## 2025-01-05 PROCEDURE — 25000003 PHARM REV CODE 250: Performed by: OBSTETRICS & GYNECOLOGY

## 2025-01-05 RX ADMIN — DOXYCYCLINE HYCLATE 100 MG: 100 TABLET, COATED ORAL at 08:01

## 2025-01-05 RX ADMIN — DONEPEZIL HYDROCHLORIDE 5 MG: 5 TABLET ORAL at 08:01

## 2025-01-05 RX ADMIN — HYDROCORTISONE: 25 CREAM TOPICAL at 08:01

## 2025-01-05 RX ADMIN — ATORVASTATIN CALCIUM 20 MG: 20 TABLET, FILM COATED ORAL at 08:01

## 2025-01-05 RX ADMIN — APIXABAN 2.5 MG: 2.5 TABLET, FILM COATED ORAL at 08:01

## 2025-01-05 RX ADMIN — SENNOSIDES AND DOCUSATE SODIUM 1 TABLET: 50; 8.6 TABLET ORAL at 08:01

## 2025-01-05 RX ADMIN — THERA TABS 1 TABLET: TAB at 08:01

## 2025-01-05 RX ADMIN — ASPIRIN 81 MG CHEWABLE TABLET 81 MG: 81 TABLET CHEWABLE at 08:01

## 2025-01-05 RX ADMIN — POLYETHYLENE GLYCOL 3350 17 G: 17 POWDER, FOR SOLUTION ORAL at 08:01

## 2025-01-06 LAB
ANION GAP SERPL CALCULATED.3IONS-SCNC: 12 MMOL/L (ref 7–16)
BASOPHILS # BLD AUTO: 0.09 K/UL (ref 0–0.2)
BASOPHILS NFR BLD AUTO: 1.1 % (ref 0–1)
BUN SERPL-MCNC: 22 MG/DL (ref 10–20)
BUN/CREAT SERPL: 34 (ref 6–20)
CALCIUM SERPL-MCNC: 9.1 MG/DL (ref 8.4–10.2)
CHLORIDE SERPL-SCNC: 106 MMOL/L (ref 98–107)
CO2 SERPL-SCNC: 25 MMOL/L (ref 23–31)
CREAT SERPL-MCNC: 0.65 MG/DL (ref 0.55–1.02)
DIFFERENTIAL METHOD BLD: ABNORMAL
EGFR (NO RACE VARIABLE) (RUSH/TITUS): 87 ML/MIN/1.73M2
EOSINOPHIL # BLD AUTO: 0.16 K/UL (ref 0–0.5)
EOSINOPHIL NFR BLD AUTO: 2 % (ref 1–4)
ERYTHROCYTE [DISTWIDTH] IN BLOOD BY AUTOMATED COUNT: 15.8 % (ref 11.5–14.5)
GLUCOSE SERPL-MCNC: 86 MG/DL (ref 82–115)
HCT VFR BLD AUTO: 29.2 % (ref 38–47)
HGB BLD-MCNC: 9.8 G/DL (ref 12–16)
IMM GRANULOCYTES # BLD AUTO: 0.02 K/UL (ref 0–0.04)
IMM GRANULOCYTES NFR BLD: 0.2 % (ref 0–0.4)
LYMPHOCYTES # BLD AUTO: 1.59 K/UL (ref 1–4.8)
LYMPHOCYTES NFR BLD AUTO: 19.5 % (ref 27–41)
MAGNESIUM SERPL-MCNC: 2 MG/DL (ref 1.6–2.6)
MCH RBC QN AUTO: 30 PG (ref 27–31)
MCHC RBC AUTO-ENTMCNC: 33.6 G/DL (ref 32–36)
MCV RBC AUTO: 89.3 FL (ref 80–96)
MONOCYTES # BLD AUTO: 0.54 K/UL (ref 0–0.8)
MONOCYTES NFR BLD AUTO: 6.6 % (ref 2–6)
MPC BLD CALC-MCNC: 9.5 FL (ref 9.4–12.4)
NEUTROPHILS # BLD AUTO: 5.74 K/UL (ref 1.8–7.7)
NEUTROPHILS NFR BLD AUTO: 70.6 % (ref 53–65)
NRBC # BLD AUTO: 0 X10E3/UL
NRBC, AUTO (.00): 0 %
PHOSPHATE SERPL-MCNC: 3.1 MG/DL (ref 2.3–4.7)
PLATELET # BLD AUTO: 321 K/UL (ref 150–400)
POTASSIUM SERPL-SCNC: 3.7 MMOL/L (ref 3.5–5.1)
RBC # BLD AUTO: 3.27 M/UL (ref 4.2–5.4)
SODIUM SERPL-SCNC: 139 MMOL/L (ref 136–145)
WBC # BLD AUTO: 8.14 K/UL (ref 4.5–11)

## 2025-01-06 PROCEDURE — 80048 BASIC METABOLIC PNL TOTAL CA: CPT | Performed by: OBSTETRICS & GYNECOLOGY

## 2025-01-06 PROCEDURE — 84100 ASSAY OF PHOSPHORUS: CPT | Performed by: OBSTETRICS & GYNECOLOGY

## 2025-01-06 PROCEDURE — 85025 COMPLETE CBC W/AUTO DIFF WBC: CPT | Performed by: OBSTETRICS & GYNECOLOGY

## 2025-01-06 PROCEDURE — 25000003 PHARM REV CODE 250: Performed by: OBSTETRICS & GYNECOLOGY

## 2025-01-06 PROCEDURE — 92507 TX SP LANG VOICE COMM INDIV: CPT

## 2025-01-06 PROCEDURE — 94761 N-INVAS EAR/PLS OXIMETRY MLT: CPT

## 2025-01-06 PROCEDURE — 83735 ASSAY OF MAGNESIUM: CPT | Performed by: OBSTETRICS & GYNECOLOGY

## 2025-01-06 PROCEDURE — 11000004 HC SNF PRIVATE

## 2025-01-06 PROCEDURE — 97535 SELF CARE MNGMENT TRAINING: CPT

## 2025-01-06 PROCEDURE — 36415 COLL VENOUS BLD VENIPUNCTURE: CPT | Performed by: OBSTETRICS & GYNECOLOGY

## 2025-01-06 PROCEDURE — 97116 GAIT TRAINING THERAPY: CPT | Mod: CQ

## 2025-01-06 PROCEDURE — 97110 THERAPEUTIC EXERCISES: CPT | Mod: CQ

## 2025-01-06 PROCEDURE — 99308 SBSQ NF CARE LOW MDM 20: CPT | Mod: ,,, | Performed by: FAMILY MEDICINE

## 2025-01-06 PROCEDURE — 97110 THERAPEUTIC EXERCISES: CPT

## 2025-01-06 RX ADMIN — APIXABAN 2.5 MG: 2.5 TABLET, FILM COATED ORAL at 08:01

## 2025-01-06 RX ADMIN — Medication 6 MG: at 08:01

## 2025-01-06 RX ADMIN — ATORVASTATIN CALCIUM 20 MG: 20 TABLET, FILM COATED ORAL at 08:01

## 2025-01-06 RX ADMIN — HYDROCORTISONE: 25 CREAM TOPICAL at 08:01

## 2025-01-06 RX ADMIN — ASPIRIN 81 MG CHEWABLE TABLET 81 MG: 81 TABLET CHEWABLE at 08:01

## 2025-01-06 RX ADMIN — THERA TABS 1 TABLET: TAB at 08:01

## 2025-01-06 RX ADMIN — DOXYCYCLINE HYCLATE 100 MG: 100 TABLET, COATED ORAL at 08:01

## 2025-01-06 RX ADMIN — DONEPEZIL HYDROCHLORIDE 5 MG: 5 TABLET ORAL at 08:01

## 2025-01-06 NOTE — PROGRESS NOTES
[facility] - Medical Surgical Unit  Hospital Medicine  Progress Note    Patient Name: Vy Hussein  MRN: [id number]  Patient Class: IP- Swing   Admission Date: 12/24/2024  Length of Stay: 13 days  Attending Physician: Elizabeth Castro MD  Primary Care Provider: Flora Brennan MD        Subjective     Principal Problem:Muscular weakness        HPI:  No notes on file    Overview/Hospital Course:  12/27/24 - up in chair. No complaints voiced.    12/30/24 - doing well today. No complaints voiced.    1/3/25 - has a cough this AM. Has cough syrup ordered. Will continue present treatment.    1/6/25 - doing well. To have staples removed today.    Interval History: to have staples removed today.    Review of Systems  Objective:     Vital Signs (Most Recent):  Temp: 97.5 °F (36.4 °C) (01/06/25 0757)  Pulse: 91 (01/06/25 0757)  Resp: 18 (01/06/25 0757)  BP: 112/73 (01/06/25 0757)  SpO2: 97 % (01/06/25 0757) Vital Signs (24h Range):  Temp:  [97.5 °F (36.4 °C)-97.8 °F (36.6 °C)] 97.5 °F (36.4 °C)  Pulse:  [80-92] 91  Resp:  [16-18] 18  SpO2:  [95 %-97 %] 97 %  BP: (112-132)/(72-73) 112/73     Weight: 39.7 kg (87 lb 9.6 oz)  Body mass index is 14.14 kg/m².    Intake/Output Summary (Last 24 hours) at 1/6/2025 0842  Last data filed at 1/6/2025 0833  Gross per 24 hour   Intake 240 ml   Output --   Net 240 ml         Physical Exam        Significant Labs: All pertinent labs within the past 24 hours have been reviewed.    Significant Imaging: I have reviewed all pertinent imaging results/findings within the past 24 hours.    Assessment and Plan     No notes have been filed under this hospital service.  Service: Hospital Medicine    VTE Risk Mitigation (From admission, onward)           Ordered     apixaban tablet 2.5 mg  2 times daily         12/25/24 1056     IP VTE HIGH RISK PATIENT  Once         12/24/24 1544                    Discharge Planning   REESE: 1/13/2025     Code Status: Full Code   Medical Readiness  for Discharge Date:   Discharge Plan A: Home, Home Health                Please place Justification for DME        Ema Chamorro MD  Department of Hospital Medicine   [facility] - Medical Surgical Unit

## 2025-01-06 NOTE — PT/OT/SLP PROGRESS
Physical Therapy Treatment    Patient Name:  Vy Hussein   MRN:  [id number]    Recommendations:     Discharge Recommendations: Moderate Intensity Therapy  Discharge Equipment Recommendations: 3-in-1 commode, walker, rolling  Barriers to discharge: None    Assessment:     Vy Hussein is a 84 y.o. female admitted with a medical diagnosis of Muscular weakness.  She presents with the following impairments/functional limitations: impaired endurance, impaired self care skills, impaired functional mobility, gait instability, impaired balance, decreased upper extremity function, decreased lower extremity function, decreased safety awareness, pain, orthopedic precautions, weakness . Patient provided verbal and tactile cueing to increase movement through available ROM with exercises.  Increased resistance of Therapeutic exercises as tolerated by patient.  No adverse effects noted or reported.     Rehab Prognosis: Good; patient would benefit from acute skilled PT services to address these deficits and reach maximum level of function.    Recent Surgery: * No surgery found *      Plan:     During this hospitalization, patient to be seen 5 x/week to address the identified rehab impairments via gait training, therapeutic exercises and progress toward the following goals:    Plan of Care Expires:  01/14/25    Subjective     Chief Complaint: none  Patient/Family Comments/goals: improve safety and independence with mobility for safe return home.   Pain/Comfort:  Pain Rating 1: 0/10      Objective:     Communicated with patient prior to session.  Patient denied pain and was agreeable to treatment session.     General Precautions: Standard, fall  Orthopedic Precautions: RLE weight bearing as tolerated  Braces: N/A  Respiratory Status: Room air     Functional Mobility:  Transfers:     Sit to Stand:  SBA with rolling walker  Gait: 150' ft with rolling walker with CGA and verbal cue to look ahead when walking      AM-PAC 6 CLICK  "MOBILITY  Turning over in bed (including adjusting bedclothes, sheets and blankets)?: 3  Sitting down on and standing up from a chair with arms (e.g., wheelchair, bedside commode, etc.): 3  Moving from lying on back to sitting on the side of the bed?: 2  Moving to and from a bed to a chair (including a wheelchair)?: 3  Need to walk in hospital room?: 3  Climbing 3-5 steps with a railing?: 1  Basic Mobility Total Score: 15       Treatment & Education:  Therapeutic Exercise  Reps        LAQs  2 x 10 each, 1.5#    Hamstring Curls     Hip ADD  3 x 10 3"    Short Arc Quads'  3 x 10, 1.5#    Ankle Pumps     Quad Sets     Glute Sets  3 x 10 3"    Seated hip rotation  2 x 10, 1.5#    Seated Marching  2 x 10, 1.5#    Hip abduction  3 x 10, yellow        Therapeutic Activities  Reps (not completed)        Sit <>stand  3 x    Right LE hip and knee flexion  2 x 10    Heel Raises  2 x 10    Right LE hip extension  2 x 10    Right LE hip abduction  2 x 10      Patient left up in chair with call button in reach..    GOALS:   Multidisciplinary Problems       Physical Therapy Goals          Problem: Physical Therapy    Goal Priority Disciplines Outcome Interventions   Physical Therapy Goal     PT, PT/OT     Description: Short term goals  1. Pt will require min assist for bed mobility  2. Pt will require min assist for sit to stand transfer   3. Pt will ambulate 25 feet with RW and CGA    Long term goals  1. Pt will be independent with all bed mobility  2. Pt will be independent/mod independent with sit to stand transfer  3. Pt will perform 5 sit to stands with only 1 UE for assistance  4. Pt will ambulate 50 feet with RW and CGA  5. Pt will be independent with ascending and descending 2 standard height steps with handrail                       Time Tracking:     PT Received On: 01/06/25  PT Start Time: 1445     PT Stop Time: 1524  PT Total Time (min): 39 min     Billable Minutes: Therapeutic Exercise  24 and Gait 15    Treatment " Type: Treatment  PT/PTA: PTA     Number of PTA visits since last PT visit: 3     Continue Plan of Care per PT order to progress patient toward rehab goals as tolerated by patient.   PERLA Rosenthal   01/06/2025

## 2025-01-06 NOTE — PT/OT/SLP PROGRESS
Occupational Therapy   Treatment    Name: Vy Hussein  MRN: [id number]  Admitting Diagnosis:  Muscular weakness       Recommendations:     Discharge Recommendations: Moderate Intensity Therapy  Discharge Equipment Recommendations:  3-in-1 commode, walker, rolling  Barriers to discharge:       Assessment:     Vy Hussein is a 84 y.o. female with a medical diagnosis of Muscular weakness. Performance deficits affecting function are weakness, impaired endurance, impaired self care skills, impaired functional mobility, impaired balance, decreased lower extremity function, decreased safety awareness.     Rehab Prognosis:  Good; patient would benefit from acute skilled OT services to address these deficits and reach maximum level of function.       Plan:     Patient to be seen 5 x/week to address the above listed problems via therapeutic activities, therapeutic exercises, self-care/home management  Plan of Care Expires:    Plan of Care Reviewed with: patient    Subjective     Chief Complaint: Decreased mobility  Patient/Family Comments/goals: increased strength  Pain/Comfort:  Pain Rating 1: 0/10    Objective:     Communicated with: RN prior to session.  Patient found up in chair with   upon OT entry to room.    General Precautions: Standard, fall    Orthopedic Precautions:   Braces:    Respiratory Status: Room air     Occupational Performance:       Functional Mobility/Transfers:  Patient completed Sit <> Stand Transfer with minimum assistance  with  rolling walker   Functional Mobility: min a with ROLLING WALKER and extra time needed    Activities of Daily Living:  Feeding S/u   Grooming S/u   Bathing Min a   UB dsg Min a   LB dsg Mod a   Toileting S/u   Toilet t/f Min a     Patient was min a with ambulating to bathroom and s/u with toileting.      [facility] 6 Click ADL: 16    Treatment & Education:  Pt educated on OT role/POC.   Importance of OOB activity with staff assistance.  Importance of sitting up in the  chair throughout the day as tolerated, especially for meals   Safety during functional t/f and mobility  Importance of assisting with self-care activities     Patient completed the following for increased strength to increase I with ADLs: 3# dowel- shoulder press, chest press, bicep curls x 40, yellow theraflex x 40 both ways, yellow theraband- scapular retraction x 40      Patient left up in chair with call button in reach and chair alarm on    GOALS:   Multidisciplinary Problems       Occupational Therapy Goals          Problem: Occupational Therapy    Goal Priority Disciplines Outcome Interventions   Occupational Therapy Goal     OT, PT/OT Progressing    Description: STG:  Pt will perform grooming with setup  Pt will bathe with Mod I  Pt will perform UE dressing with I after set up  Pt will perform LE dressing with Mod I  Pt will sit EOB x 15 min with no assistance  Pt will transfer bed/chair/bsc with Mod I  Pt will perform standing task x 15 min with supervision assistance  Pt will tolerate 45 minutes of tx without fatigue      LT.Restore to max I with self care and mobility.                         Time Tracking:     OT Date of Treatment: 25  OT Start Time: 1318  OT Stop Time: 1355  OT Total Time (min): 37 min    Billable Minutes:Self Care/Home Management 12 min  Therapeutic Exercise 25 min  Lizeth Negron, JOHN PAULR/L            Number of PHILIP visits since last OT visit: 2025

## 2025-01-06 NOTE — NURSING
"Pt gown noted to be damp.  This nurse and CNA requested for pt to let us change gown.  Pt stated, " I have night sweats. Just cover me up." Refused gown change at this time.   "

## 2025-01-06 NOTE — NURSING
14 staples removed per dr order. No drainage or redness noted. Steristrips applied. Pt tolerated well.

## 2025-01-06 NOTE — PT/OT/SLP PROGRESS
"Speech Language Pathology Treatment    Patient Name:  Vy Hussein   MRN:  [id number]  Admitting Diagnosis: Muscular weakness    Recommendations:                 General Recommendations:  Speech/language therapy  Diet recommendations:   ,     Aspiration Precautions:       General Precautions: Standard,    Communication strategies:  provide increased time to answer and go to room if call light pushed    Assessment:     Vy Hussein is a 84 y.o. female with an SLP diagnosis of Aphasia.  She presents with expressive aphasia.    Subjective       Patient goals: Patient will be Independently oriented x4, 5/5 therapy sessions.   Patient answered "wh" questions with 70% accuracy Independently.   Patient completed word finding tasks with 70% accuracy Independently.   Patient completed divergent naming tasks with 70% accuracy Independently.   Patient completed convergent naming tasks with 70% accuracy Independently.   Patient completed problem solving tasks with 70% accuracy Independently.   Patient completed verbal reasoning tasks with 70% accuracy Independently.   Patient completed short term memory tasks with 75% accuracy Independently.      When pt uses slow speech; pt is intelligible.    Pain/Comfort:  Pain Rating 1: 0/10    Respiratory Status: Room air    Objective:     Has the patient been evaluated by SLP for swallowing?   No  Keep patient NPO? No   Current Respiratory Status:            Goals:   Multidisciplinary Problems       SLP Goals       Not on file                    Plan:     Patient to be seen:  3 x/week   Plan of Care expires:  01/13/25  Plan of Care reviewed with:  patient   SLP Follow-Up:  Yes       Discharge recommendations:  Moderate Intensity Therapy   Barriers to Discharge:  Safety Awareness      Time Tracking:     SLP Treatment Date:   01/06/25  Speech Start Time:  1400  Speech Stop Time:  1450     Speech Total Time (min):  50 min    Billable Minutes: Speech Therapy Individual  "     01/06/2025    Jenni Corado MSCCC-SLP

## 2025-01-06 NOTE — PLAN OF CARE
Problem: Adult Inpatient Plan of Care  Goal: Patient-Specific Goal (Individualized)  Outcome: Progressing  Goal: Absence of Hospital-Acquired Illness or Injury  Outcome: Progressing  Intervention: Identify and Manage Fall Risk  Flowsheets (Taken 1/6/2025 1359)  Safety Promotion/Fall Prevention:   assistive device/personal item within reach   chair alarm set   Fall Risk reviewed with patient/family   room near unit station   instructed to call staff for mobility   in recliner, wheels locked  Intervention: Prevent Skin Injury  Flowsheets (Taken 1/6/2025 1359)  Body Position: position changed independently  Skin Protection:   protective footwear used   incontinence pads utilized  Device Skin Pressure Protection:   absorbent pad utilized/changed   adhesive use limited   pressure points protected  Intervention: Prevent and Manage VTE (Venous Thromboembolism) Risk  Flowsheets (Taken 1/6/2025 1351)  VTE Prevention/Management:   fluids promoted   ambulation promoted  Intervention: Prevent Infection  Flowsheets (Taken 1/6/2025 1359)  Infection Prevention:   hand hygiene promoted   single patient room provided

## 2025-01-06 NOTE — SUBJECTIVE & OBJECTIVE
Interval History: to have staples removed today.    Review of Systems  Objective:     Vital Signs (Most Recent):  Temp: 97.5 °F (36.4 °C) (01/06/25 0757)  Pulse: 91 (01/06/25 0757)  Resp: 18 (01/06/25 0757)  BP: 112/73 (01/06/25 0757)  SpO2: 97 % (01/06/25 0757) Vital Signs (24h Range):  Temp:  [97.5 °F (36.4 °C)-97.8 °F (36.6 °C)] 97.5 °F (36.4 °C)  Pulse:  [80-92] 91  Resp:  [16-18] 18  SpO2:  [95 %-97 %] 97 %  BP: (112-132)/(72-73) 112/73     Weight: 39.7 kg (87 lb 9.6 oz)  Body mass index is 14.14 kg/m².    Intake/Output Summary (Last 24 hours) at 1/6/2025 0842  Last data filed at 1/6/2025 0833  Gross per 24 hour   Intake 240 ml   Output --   Net 240 ml         Physical Exam        Significant Labs: All pertinent labs within the past 24 hours have been reviewed.    Significant Imaging: I have reviewed all pertinent imaging results/findings within the past 24 hours.

## 2025-01-07 PROCEDURE — 97530 THERAPEUTIC ACTIVITIES: CPT | Mod: CQ

## 2025-01-07 PROCEDURE — 94761 N-INVAS EAR/PLS OXIMETRY MLT: CPT

## 2025-01-07 PROCEDURE — 97110 THERAPEUTIC EXERCISES: CPT

## 2025-01-07 PROCEDURE — 25000003 PHARM REV CODE 250: Performed by: OBSTETRICS & GYNECOLOGY

## 2025-01-07 PROCEDURE — 97116 GAIT TRAINING THERAPY: CPT | Mod: CQ

## 2025-01-07 PROCEDURE — 97110 THERAPEUTIC EXERCISES: CPT | Mod: CQ

## 2025-01-07 PROCEDURE — 11000004 HC SNF PRIVATE

## 2025-01-07 PROCEDURE — 92507 TX SP LANG VOICE COMM INDIV: CPT

## 2025-01-07 RX ADMIN — APIXABAN 2.5 MG: 2.5 TABLET, FILM COATED ORAL at 09:01

## 2025-01-07 RX ADMIN — APIXABAN 2.5 MG: 2.5 TABLET, FILM COATED ORAL at 08:01

## 2025-01-07 RX ADMIN — DONEPEZIL HYDROCHLORIDE 5 MG: 5 TABLET ORAL at 08:01

## 2025-01-07 RX ADMIN — HYDROCORTISONE: 25 CREAM TOPICAL at 08:01

## 2025-01-07 RX ADMIN — THERA TABS 1 TABLET: TAB at 09:01

## 2025-01-07 RX ADMIN — ASPIRIN 81 MG CHEWABLE TABLET 81 MG: 81 TABLET CHEWABLE at 09:01

## 2025-01-07 RX ADMIN — ATORVASTATIN CALCIUM 20 MG: 20 TABLET, FILM COATED ORAL at 09:01

## 2025-01-07 NOTE — PLAN OF CARE
Problem: Adult Inpatient Plan of Care  Goal: Patient-Specific Goal (Individualized)  Outcome: Progressing  Goal: Optimal Comfort and Wellbeing  Outcome: Progressing  Intervention: Monitor Pain and Promote Comfort  Flowsheets (Taken 1/7/2025 1359)  Pain Management Interventions:   care clustered   pillow support provided   position adjusted  Intervention: Provide Person-Centered Care  Flowsheets (Taken 1/7/2025 1359)  Trust Relationship/Rapport:   care explained   choices provided   emotional support provided   empathic listening provided   questions answered   reassurance provided   thoughts/feelings acknowledged     Problem: Fall Injury Risk  Goal: Absence of Fall and Fall-Related Injury  Outcome: Progressing  Intervention: Identify and Manage Contributors  Flowsheets (Taken 1/7/2025 1359)  Self-Care Promotion:   independence encouraged   BADL personal objects within reach   BADL personal routines maintained   meal set-up provided   adaptive equipment use encouraged  Medication Review/Management:   medications reviewed   high-risk medications identified     Problem: Wound  Goal: Improved Oral Intake  Outcome: Progressing  Intervention: Promote and Optimize Oral Intake  Flowsheets (Taken 1/7/2025 1359)  Nutrition Interventions: meal set-up provided

## 2025-01-07 NOTE — PT/OT/SLP PROGRESS
"Speech Language Pathology Treatment    Patient Name:  Vy Hussein   MRN:  [id number]  Admitting Diagnosis: Muscular weakness    Recommendations:                 General Recommendations:  Speech/language therapy  Diet recommendations:   ,     Aspiration Precautions:       General Precautions: Standard,    Communication strategies:  provide increased time to answer and go to room if call light pushed    Assessment:     Vy Hussein is a 84 y.o. female with an SLP diagnosis of Aphasia.  She presents with expressive aphasia.    Subjective       Patient goals: Patient will be Independently oriented x4, 5/5 therapy sessions.   Patient answered "wh" questions with 70% accuracy Independently.   Patient completed word finding tasks with 70% accuracy Independently.   Patient completed divergent naming tasks with 70% accuracy Independently.   Patient completed convergent naming tasks with 70% accuracy Independently.   Patient completed problem solving tasks with 70% accuracy Independently.   Patient completed verbal reasoning tasks with 70% accuracy Independently.   Patient completed short term memory tasks with 75% accuracy Independently.      When pt uses slow speech; pt is intelligible.    Pain/Comfort:       Respiratory Status: Room air    Objective:     Has the patient been evaluated by SLP for swallowing?   No  Keep patient NPO? No   Current Respiratory Status:            Goals:   Multidisciplinary Problems       SLP Goals       Not on file                    Plan:     Patient to be seen:  3 x/week   Plan of Care expires:  01/13/25  Plan of Care reviewed with:  patient   SLP Follow-Up:  Yes       Discharge recommendations:  Moderate Intensity Therapy   Barriers to Discharge:  Safety Awareness      Time Tracking:     SLP Treatment Date:   01/07/25  Speech Start Time:  1615  Speech Stop Time:  1715     Speech Total Time (min):  60 min    Billable Minutes: Speech Therapy Individual      01/07/2025    Jenni Corado" MSCCC-SLP

## 2025-01-07 NOTE — PLAN OF CARE
Problem: Adult Inpatient Plan of Care  Goal: Plan of Care Review  Outcome: Progressing  Goal: Patient-Specific Goal (Individualized)  Outcome: Progressing  Goal: Absence of Hospital-Acquired Illness or Injury  Outcome: Progressing     Problem: Skin Injury Risk Increased  Goal: Skin Health and Integrity  Outcome: Progressing     Problem: Fall Injury Risk  Goal: Absence of Fall and Fall-Related Injury  Outcome: Progressing  Intervention: Identify and Manage Contributors  Flowsheets (Taken 1/6/2025 2150)  Self-Care Promotion:   independence encouraged   BADL personal objects within reach   BADL personal routines maintained   adaptive equipment use encouraged  Medication Review/Management: medications reviewed  Intervention: Promote Injury-Free Environment  Flowsheets (Taken 1/6/2025 2150)  Safety Promotion/Fall Prevention:   assistive device/personal item within reach   bed alarm set   diversional activities provided   instructed to call staff for mobility   lighting adjusted   nonskid shoes/socks when out of bed   medications reviewed   muscle strengthening facilitated   side rails raised x 3     Problem: Pain Acute  Goal: Optimal Pain Control and Function  Outcome: Progressing

## 2025-01-07 NOTE — PT/OT/SLP PROGRESS
Physical Therapy Treatment    Patient Name:  Vy Hussein   MRN:  [id number]    Recommendations:     Discharge Recommendations: Moderate Intensity Therapy  Discharge Equipment Recommendations: 3-in-1 commode, walker, rolling  Barriers to discharge: None    Assessment:     Vy Hussein is a 84 y.o. female admitted with a medical diagnosis of Muscular weakness.  She presents with the following impairments/functional limitations: weakness, impaired endurance, impaired self care skills, impaired functional mobility, gait instability, impaired balance, decreased upper extremity function, decreased lower extremity function, decreased safety awareness, pain, orthopedic precautions . Patient provided verbal and tactile cueing to increase movement through available ROM with exercises and activities.  Did not increase distance during gait training due to patient's reports of feeling increased discomfort in Right hip.     Rehab Prognosis: Good; patient would benefit from acute skilled PT services to address these deficits and reach maximum level of function.    Recent Surgery: * No surgery found *      Plan:     During this hospitalization, patient to be seen 5 x/week to address the identified rehab impairments via gait training, therapeutic activities, therapeutic exercises and progress toward the following goals:    Plan of Care Expires:  01/14/25    Subjective     Chief Complaint: none  Patient/Family Comments/goals: improve safety and independence with mobility for safe return home.   Pain/Comfort:  Pain Rating 1: 0/10      Objective:     Communicated with patient prior to session.  Patient denied pain and was agreeable to treatment session.     General Precautions: Standard, fall  Orthopedic Precautions: RLE weight bearing as tolerated  Braces: N/A  Respiratory Status: Room air     Functional Mobility:  Transfers:     Sit to Stand:  SBA with rolling walker  Gait: 150' ft with rolling walker with CGA and verbal cue  "to look ahead when walking      AM-PAC 6 CLICK MOBILITY  Turning over in bed (including adjusting bedclothes, sheets and blankets)?: 3  Sitting down on and standing up from a chair with arms (e.g., wheelchair, bedside commode, etc.): 3  Moving from lying on back to sitting on the side of the bed?: 2  Moving to and from a bed to a chair (including a wheelchair)?: 3  Need to walk in hospital room?: 3  Climbing 3-5 steps with a railing?: 3  Basic Mobility Total Score: 17       Treatment & Education:  Therapeutic Exercise  Reps        LAQs  2 x 10 each, 1.5#    Hamstring Curls     Hip ADD  3 x 10 3"    Short Arc Quads'  3 x 10, 1.5#    Ankle Pumps     Quad Sets     Glute Sets  3 x 10 3"    Seated hip rotation  2 x 10, 1.5#    Seated Marching  2 x 10, 1.5#    Hip abduction  3 x 10, yellow        Therapeutic Activities  Reps        Sit <>stand  3 x    Right LE hip and knee flexion  2 x 10    Heel Raises  2 x 10    Right LE hip extension  2 x 10    Right LE hip abduction  2 x 10      Patient left up in chair with call button in reach..    GOALS:   Multidisciplinary Problems       Physical Therapy Goals          Problem: Physical Therapy    Goal Priority Disciplines Outcome Interventions   Physical Therapy Goal     PT, PT/OT     Description: Short term goals  1. Pt will require min assist for bed mobility  2. Pt will require min assist for sit to stand transfer   3. Pt will ambulate 25 feet with RW and CGA    Long term goals  1. Pt will be independent with all bed mobility  2. Pt will be independent/mod independent with sit to stand transfer  3. Pt will perform 5 sit to stands with only 1 UE for assistance  4. Pt will ambulate 50 feet with RW and CGA  5. Pt will be independent with ascending and descending 2 standard height steps with handrail                       Time Tracking:     PT Received On: 01/07/25  PT Start Time: 1330     PT Stop Time: 1412  PT Total Time (min): 42 min     Billable Minutes: Gait 10, Therapeutic " activity 12, Therapeutic exercises 20    Treatment Type: Treatment  PT/PTA: PTA     Number of PTA visits since last PT visit: 4     Continue Plan of Care per PT order to progress patient toward rehab goals as tolerated by patient.   PERLA Rosenthal   01/07/2025

## 2025-01-07 NOTE — PT/OT/SLP PROGRESS
Occupational Therapy   Treatment    Name: Vy Hussein  MRN: [id number]  Admitting Diagnosis:  Muscular weakness       Recommendations:     Discharge Recommendations: Moderate Intensity Therapy  Discharge Equipment Recommendations:  3-in-1 commode, walker, rolling  Barriers to discharge:       Assessment:     Vy Hussein is a 84 y.o. female with a medical diagnosis of Muscular weakness. Performance deficits affecting function are weakness, impaired endurance, impaired self care skills, impaired functional mobility, impaired balance, decreased lower extremity function, decreased safety awareness.     Rehab Prognosis:  Good; patient would benefit from acute skilled OT services to address these deficits and reach maximum level of function.       Plan:     Patient to be seen 5 x/week to address the above listed problems via self-care/home management, therapeutic exercises, therapeutic activities  Plan of Care Expires:    Plan of Care Reviewed with: patient    Subjective     Chief Complaint: Decreased mobility  Patient/Family Comments/goals: increased strength  Pain/Comfort:  Pain Rating 1: 0/10    Objective:     Communicated with: RN prior to session.  Patient found up in chair with   upon OT entry to room.    General Precautions: Standard, fall    Orthopedic Precautions:   Braces:    Respiratory Status: Room air     Occupational Performance:       Functional Mobility/Transfers:  Patient completed Sit <> Stand Transfer with minimum assistance  with  rolling walker   Functional Mobility: min a with ROLLING WALKER and extra time needed    Activities of Daily Living:  Feeding S/u   Grooming S/u   Bathing Min a   UB dsg Min a   LB dsg Mod a   Toileting S/u   Toilet t/f Min a         AMPAC 6 Click ADL: 16    Treatment & Education:  Pt educated on OT role/POC.   Importance of OOB activity with staff assistance.  Importance of sitting up in the chair throughout the day as tolerated, especially for meals   Safety  during functional t/f and mobility  Importance of assisting with self-care activities     Patient completed the following for increased strength to increase I with ADLs: 3# dowel- shoulder press, chest press, bicep curls x 40, yellow theraflex x 40 both ways, yellow theraband- scapular retraction x 40; UBE 8 min      Patient left up in chair with call button in reach and chair alarm on    GOALS:   Multidisciplinary Problems       Occupational Therapy Goals          Problem: Occupational Therapy    Goal Priority Disciplines Outcome Interventions   Occupational Therapy Goal     OT, PT/OT Progressing    Description: STG:  Pt will perform grooming with setup  Pt will bathe with Mod I  Pt will perform UE dressing with I after set up  Pt will perform LE dressing with Mod I  Pt will sit EOB x 15 min with no assistance  Pt will transfer bed/chair/bsc with Mod I  Pt will perform standing task x 15 min with supervision assistance  Pt will tolerate 45 minutes of tx without fatigue      LT.Restore to max I with self care and mobility.                         Time Tracking:     OT Date of Treatment: 25  OT Start Time: 1419  OT Stop Time: 1458  OT Total Time (min): 39 min    Billable Minutes:Therapeutic Exercise 39 min  Lizeth Negron OTR/L            Number of PHILIP visits since last OT visit: 2025

## 2025-01-08 PROCEDURE — 97116 GAIT TRAINING THERAPY: CPT | Mod: CQ

## 2025-01-08 PROCEDURE — 97110 THERAPEUTIC EXERCISES: CPT

## 2025-01-08 PROCEDURE — 25000003 PHARM REV CODE 250: Performed by: OBSTETRICS & GYNECOLOGY

## 2025-01-08 PROCEDURE — 97110 THERAPEUTIC EXERCISES: CPT | Mod: CQ

## 2025-01-08 PROCEDURE — 94761 N-INVAS EAR/PLS OXIMETRY MLT: CPT

## 2025-01-08 PROCEDURE — 11000004 HC SNF PRIVATE

## 2025-01-08 RX ADMIN — ASPIRIN 81 MG CHEWABLE TABLET 81 MG: 81 TABLET CHEWABLE at 02:01

## 2025-01-08 RX ADMIN — SENNOSIDES AND DOCUSATE SODIUM 1 TABLET: 50; 8.6 TABLET ORAL at 02:01

## 2025-01-08 RX ADMIN — HYDROCORTISONE: 25 CREAM TOPICAL at 09:01

## 2025-01-08 RX ADMIN — APIXABAN 2.5 MG: 2.5 TABLET, FILM COATED ORAL at 02:01

## 2025-01-08 RX ADMIN — DONEPEZIL HYDROCHLORIDE 5 MG: 5 TABLET ORAL at 09:01

## 2025-01-08 RX ADMIN — THERA TABS 1 TABLET: TAB at 02:01

## 2025-01-08 RX ADMIN — HYDROCORTISONE: 25 CREAM TOPICAL at 02:01

## 2025-01-08 RX ADMIN — POLYETHYLENE GLYCOL 3350 17 G: 17 POWDER, FOR SOLUTION ORAL at 02:01

## 2025-01-08 RX ADMIN — ATORVASTATIN CALCIUM 20 MG: 20 TABLET, FILM COATED ORAL at 02:01

## 2025-01-08 RX ADMIN — APIXABAN 2.5 MG: 2.5 TABLET, FILM COATED ORAL at 09:01

## 2025-01-08 RX ADMIN — SENNOSIDES AND DOCUSATE SODIUM 1 TABLET: 50; 8.6 TABLET ORAL at 09:01

## 2025-01-08 NOTE — PLAN OF CARE
Problem: Adult Inpatient Plan of Care  Goal: Plan of Care Review  1/8/2025 1649 by Jenni Butler RN  Outcome: Progressing  1/8/2025 1649 by Jenni Butler RN  Outcome: Progressing  Goal: Patient-Specific Goal (Individualized)  1/8/2025 1649 by Jenni Butler RN  Outcome: Progressing  1/8/2025 1649 by Jenni Butler RN  Outcome: Progressing  Goal: Absence of Hospital-Acquired Illness or Injury  1/8/2025 1649 by Jenni Butler RN  Outcome: Progressing  1/8/2025 1649 by Jenni Butler RN  Outcome: Progressing  Goal: Optimal Comfort and Wellbeing  1/8/2025 1649 by Jenni Butler RN  Outcome: Progressing  1/8/2025 1649 by Jenni Butler RN  Outcome: Progressing  Goal: Readiness for Transition of Care  1/8/2025 1649 by Jenni Butler RN  Outcome: Progressing  1/8/2025 1649 by Jenni Butler RN  Outcome: Progressing     Problem: Skin Injury Risk Increased  Goal: Skin Health and Integrity  1/8/2025 1649 by Jenni Butler RN  Outcome: Progressing  1/8/2025 1649 by Jenni Butler RN  Outcome: Progressing     Problem: Fall Injury Risk  Goal: Absence of Fall and Fall-Related Injury  Outcome: Progressing     Problem: Infection  Goal: Absence of Infection Signs and Symptoms  Outcome: Progressing     Problem: Wound  Goal: Optimal Coping  Outcome: Progressing  Goal: Optimal Functional Ability  Outcome: Progressing  Goal: Absence of Infection Signs and Symptoms  Outcome: Progressing  Goal: Improved Oral Intake  Outcome: Progressing  Goal: Optimal Pain Control and Function  Outcome: Progressing  Goal: Skin Health and Integrity  Outcome: Progressing  Goal: Optimal Wound Healing  Outcome: Progressing     Problem: Communication Impairment  Goal: Effective Communication Skills  Outcome: Progressing     Problem: Pain Acute  Goal: Optimal Pain Control and Function  Outcome: Progressing

## 2025-01-08 NOTE — PT/OT/SLP PROGRESS
Occupational Therapy   Treatment    Name: Vy Hussein  MRN: [id number]  Admitting Diagnosis:  Muscular weakness       Recommendations:     Discharge Recommendations: Moderate Intensity Therapy  Discharge Equipment Recommendations:  3-in-1 commode, walker, rolling  Barriers to discharge:       Assessment:     Vy Hussein is a 84 y.o. female with a medical diagnosis of Muscular weakness. Performance deficits affecting function are weakness, impaired endurance, impaired self care skills, impaired functional mobility, impaired balance, decreased lower extremity function, decreased safety awareness.     Rehab Prognosis:  Good; patient would benefit from acute skilled OT services to address these deficits and reach maximum level of function.       Plan:     Patient to be seen 5 x/week to address the above listed problems via therapeutic exercises, therapeutic activities, self-care/home management  Plan of Care Expires:    Plan of Care Reviewed with: patient    Subjective     Chief Complaint: Decreased mobility  Patient/Family Comments/goals: increased strength  Pain/Comfort:  Pain Rating 1: 0/10    Objective:     Communicated with: RN prior to session.  Patient found up in chair with   upon OT entry to room.    General Precautions: Standard, fall    Orthopedic Precautions:   Braces:    Respiratory Status: Room air     Occupational Performance:       Functional Mobility/Transfers:  Patient completed Sit <> Stand Transfer with minimum assistance  with  rolling walker   Functional Mobility: min a with ROLLING WALKER and extra time needed    Activities of Daily Living:  Feeding S/u   Grooming S/u   Bathing Min a   UB dsg Min a   LB dsg Mod a   Toileting S/u   Toilet t/f Min a         AMPAC 6 Click ADL: 16    Treatment & Education:  Pt educated on OT role/POC.   Importance of OOB activity with staff assistance.  Importance of sitting up in the chair throughout the day as tolerated, especially for meals   Safety  during functional t/f and mobility  Importance of assisting with self-care activities     Patient completed the following for increased strength to increase I with ADLs: 3# dowel- shoulder press, chest press, bicep curls x 40, yellow theraflex x 40 both ways, yellow theraband- scapular retraction x 40;      Patient left up in chair with call button in reach and chair alarm on    GOALS:   Multidisciplinary Problems       Occupational Therapy Goals          Problem: Occupational Therapy    Goal Priority Disciplines Outcome Interventions   Occupational Therapy Goal     OT, PT/OT Progressing    Description: STG:  Pt will perform grooming with setup  Pt will bathe with Mod I  Pt will perform UE dressing with I after set up  Pt will perform LE dressing with Mod I  Pt will sit EOB x 15 min with no assistance  Pt will transfer bed/chair/bsc with Mod I  Pt will perform standing task x 15 min with supervision assistance  Pt will tolerate 45 minutes of tx without fatigue      LT.Restore to max I with self care and mobility.                         Time Tracking:     OT Date of Treatment: 25  OT Start Time: 1445  OT Stop Time: 1514  OT Total Time (min): 29 min    Billable Minutes:Therapeutic Exercise 29 min  Lizeth Negron, OTR/L            Number of PHILIP visits since last OT visit: 2025

## 2025-01-08 NOTE — PLAN OF CARE
Problem: Adult Inpatient Plan of Care  Goal: Plan of Care Review  Outcome: Progressing  Goal: Patient-Specific Goal (Individualized)  Outcome: Progressing  Goal: Absence of Hospital-Acquired Illness or Injury  Outcome: Progressing  Goal: Optimal Comfort and Wellbeing  Outcome: Progressing     Problem: Skin Injury Risk Increased  Goal: Skin Health and Integrity  Outcome: Progressing     Problem: Fall Injury Risk  Goal: Absence of Fall and Fall-Related Injury  Outcome: Progressing  Intervention: Identify and Manage Contributors  Flowsheets (Taken 1/7/2025 2025)  Self-Care Promotion:   independence encouraged   BADL personal objects within reach   BADL personal routines maintained   adaptive equipment use encouraged  Medication Review/Management: medications reviewed  Intervention: Promote Injury-Free Environment  Flowsheets (Taken 1/7/2025 2025)  Safety Promotion/Fall Prevention:   assistive device/personal item within reach   diversional activities provided   chair alarm set   in recliner, wheels locked   instructed to call staff for mobility   lighting adjusted   medications reviewed   muscle strengthening facilitated   nonskid shoes/socks when out of bed

## 2025-01-08 NOTE — PT/OT/SLP PROGRESS
Physical Therapy Treatment    Patient Name:  Vy Hussein   MRN:  [id number]    Recommendations:     Discharge Recommendations: Moderate Intensity Therapy  Discharge Equipment Recommendations: 3-in-1 commode, walker, rolling  Barriers to discharge: None    Assessment:     Vy Hussein is a 84 y.o. female admitted with a medical diagnosis of Muscular weakness.  She presents with the following impairments/functional limitations: weakness, impaired endurance, impaired self care skills, impaired functional mobility, gait instability, decreased upper extremity function, decreased lower extremity function, decreased safety awareness, pain, orthopedic precautions . Patient reports she is tired today due to attending doctor's appointment of of facility.  Therapeutic exercises kept to long sitting, but patient is able to ambulate all the way from rehab gym to her hospital room with RW.     Rehab Prognosis: Good; patient would benefit from acute skilled PT services to address these deficits and reach maximum level of function.    Recent Surgery: * No surgery found *      Plan:     During this hospitalization, patient to be seen 5 x/week to address the identified rehab impairments via gait training, therapeutic exercises and progress toward the following goals:    Plan of Care Expires:  01/14/25    Subjective     Chief Complaint: none  Patient/Family Comments/goals: improve safety and independence with mobility for safe return home.   Pain/Comfort:  Pain Rating 1: 0/10      Objective:     Communicated with patient prior to session.  Patient denied pain and was agreeable to treatment session.     General Precautions: Standard, fall  Orthopedic Precautions: RLE weight bearing as tolerated  Braces: N/A  Respiratory Status: Room air     Functional Mobility:  Transfers:     Sit to Stand:  SBA with rolling walker  Gait: 130' ft with rolling walker with CGA and verbal cue to look ahead when walking      AM-PAC 6 CLICK  "MOBILITY  Turning over in bed (including adjusting bedclothes, sheets and blankets)?: 3  Sitting down on and standing up from a chair with arms (e.g., wheelchair, bedside commode, etc.): 3  Moving from lying on back to sitting on the side of the bed?: 2  Moving to and from a bed to a chair (including a wheelchair)?: 3  Need to walk in hospital room?: 3  Climbing 3-5 steps with a railing?: 3  Basic Mobility Total Score: 17       Treatment & Education:  Therapeutic Exercise  Reps        LAQs     Hamstring Curls     Hip ADD  3 x 10 3"    Short Arc Quads'  3 x 10,    Ankle Pumps  30 x    Quad Sets  3 x 10    Glute Sets  3 x 10 3"    Seated hip rotation     Seated Marching     Hip abduction         Therapeutic Activities  Reps (not  completed)        Sit <>stand  3 x    Right LE hip and knee flexion  2 x 10    Heel Raises  2 x 10    Right LE hip extension  2 x 10    Right LE hip abduction  2 x 10      Patient left up in chair with call button in reach..    GOALS:   Multidisciplinary Problems       Physical Therapy Goals          Problem: Physical Therapy    Goal Priority Disciplines Outcome Interventions   Physical Therapy Goal     PT, PT/OT     Description: Short term goals  1. Pt will require min assist for bed mobility  2. Pt will require min assist for sit to stand transfer   3. Pt will ambulate 25 feet with RW and CGA    Long term goals  1. Pt will be independent with all bed mobility  2. Pt will be independent/mod independent with sit to stand transfer  3. Pt will perform 5 sit to stands with only 1 UE for assistance  4. Pt will ambulate 50 feet with RW and CGA  5. Pt will be independent with ascending and descending 2 standard height steps with handrail                       Time Tracking:     PT Received On: 01/08/25  PT Start Time: 1516     PT Stop Time: 1550  PT Total Time (min): 34 min     Billable Minutes: Gait 10, Therapeutic exercise 24    Treatment Type: Treatment  PT/PTA: PTA     Number of PTA visits " since last PT visit: 5     Continue Plan of Care per PT order to progress patient toward rehab goals as tolerated by patient.   PERLA Rosenthal   01/08/2025

## 2025-01-08 NOTE — NURSING
Pt is leaving at this time to go to doctors appt, being transferred via wc by staff, pt has belongings and is stable, awake and alert. She is going with great nephew via pov. NAM.

## 2025-01-08 NOTE — NURSING
Pt returned from Ortho follow up appointment. Family and Patient tell Nurse Her orthopedist is pleased with her progress and he will see her next month.

## 2025-01-09 LAB
ANION GAP SERPL CALCULATED.3IONS-SCNC: 11 MMOL/L (ref 7–16)
BASOPHILS # BLD AUTO: 0.08 K/UL (ref 0–0.2)
BASOPHILS NFR BLD AUTO: 1 % (ref 0–1)
BUN SERPL-MCNC: 28 MG/DL (ref 10–20)
BUN/CREAT SERPL: 38 (ref 6–20)
CALCIUM SERPL-MCNC: 9.2 MG/DL (ref 8.4–10.2)
CHLORIDE SERPL-SCNC: 108 MMOL/L (ref 98–107)
CO2 SERPL-SCNC: 26 MMOL/L (ref 23–31)
CREAT SERPL-MCNC: 0.73 MG/DL (ref 0.55–1.02)
DIFFERENTIAL METHOD BLD: ABNORMAL
EGFR (NO RACE VARIABLE) (RUSH/TITUS): 81 ML/MIN/1.73M2
EOSINOPHIL # BLD AUTO: 0.21 K/UL (ref 0–0.5)
EOSINOPHIL NFR BLD AUTO: 2.7 % (ref 1–4)
ERYTHROCYTE [DISTWIDTH] IN BLOOD BY AUTOMATED COUNT: 16.2 % (ref 11.5–14.5)
GLUCOSE SERPL-MCNC: 89 MG/DL (ref 82–115)
HCT VFR BLD AUTO: 30.5 % (ref 38–47)
HGB BLD-MCNC: 10 G/DL (ref 12–16)
IMM GRANULOCYTES # BLD AUTO: 0.03 K/UL (ref 0–0.04)
IMM GRANULOCYTES NFR BLD: 0.4 % (ref 0–0.4)
INFLUENZA A MOLECULAR (OHS): NEGATIVE
INFLUENZA B MOLECULAR (OHS): NEGATIVE
LYMPHOCYTES # BLD AUTO: 1.45 K/UL (ref 1–4.8)
LYMPHOCYTES NFR BLD AUTO: 18.6 % (ref 27–41)
MAGNESIUM SERPL-MCNC: 2.1 MG/DL (ref 1.6–2.6)
MCH RBC QN AUTO: 29.8 PG (ref 27–31)
MCHC RBC AUTO-ENTMCNC: 32.8 G/DL (ref 32–36)
MCV RBC AUTO: 90.8 FL (ref 80–96)
MONOCYTES # BLD AUTO: 0.5 K/UL (ref 0–0.8)
MONOCYTES NFR BLD AUTO: 6.4 % (ref 2–6)
MPC BLD CALC-MCNC: 9.8 FL (ref 9.4–12.4)
NEUTROPHILS # BLD AUTO: 5.53 K/UL (ref 1.8–7.7)
NEUTROPHILS NFR BLD AUTO: 70.9 % (ref 53–65)
NRBC # BLD AUTO: 0 X10E3/UL
NRBC, AUTO (.00): 0 %
PHOSPHATE SERPL-MCNC: 3.4 MG/DL (ref 2.3–4.7)
PLATELET # BLD AUTO: 289 K/UL (ref 150–400)
POTASSIUM SERPL-SCNC: 3.4 MMOL/L (ref 3.5–5.1)
RBC # BLD AUTO: 3.36 M/UL (ref 4.2–5.4)
SARS-COV-2 RDRP RESP QL NAA+PROBE: NEGATIVE
SODIUM SERPL-SCNC: 142 MMOL/L (ref 136–145)
WBC # BLD AUTO: 7.8 K/UL (ref 4.5–11)

## 2025-01-09 PROCEDURE — 94761 N-INVAS EAR/PLS OXIMETRY MLT: CPT

## 2025-01-09 PROCEDURE — 99900035 HC TECH TIME PER 15 MIN (STAT)

## 2025-01-09 PROCEDURE — 83735 ASSAY OF MAGNESIUM: CPT | Performed by: OBSTETRICS & GYNECOLOGY

## 2025-01-09 PROCEDURE — 87502 INFLUENZA DNA AMP PROBE: CPT | Performed by: INTERNAL MEDICINE

## 2025-01-09 PROCEDURE — 92507 TX SP LANG VOICE COMM INDIV: CPT

## 2025-01-09 PROCEDURE — 97110 THERAPEUTIC EXERCISES: CPT

## 2025-01-09 PROCEDURE — 11000004 HC SNF PRIVATE

## 2025-01-09 PROCEDURE — 84100 ASSAY OF PHOSPHORUS: CPT | Performed by: OBSTETRICS & GYNECOLOGY

## 2025-01-09 PROCEDURE — 87635 SARS-COV-2 COVID-19 AMP PRB: CPT | Performed by: INTERNAL MEDICINE

## 2025-01-09 PROCEDURE — 25000003 PHARM REV CODE 250: Performed by: OBSTETRICS & GYNECOLOGY

## 2025-01-09 PROCEDURE — 97530 THERAPEUTIC ACTIVITIES: CPT

## 2025-01-09 PROCEDURE — 85025 COMPLETE CBC W/AUTO DIFF WBC: CPT | Performed by: OBSTETRICS & GYNECOLOGY

## 2025-01-09 PROCEDURE — 80048 BASIC METABOLIC PNL TOTAL CA: CPT | Performed by: OBSTETRICS & GYNECOLOGY

## 2025-01-09 PROCEDURE — 36415 COLL VENOUS BLD VENIPUNCTURE: CPT | Performed by: OBSTETRICS & GYNECOLOGY

## 2025-01-09 RX ADMIN — HYDROCORTISONE: 25 CREAM TOPICAL at 11:01

## 2025-01-09 RX ADMIN — ATORVASTATIN CALCIUM 20 MG: 20 TABLET, FILM COATED ORAL at 08:01

## 2025-01-09 RX ADMIN — SENNOSIDES AND DOCUSATE SODIUM 1 TABLET: 50; 8.6 TABLET ORAL at 08:01

## 2025-01-09 RX ADMIN — ASPIRIN 81 MG CHEWABLE TABLET 81 MG: 81 TABLET CHEWABLE at 08:01

## 2025-01-09 RX ADMIN — POLYETHYLENE GLYCOL 3350 17 G: 17 POWDER, FOR SOLUTION ORAL at 08:01

## 2025-01-09 RX ADMIN — THERA TABS 1 TABLET: TAB at 08:01

## 2025-01-09 RX ADMIN — DONEPEZIL HYDROCHLORIDE 5 MG: 5 TABLET ORAL at 08:01

## 2025-01-09 RX ADMIN — APIXABAN 2.5 MG: 2.5 TABLET, FILM COATED ORAL at 08:01

## 2025-01-09 RX ADMIN — ACETAMINOPHEN 650 MG: 325 TABLET ORAL at 08:01

## 2025-01-09 RX ADMIN — HYDROCORTISONE: 25 CREAM TOPICAL at 08:01

## 2025-01-09 NOTE — PT/OT/SLP PROGRESS
"Speech Language Pathology Treatment    Patient Name:  Vy Hussein   MRN:  [id number]  Admitting Diagnosis: Muscular weakness    Recommendations:                 General Recommendations:  Speech/language therapy  Diet recommendations:   ,     Aspiration Precautions:       General Precautions: Standard,    Communication strategies:  provide increased time to answer and go to room if call light pushed    Assessment:     Vy Hussein is a 84 y.o. female with an SLP diagnosis of Aphasia.  She presents with expressive aphasia.    Subjective       Patient goals: Patient will be Independently oriented x4, 5/5 therapy sessions.   Patient answered "wh" questions with 75% accuracy Independently.   Patient completed word finding tasks with 75% accuracy Independently.   Patient completed divergent naming tasks with 75% accuracy Independently.   Patient completed convergent naming tasks with 75% accuracy Independently.   Patient completed problem solving tasks with 70% accuracy Independently.   Patient completed verbal reasoning tasks with 70% accuracy Independently.   Patient completed short term memory tasks with 80% accuracy Independently.      When pt uses slow speech; pt is intelligible.    Pain/Comfort:  Pain Rating 1: 0/10  Pain Rating Post-Intervention 1: 0/10    Respiratory Status: Room air    Objective:     Has the patient been evaluated by SLP for swallowing?   No  Keep patient NPO? No   Current Respiratory Status:            Goals:   Multidisciplinary Problems       SLP Goals       Not on file                    Plan:     Patient to be seen:  3 x/week   Plan of Care expires:  01/13/25  Plan of Care reviewed with:  patient   SLP Follow-Up:  Yes       Discharge recommendations:  Moderate Intensity Therapy   Barriers to Discharge:  Safety Awareness      Time Tracking:     SLP Treatment Date:   01/09/25  Speech Start Time:  1415  Speech Stop Time:  1513     Speech Total Time (min):  58 min    Billable Minutes: " Speech Therapy Individual      01/09/2025    Jenni Corado MSCCC-SLP

## 2025-01-09 NOTE — PT/OT/SLP PROGRESS
Physical Therapy Treatment    Patient Name:  Vy Hussein   MRN:  [id number]    Recommendations:     Discharge Recommendations: Low Intensity Therapy  Discharge Equipment Recommendations: 3-in-1 commode, walker, rolling  Barriers to discharge: None    Assessment:     Vy Hussein is a 84 y.o. female admitted with a medical diagnosis of Muscular weakness.  She presents with the following impairments/functional limitations: weakness, impaired endurance, gait instability, decreased coordination, decreased lower extremity function, pain, decreased ROM, impaired balance, impaired functional mobility. Pt able to ambulate 137 feet with no rest breaks, CGA, and with RW. Also, she tolerated addition of new functional strengthening via sit to stands today.     Rehab Prognosis: Good; patient would benefit from acute skilled PT services to address these deficits and reach maximum level of function.    Recent Surgery: * No surgery found *      Plan:     During this hospitalization, patient to be seen 5 x/week to address the identified rehab impairments via gait training, therapeutic activities, therapeutic exercises, neuromuscular re-education and progress toward the following goals:    Plan of Care Expires:  01/14/25    Subjective     Chief Complaint: pain/soreness and decreased mobility  Patient/Family Comments/goals: increase independence   Pain/Comfort:  Pain Rating 1: 0/10      Objective:     Communicated with LPTA prior to session.  Patient found up in chair with chair check upon PT entry to room.     General Precautions: Standard, fall  Orthopedic Precautions: RLE weight bearing as tolerated  Braces: N/A  Respiratory Status: Room air     Functional Mobility:  Gait: 137 feet, RW, CGA, no rest breaks      AM-PAC 6 CLICK MOBILITY  Turning over in bed (including adjusting bedclothes, sheets and blankets)?: 3  Sitting down on and standing up from a chair with arms (e.g., wheelchair, bedside commode, etc.): 3  Moving  from lying on back to sitting on the side of the bed?: 3  Moving to and from a bed to a chair (including a wheelchair)?: 3  Need to walk in hospital room?: 3  Climbing 3-5 steps with a railing?: 2  Basic Mobility Total Score: 17       Treatment & Education:  Hip adduction isometric: 20 x 3 sec hold  SAQ: 2 x 10, each LE  SLR: 2 x 10, each LE; therapist min assist for RLE  LAQ: 2 x 10, each LE  HS curls with yellow band: 2 x 10, each LE  Resisted hip abduction with yellow band: 2 x 10  Sit to stands, with BUE assist: 2 x 5  Ambulation: from PT department to room (137 feet), RW, CGA, no rest breaks    Patient left sitting edge of bed with call button in reach..    GOALS:   Multidisciplinary Problems       Physical Therapy Goals          Problem: Physical Therapy    Goal Priority Disciplines Outcome Interventions   Physical Therapy Goal     PT, PT/OT     Description: Short term goals  1. Pt will require min assist for bed mobility  2. Pt will require min assist for sit to stand transfer   3. Pt will ambulate 25 feet with RW and CGA    Long term goals  1. Pt will be independent with all bed mobility  2. Pt will be independent/mod independent with sit to stand transfer  3. Pt will perform 5 sit to stands with only 1 UE for assistance  4. Pt will ambulate 50 feet with RW and CGA  5. Pt will be independent with ascending and descending 2 standard height steps with handrail                       Time Tracking:     PT Received On: 01/09/25  PT Start Time: 1332     PT Stop Time: 1410  PT Total Time (min): 38 min     Billable Minutes: Therapeutic Activity 8 minutes and Therapeutic Exercise 30 minutes    Treatment Type: 6th Visit  PT/PTA: PT     Number of PTA visits since last PT visit: 0     Zbigniew Corado PT, DPT   1/9/2025

## 2025-01-09 NOTE — NURSING
Rec'd patient sitting in the chair and watching tv. Able to answer questions and make needs known. Denies any pain at this time. Safety measures and care ongoing.

## 2025-01-09 NOTE — PLAN OF CARE
[facility] - Medical Surgical Unit - Swing Bed   Interdisciplinary Team Meeting    Patient: Vy Hussein   Today's Date: 1/9/2025   Estimated D/C Date: 1/13/2025        Physician: Ema Chamorro MD Unit Director: Alexa Sood RN   Pharmacy: Tawnya Haro, PharmD Nursing: JAZZY Sweeney RN   : Nicky Magana RN Physical/Occupational Therapy: Lizeth Negron OT, VIPIN San PT   Speech Therapy: ST Mendoza Respiratory: See respiratory notes   Dietary: See dietary notes    Other: ELIZABETH Can, RT      Nursing  New Symptoms/Problems: none at this time                                       Urine: incontinent  Judd: No   Bowel: continent  Last Bowel Movement: 01/09/25   Constipated: No  Diarrhea: No   Isolation: No  Cognition: WNL  Aspiration Precautions: No  Wound Care: No  Wound Location/Tx: na  Comment(s): na      Respiratory  O2 Device: Room Air  O2 Flow: na  SpO2: 98%  Neb Tx: No  Comment(s): na      Dietary  Nutrition:  mechanical soft  Comment(s): na     Speech Therapy  Speech/Swallowing: Speech difficulty  Comment(s): expressive aphasia     Physical Therapy  Gait/Assistive Device:   130' ft with rolling walker with CGA and verbal cue to look ahead when walking    ELOS: Plan to DC 1/13/2025    Transfers: Moderate Assistance  Bed Mobility: Activity did not occur Range of Motion/Restrictions: RLE weight bearing as tolerated   Comment(s): na                 Occupational Therapy  Eating/Grooming: Supervision or Set-up Assistance Toileting: Minimal Assistance   Bathing: Minimal Assistance Dressing (Upper Body): Minimal Assistance   Dressing (Lower Body): Moderate Assistance    Comment(s): na   Activity Therapy  Level of participation: Active participation  Comment(s): na     Pharmacy  Medication Changes: No  Labs Reviewed: Yes  New Lab Orders: No  Comment(s): labs q mon/thurs       Tx Plan/Recommendations reviewed with family and/or patient on 1/7/25.  Additional family  Conference/Training: adam  D/C Plan/Recommendations: Home with HH and Home with family  REESE: 1/13/2025  Comment(s): adam

## 2025-01-09 NOTE — PT/OT/SLP PROGRESS
Occupational Therapy   Treatment    Name: Vy Hussein  MRN: [id number]  Admitting Diagnosis:  Muscular weakness       Recommendations:     Discharge Recommendations: Moderate Intensity Therapy  Discharge Equipment Recommendations:  3-in-1 commode, walker, rolling  Barriers to discharge:       Assessment:     Vy Hussein is a 84 y.o. female with a medical diagnosis of Muscular weakness. Performance deficits affecting function are weakness, impaired endurance, impaired self care skills, impaired functional mobility, impaired balance, decreased lower extremity function, decreased safety awareness.     Rehab Prognosis:  Good; patient would benefit from acute skilled OT services to address these deficits and reach maximum level of function.       Plan:     Patient to be seen 5 x/week to address the above listed problems via therapeutic exercises, therapeutic activities, self-care/home management  Plan of Care Expires:    Plan of Care Reviewed with: patient    Subjective     Chief Complaint: Decreased mobility  Patient/Family Comments/goals: increased strength  Pain/Comfort:  Pain Rating 1: 0/10    Objective:     Communicated with: RN prior to session.  Patient found up in chair with   upon OT entry to room.    General Precautions: Standard, fall    Orthopedic Precautions:   Braces:    Respiratory Status: Room air     Occupational Performance:       Functional Mobility/Transfers:  Patient completed Sit <> Stand Transfer with minimum assistance  with  rolling walker   Functional Mobility: min a with ROLLING WALKER and extra time needed    Activities of Daily Living:  Feeding S/u   Grooming S/u   Bathing Min a   UB dsg Min a   LB dsg Mod a   Toileting S/u   Toilet t/f Min a         AMPAC 6 Click ADL: 16    Treatment & Education:  Pt educated on OT role/POC.   Importance of OOB activity with staff assistance.  Importance of sitting up in the chair throughout the day as tolerated, especially for meals   Safety  during functional t/f and mobility  Importance of assisting with self-care activities     Patient completed the following for increased strength to increase I with ADLs: 3# dowel- shoulder press, chest press, bicep curls x 40, yellow theraflex x 40 both ways, yellow theraband- scapular retraction x 40;      Patient left up in chair with call button in reach and chair alarm on    GOALS:   Multidisciplinary Problems       Occupational Therapy Goals          Problem: Occupational Therapy    Goal Priority Disciplines Outcome Interventions   Occupational Therapy Goal     OT, PT/OT Progressing    Description: STG:  Pt will perform grooming with setup  Pt will bathe with Mod I  Pt will perform UE dressing with I after set up  Pt will perform LE dressing with Mod I  Pt will sit EOB x 15 min with no assistance  Pt will transfer bed/chair/bsc with Mod I  Pt will perform standing task x 15 min with supervision assistance  Pt will tolerate 45 minutes of tx without fatigue      LT.Restore to max I with self care and mobility.                         Time Tracking:     OT Date of Treatment: 25  OT Start Time: 812  OT Stop Time: 847  OT Total Time (min): 35 min    Billable Minutes:Therapeutic Exercise 35 min  Lizeth Negron, OTR/L            Number of PHILIP visits since last OT visit: 2025

## 2025-01-09 NOTE — NURSING
Pt with a more productive cough today than days prior. Therapy noting cough more today. Pt tells staff she doesn't feel well, afebrile. Notified Dr. Aponte, order received.

## 2025-01-10 PROCEDURE — 97110 THERAPEUTIC EXERCISES: CPT | Mod: CQ

## 2025-01-10 PROCEDURE — 11000004 HC SNF PRIVATE

## 2025-01-10 PROCEDURE — 99308 SBSQ NF CARE LOW MDM 20: CPT | Mod: ,,, | Performed by: FAMILY MEDICINE

## 2025-01-10 PROCEDURE — 94761 N-INVAS EAR/PLS OXIMETRY MLT: CPT

## 2025-01-10 PROCEDURE — 97110 THERAPEUTIC EXERCISES: CPT

## 2025-01-10 PROCEDURE — 25000003 PHARM REV CODE 250: Performed by: FAMILY MEDICINE

## 2025-01-10 PROCEDURE — 97116 GAIT TRAINING THERAPY: CPT | Mod: CQ

## 2025-01-10 RX ADMIN — ATORVASTATIN CALCIUM 20 MG: 20 TABLET, FILM COATED ORAL at 08:01

## 2025-01-10 RX ADMIN — APIXABAN 2.5 MG: 2.5 TABLET, FILM COATED ORAL at 08:01

## 2025-01-10 RX ADMIN — HYDROCORTISONE: 25 CREAM TOPICAL at 08:01

## 2025-01-10 RX ADMIN — THERA TABS 1 TABLET: TAB at 08:01

## 2025-01-10 RX ADMIN — ASPIRIN 81 MG CHEWABLE TABLET 81 MG: 81 TABLET CHEWABLE at 08:01

## 2025-01-10 RX ADMIN — DONEPEZIL HYDROCHLORIDE 5 MG: 5 TABLET ORAL at 08:01

## 2025-01-10 RX ADMIN — SENNOSIDES AND DOCUSATE SODIUM 1 TABLET: 50; 8.6 TABLET ORAL at 08:01

## 2025-01-10 NOTE — PROGRESS NOTES
[facility] - Medical Surgical Unit  Hospital Medicine  Progress Note    Patient Name: Vy Hussein  MRN: [id number]  Patient Class: IP- Swing   Admission Date: 12/24/2024  Length of Stay: 17 days  Attending Physician: Elizabeth Castro MD  Primary Care Provider: Flora Brennan MD        Subjective     Principal Problem:Muscular weakness        HPI:  No notes on file    Overview/Hospital Course:  12/27/24 - up in chair. No complaints voiced.    12/30/24 - doing well today. No complaints voiced.    1/3/25 - has a cough this AM. Has cough syrup ordered. Will continue present treatment.    1/6/25 - doing well. To have staples removed today.    1/10/25 - doing well. Will continue present treatment.    Interval History: will continue therapy today.    Review of Systems  Objective:     Vital Signs (Most Recent):  Temp: 97.8 °F (36.6 °C) (01/10/25 0750)  Pulse: 77 (01/10/25 0750)  Resp: 18 (01/10/25 0750)  BP: 138/70 (01/10/25 0750)  SpO2: 99 % (01/10/25 0750) Vital Signs (24h Range):  Temp:  [96.8 °F (36 °C)-97.8 °F (36.6 °C)] 97.8 °F (36.6 °C)  Pulse:  [77-92] 77  Resp:  [18] 18  SpO2:  [96 %-99 %] 99 %  BP: (130-138)/(63-75) 138/70     Weight: 39.7 kg (87 lb 9.6 oz)  Body mass index is 14.14 kg/m².    Intake/Output Summary (Last 24 hours) at 1/10/2025 0843  Last data filed at 1/9/2025 1631  Gross per 24 hour   Intake 120 ml   Output 1 ml   Net 119 ml         Physical Exam        Significant Labs: All pertinent labs within the past 24 hours have been reviewed.    Significant Imaging: I have reviewed all pertinent imaging results/findings within the past 24 hours.    Assessment and Plan     No notes have been filed under this hospital service.  Service: Hospital Medicine    VTE Risk Mitigation (From admission, onward)           Ordered     apixaban tablet 2.5 mg  2 times daily         12/25/24 1056     IP VTE HIGH RISK PATIENT  Once         12/24/24 1544                    Discharge Planning   REESE:  1/13/2025     Code Status: Prior   Medical Readiness for Discharge Date:   Discharge Plan A: Home, Home Health                Please place Justification for DME        Ema Chamorro MD  Department of Hospital Medicine   [facility] - Medical Surgical Unit

## 2025-01-10 NOTE — PLAN OF CARE
Problem: Fall Injury Risk  Goal: Absence of Fall and Fall-Related Injury  Outcome: Progressing  Intervention: Identify and Manage Contributors  Flowsheets (Taken 1/10/2025 1421)  Self-Care Promotion:   independence encouraged   BADL personal objects within reach   BADL personal routines maintained   meal set-up provided   adaptive equipment use encouraged  Medication Review/Management:   medications reviewed   high-risk medications identified  Intervention: Promote Injury-Free Environment  Flowsheets (Taken 1/10/2025 1421)  Safety Promotion/Fall Prevention:   assistive device/personal item within reach   Fall Risk signage in place   Fall Risk reviewed with patient/family   in recliner, wheels locked   medications reviewed

## 2025-01-10 NOTE — PT/OT/SLP PROGRESS
Occupational Therapy   Treatment    Name: Vy Hussein  MRN: [id number]  Admitting Diagnosis:  Muscular weakness       Recommendations:     Discharge Recommendations: Moderate Intensity Therapy  Discharge Equipment Recommendations:  3-in-1 commode, walker, rolling  Barriers to discharge:       Assessment:     Vy Hussein is a 84 y.o. female with a medical diagnosis of Muscular weakness. Performance deficits affecting function are weakness, impaired endurance, impaired self care skills, impaired functional mobility, impaired balance, decreased lower extremity function, decreased safety awareness.     Rehab Prognosis:  Good; patient would benefit from acute skilled OT services to address these deficits and reach maximum level of function.       Plan:     Patient to be seen 5 x/week to address the above listed problems via therapeutic exercises, therapeutic activities, self-care/home management  Plan of Care Expires:    Plan of Care Reviewed with: patient    Subjective     Chief Complaint: Decreased mobility  Patient/Family Comments/goals: increased strength  Pain/Comfort:  Pain Rating 1: 0/10    Objective:     Communicated with: RN prior to session.  Patient found up in chair with   upon OT entry to room.    General Precautions: Standard, fall    Orthopedic Precautions:   Braces:    Respiratory Status: Room air     Occupational Performance:       Functional Mobility/Transfers:  Patient completed Sit <> Stand Transfer with minimum assistance  with  rolling walker   Functional Mobility: min a with ROLLING WALKER and extra time needed    Activities of Daily Living:  Feeding S/u   Grooming S/u   Bathing Min a   UB dsg Min a   LB dsg Mod a   Toileting S/u   Toilet t/f Min a         AMPAC 6 Click ADL: 16    Treatment & Education:  Pt educated on OT role/POC.   Importance of OOB activity with staff assistance.  Importance of sitting up in the chair throughout the day as tolerated, especially for meals   Safety  during functional t/f and mobility  Importance of assisting with self-care activities     Patient completed the following for increased strength to increase I with ADLs: 3# dowel- shoulder press, chest press, bicep curls x 40, yellow theraflex x 40 both ways, yellow theraband- scapular retraction x 40; UBE 8 min      Patient left up in chair with call button in reach and chair alarm on    GOALS:   Multidisciplinary Problems       Occupational Therapy Goals          Problem: Occupational Therapy    Goal Priority Disciplines Outcome Interventions   Occupational Therapy Goal     OT, PT/OT Progressing    Description: STG:  Pt will perform grooming with setup  Pt will bathe with Mod I  Pt will perform UE dressing with I after set up  Pt will perform LE dressing with Mod I  Pt will sit EOB x 15 min with no assistance  Pt will transfer bed/chair/bsc with Mod I  Pt will perform standing task x 15 min with supervision assistance  Pt will tolerate 45 minutes of tx without fatigue      LT.Restore to max I with self care and mobility.                         Time Tracking:     OT Date of Treatment: 01/10/25  OT Start Time: 0943  OT Stop Time: 1014  OT Total Time (min): 31 min    Billable Minutes:Therapeutic Exercise 31 min  Lizeth Negron OTR/L            Number of PHILIP visits since last OT visit: 1    1/10/2025

## 2025-01-10 NOTE — PT/OT/SLP PROGRESS
Physical Therapy Treatment    Patient Name:  Vy Hussein   MRN:  [id number]    Recommendations:     Discharge Recommendations: Moderate Intensity Therapy  Discharge Equipment Recommendations: 3-in-1 commode, walker, rolling  Barriers to discharge: None    Assessment:     Vy Hussein is a 84 y.o. female admitted with a medical diagnosis of Muscular weakness.  She presents with the following impairments/functional limitations: weakness, impaired endurance, impaired self care skills, impaired functional mobility, gait instability, impaired balance, decreased upper extremity function, decreased lower extremity function, decreased safety awareness, orthopedic precautions .  Patient requires frequent rest breaks during completion of Therapeutic exercises due to reports of muscle fatigue.  Patient demonstrates good carryover and understanding for safety precautions during transfers and gait training.  No adverse effects noted.           Rehab Prognosis: Good; patient would benefit from acute skilled PT services to address these deficits and reach maximum level of function.    Recent Surgery: * No surgery found *      Plan:     During this hospitalization, patient to be seen 5 x/week to address the identified rehab impairments via gait training, therapeutic exercises and progress toward the following goals:    Plan of Care Expires:  01/14/25    Subjective     Chief Complaint: none  Patient/Family Comments/goals: improve safety and independence with mobility for safe return home.   Pain/Comfort:  Pain Rating 1: 0/10      Objective:     Communicated with patient prior to session.  Patient denied pain and was agreeable to treatment session.     General Precautions: Standard, fall  Orthopedic Precautions: RLE weight bearing as tolerated  Braces: N/A  Respiratory Status: Room air     Functional Mobility:  Transfers:     Sit to Stand:  SBA with rolling walker  Gait: 130' ft with rolling walker with CGA and verbal cue to  "look ahead when walking      AM-PAC 6 CLICK MOBILITY  Turning over in bed (including adjusting bedclothes, sheets and blankets)?: 3  Sitting down on and standing up from a chair with arms (e.g., wheelchair, bedside commode, etc.): 3  Moving from lying on back to sitting on the side of the bed?: 3  Moving to and from a bed to a chair (including a wheelchair)?: 3  Need to walk in hospital room?: 3  Climbing 3-5 steps with a railing?: 2  Basic Mobility Total Score: 17       Treatment & Education:  Therapeutic Exercise  Reps        LAQs  2 x 10, 3"    Hamstring Curls     Hip ADD  3 x 10 3"    Short Arc Quads'  3 x 10,    Ankle Pumps  30 x    Quad Sets  3 x 10    Glute Sets  3 x 10 3"    Seated hip rotation  2 x 10   Seated Marching  2 x 10    Hip abduction         Therapeutic Activities  Reps (not  completed)        Sit <>stand  3 x    Right LE hip and knee flexion  2 x 10    Heel Raises  2 x 10    Right LE hip extension  2 x 10    Right LE hip abduction  2 x 10      Patient left up in chair with call button in reach..    GOALS:   Multidisciplinary Problems       Physical Therapy Goals          Problem: Physical Therapy    Goal Priority Disciplines Outcome Interventions   Physical Therapy Goal     PT, PT/OT     Description: Short term goals  1. Pt will require min assist for bed mobility  2. Pt will require min assist for sit to stand transfer   3. Pt will ambulate 25 feet with RW and CGA    Long term goals  1. Pt will be independent with all bed mobility  2. Pt will be independent/mod independent with sit to stand transfer  3. Pt will perform 5 sit to stands with only 1 UE for assistance  4. Pt will ambulate 50 feet with RW and CGA  5. Pt will be independent with ascending and descending 2 standard height steps with handrail                       Time Tracking:     PT Received On: 01/10/25  PT Start Time: 0855     PT Stop Time: 0930  PT Total Time (min): 35 min     Billable Minutes: Gait 10, Therapeutic exercise " 25    Treatment Type: Treatment  PT/PTA: PTA     Number of PTA visits since last PT visit: 1     Continue Plan of Care per PT order to progress patient toward rehab goals as tolerated by patient.   PERLA Rosenthal   01/10/2025

## 2025-01-10 NOTE — SUBJECTIVE & OBJECTIVE
Interval History: will continue therapy today.    Review of Systems  Objective:     Vital Signs (Most Recent):  Temp: 97.8 °F (36.6 °C) (01/10/25 0750)  Pulse: 77 (01/10/25 0750)  Resp: 18 (01/10/25 0750)  BP: 138/70 (01/10/25 0750)  SpO2: 99 % (01/10/25 0750) Vital Signs (24h Range):  Temp:  [96.8 °F (36 °C)-97.8 °F (36.6 °C)] 97.8 °F (36.6 °C)  Pulse:  [77-92] 77  Resp:  [18] 18  SpO2:  [96 %-99 %] 99 %  BP: (130-138)/(63-75) 138/70     Weight: 39.7 kg (87 lb 9.6 oz)  Body mass index is 14.14 kg/m².    Intake/Output Summary (Last 24 hours) at 1/10/2025 0843  Last data filed at 1/9/2025 1631  Gross per 24 hour   Intake 120 ml   Output 1 ml   Net 119 ml         Physical Exam        Significant Labs: All pertinent labs within the past 24 hours have been reviewed.    Significant Imaging: I have reviewed all pertinent imaging results/findings within the past 24 hours.

## 2025-01-11 PROCEDURE — 11000004 HC SNF PRIVATE

## 2025-01-11 PROCEDURE — 25000003 PHARM REV CODE 250: Performed by: FAMILY MEDICINE

## 2025-01-11 PROCEDURE — 94761 N-INVAS EAR/PLS OXIMETRY MLT: CPT

## 2025-01-11 RX ADMIN — THERA TABS 1 TABLET: TAB at 08:01

## 2025-01-11 RX ADMIN — APIXABAN 2.5 MG: 2.5 TABLET, FILM COATED ORAL at 08:01

## 2025-01-11 RX ADMIN — APIXABAN 2.5 MG: 2.5 TABLET, FILM COATED ORAL at 09:01

## 2025-01-11 RX ADMIN — SENNOSIDES AND DOCUSATE SODIUM 1 TABLET: 50; 8.6 TABLET ORAL at 09:01

## 2025-01-11 RX ADMIN — ASPIRIN 81 MG CHEWABLE TABLET 81 MG: 81 TABLET CHEWABLE at 08:01

## 2025-01-11 RX ADMIN — DONEPEZIL HYDROCHLORIDE 5 MG: 5 TABLET ORAL at 09:01

## 2025-01-11 RX ADMIN — ATORVASTATIN CALCIUM 20 MG: 20 TABLET, FILM COATED ORAL at 08:01

## 2025-01-11 NOTE — PLAN OF CARE
Problem: Adult Inpatient Plan of Care  Goal: Plan of Care Review  Outcome: Progressing  Goal: Patient-Specific Goal (Individualized)  Outcome: Progressing  Goal: Absence of Hospital-Acquired Illness or Injury  Outcome: Progressing  Goal: Optimal Comfort and Wellbeing  Outcome: Progressing     Problem: Skin Injury Risk Increased  Goal: Skin Health and Integrity  Outcome: Progressing     Problem: Fall Injury Risk  Goal: Absence of Fall and Fall-Related Injury  Outcome: Progressing  Intervention: Identify and Manage Contributors  Flowsheets (Taken 1/10/2025 2228)  Self-Care Promotion:   independence encouraged   BADL personal objects within reach   BADL personal routines maintained   adaptive equipment use encouraged  Medication Review/Management: medications reviewed  Intervention: Promote Injury-Free Environment  Flowsheets (Taken 1/10/2025 2228)  Safety Promotion/Fall Prevention:   assistive device/personal item within reach   bed alarm set   diversional activities provided   side rails raised x 3   instructed to call staff for mobility   lighting adjusted   medications reviewed   muscle strengthening facilitated   nonskid shoes/socks when out of bed

## 2025-01-11 NOTE — PLAN OF CARE
Problem: Adult Inpatient Plan of Care  Goal: Patient-Specific Goal (Individualized)  Outcome: Progressing  Goal: Optimal Comfort and Wellbeing  Outcome: Progressing  Intervention: Monitor Pain and Promote Comfort  Flowsheets (Taken 1/11/2025 1637)  Pain Management Interventions:   care clustered   medication offered   pillow support provided   position adjusted  Intervention: Provide Person-Centered Care  Flowsheets (Taken 1/11/2025 1637)  Trust Relationship/Rapport:   care explained   choices provided   emotional support provided   empathic listening provided   questions answered   questions encouraged   reassurance provided   thoughts/feelings acknowledged     Problem: Fall Injury Risk  Goal: Absence of Fall and Fall-Related Injury  Outcome: Progressing  Intervention: Identify and Manage Contributors  Flowsheets (Taken 1/11/2025 1637)  Self-Care Promotion:   independence encouraged   BADL personal objects within reach   BADL personal routines maintained   meal set-up provided   adaptive equipment use encouraged  Medication Review/Management:   medications reviewed   high-risk medications identified  Intervention: Promote Injury-Free Environment  Flowsheets (Taken 1/11/2025 1637)  Safety Promotion/Fall Prevention:   assistive device/personal item within reach   chair alarm set   Fall Risk signage in place   Fall Risk reviewed with patient/family   high risk medications identified   in recliner, wheels locked   instructed to call staff for mobility   nonskid shoes/socks when out of bed

## 2025-01-12 PROCEDURE — 94761 N-INVAS EAR/PLS OXIMETRY MLT: CPT

## 2025-01-12 PROCEDURE — 11000004 HC SNF PRIVATE

## 2025-01-12 PROCEDURE — 25000003 PHARM REV CODE 250: Performed by: FAMILY MEDICINE

## 2025-01-12 RX ADMIN — APIXABAN 2.5 MG: 2.5 TABLET, FILM COATED ORAL at 08:01

## 2025-01-12 RX ADMIN — SENNOSIDES AND DOCUSATE SODIUM 1 TABLET: 50; 8.6 TABLET ORAL at 08:01

## 2025-01-12 RX ADMIN — ATORVASTATIN CALCIUM 20 MG: 20 TABLET, FILM COATED ORAL at 08:01

## 2025-01-12 RX ADMIN — HYDROCORTISONE: 25 CREAM TOPICAL at 08:01

## 2025-01-12 RX ADMIN — THERA TABS 1 TABLET: TAB at 08:01

## 2025-01-12 RX ADMIN — ASPIRIN 81 MG CHEWABLE TABLET 81 MG: 81 TABLET CHEWABLE at 08:01

## 2025-01-12 RX ADMIN — DONEPEZIL HYDROCHLORIDE 5 MG: 5 TABLET ORAL at 08:01

## 2025-01-12 NOTE — PLAN OF CARE
Problem: Adult Inpatient Plan of Care  Goal: Plan of Care Review  Outcome: Progressing  Goal: Patient-Specific Goal (Individualized)  Outcome: Progressing  Goal: Absence of Hospital-Acquired Illness or Injury  Outcome: Progressing     Problem: Skin Injury Risk Increased  Goal: Skin Health and Integrity  Outcome: Progressing     Problem: Fall Injury Risk  Goal: Absence of Fall and Fall-Related Injury  Outcome: Progressing  Intervention: Identify and Manage Contributors  Flowsheets (Taken 1/11/2025 2305)  Self-Care Promotion:   independence encouraged   BADL personal objects within reach   BADL personal routines maintained   adaptive equipment use encouraged  Medication Review/Management: medications reviewed  Intervention: Promote Injury-Free Environment  Flowsheets (Taken 1/11/2025 2305)  Safety Promotion/Fall Prevention:   assistive device/personal item within reach   bed alarm set   diversional activities provided   instructed to call staff for mobility   lighting adjusted   medications reviewed   muscle strengthening facilitated   nonskid shoes/socks when out of bed   side rails raised x 3

## 2025-01-12 NOTE — PLAN OF CARE
Problem: Adult Inpatient Plan of Care  Goal: Patient-Specific Goal (Individualized)  Outcome: Progressing  Goal: Optimal Comfort and Wellbeing  Outcome: Progressing  Intervention: Monitor Pain and Promote Comfort  Flowsheets (Taken 1/12/2025 1546)  Pain Management Interventions:   care clustered   pillow support provided   position adjusted  Intervention: Provide Person-Centered Care  Flowsheets (Taken 1/12/2025 1546)  Trust Relationship/Rapport:   care explained   choices provided   emotional support provided   empathic listening provided   questions answered   questions encouraged   reassurance provided   thoughts/feelings acknowledged     Problem: Skin Injury Risk Increased  Goal: Skin Health and Integrity  Outcome: Progressing  Intervention: Optimize Skin Protection  Flowsheets (Taken 1/12/2025 1546)  Pressure Reduction Techniques:   positioned off wounds   pressure points protected  Pressure Reduction Devices:   foam padding utilized   positioning supports utilized  Skin Protection: incontinence pads utilized  Activity Management:   Ambulated -L4   Ambulated to bathroom - L4   Sitting at edge of bed - L2  Intervention: Promote and Optimize Oral Intake  Flowsheets (Taken 1/12/2025 1546)  Oral Nutrition Promotion:   physical activity promoted   rest periods promoted  Nutrition Interventions: meal set-up provided

## 2025-01-13 LAB
ANION GAP SERPL CALCULATED.3IONS-SCNC: 13 MMOL/L (ref 7–16)
BASOPHILS # BLD AUTO: 0.09 K/UL (ref 0–0.2)
BASOPHILS NFR BLD AUTO: 1 % (ref 0–1)
BUN SERPL-MCNC: 15 MG/DL (ref 10–20)
BUN/CREAT SERPL: 18 (ref 6–20)
CALCIUM SERPL-MCNC: 10 MG/DL (ref 8.4–10.2)
CHLORIDE SERPL-SCNC: 103 MMOL/L (ref 98–107)
CO2 SERPL-SCNC: 28 MMOL/L (ref 23–31)
CREAT SERPL-MCNC: 0.82 MG/DL (ref 0.55–1.02)
DIFFERENTIAL METHOD BLD: ABNORMAL
EGFR (NO RACE VARIABLE) (RUSH/TITUS): 71 ML/MIN/1.73M2
EOSINOPHIL # BLD AUTO: 0.31 K/UL (ref 0–0.5)
EOSINOPHIL NFR BLD AUTO: 3.4 % (ref 1–4)
ERYTHROCYTE [DISTWIDTH] IN BLOOD BY AUTOMATED COUNT: 15.9 % (ref 11.5–14.5)
GLUCOSE SERPL-MCNC: 116 MG/DL (ref 82–115)
HCT VFR BLD AUTO: 37.8 % (ref 38–47)
HGB BLD-MCNC: 12.3 G/DL (ref 12–16)
IMM GRANULOCYTES # BLD AUTO: 0.02 K/UL (ref 0–0.04)
IMM GRANULOCYTES NFR BLD: 0.2 % (ref 0–0.4)
LYMPHOCYTES # BLD AUTO: 1.76 K/UL (ref 1–4.8)
LYMPHOCYTES NFR BLD AUTO: 19.2 % (ref 27–41)
MAGNESIUM SERPL-MCNC: 2.2 MG/DL (ref 1.6–2.6)
MCH RBC QN AUTO: 30.5 PG (ref 27–31)
MCHC RBC AUTO-ENTMCNC: 32.5 G/DL (ref 32–36)
MCV RBC AUTO: 93.8 FL (ref 80–96)
MONOCYTES # BLD AUTO: 0.5 K/UL (ref 0–0.8)
MONOCYTES NFR BLD AUTO: 5.5 % (ref 2–6)
MPC BLD CALC-MCNC: 9.9 FL (ref 9.4–12.4)
NEUTROPHILS # BLD AUTO: 6.49 K/UL (ref 1.8–7.7)
NEUTROPHILS NFR BLD AUTO: 70.7 % (ref 53–65)
NRBC # BLD AUTO: 0 X10E3/UL
NRBC, AUTO (.00): 0 %
PHOSPHATE SERPL-MCNC: 3.6 MG/DL (ref 2.3–4.7)
PLATELET # BLD AUTO: 287 K/UL (ref 150–400)
POTASSIUM SERPL-SCNC: 3.8 MMOL/L (ref 3.5–5.1)
RBC # BLD AUTO: 4.03 M/UL (ref 4.2–5.4)
SODIUM SERPL-SCNC: 140 MMOL/L (ref 136–145)
WBC # BLD AUTO: 9.17 K/UL (ref 4.5–11)

## 2025-01-13 PROCEDURE — 83735 ASSAY OF MAGNESIUM: CPT | Performed by: FAMILY MEDICINE

## 2025-01-13 PROCEDURE — 92507 TX SP LANG VOICE COMM INDIV: CPT

## 2025-01-13 PROCEDURE — 84100 ASSAY OF PHOSPHORUS: CPT | Performed by: FAMILY MEDICINE

## 2025-01-13 PROCEDURE — 11000004 HC SNF PRIVATE

## 2025-01-13 PROCEDURE — 99308 SBSQ NF CARE LOW MDM 20: CPT | Mod: ,,, | Performed by: FAMILY MEDICINE

## 2025-01-13 PROCEDURE — 97110 THERAPEUTIC EXERCISES: CPT | Mod: CQ

## 2025-01-13 PROCEDURE — 97116 GAIT TRAINING THERAPY: CPT | Mod: CQ

## 2025-01-13 PROCEDURE — 25000003 PHARM REV CODE 250: Performed by: FAMILY MEDICINE

## 2025-01-13 PROCEDURE — 85025 COMPLETE CBC W/AUTO DIFF WBC: CPT | Performed by: FAMILY MEDICINE

## 2025-01-13 PROCEDURE — 36415 COLL VENOUS BLD VENIPUNCTURE: CPT | Performed by: FAMILY MEDICINE

## 2025-01-13 PROCEDURE — 80048 BASIC METABOLIC PNL TOTAL CA: CPT | Performed by: FAMILY MEDICINE

## 2025-01-13 PROCEDURE — 97110 THERAPEUTIC EXERCISES: CPT

## 2025-01-13 PROCEDURE — 94761 N-INVAS EAR/PLS OXIMETRY MLT: CPT

## 2025-01-13 RX ADMIN — SENNOSIDES AND DOCUSATE SODIUM 1 TABLET: 50; 8.6 TABLET ORAL at 09:01

## 2025-01-13 RX ADMIN — HYDROCORTISONE: 25 CREAM TOPICAL at 09:01

## 2025-01-13 RX ADMIN — THERA TABS 1 TABLET: TAB at 09:01

## 2025-01-13 RX ADMIN — DONEPEZIL HYDROCHLORIDE 5 MG: 5 TABLET ORAL at 09:01

## 2025-01-13 RX ADMIN — POLYETHYLENE GLYCOL 3350 17 G: 17 POWDER, FOR SOLUTION ORAL at 09:01

## 2025-01-13 RX ADMIN — APIXABAN 2.5 MG: 2.5 TABLET, FILM COATED ORAL at 09:01

## 2025-01-13 RX ADMIN — ATORVASTATIN CALCIUM 20 MG: 20 TABLET, FILM COATED ORAL at 09:01

## 2025-01-13 RX ADMIN — ASPIRIN 81 MG CHEWABLE TABLET 81 MG: 81 TABLET CHEWABLE at 09:01

## 2025-01-13 NOTE — PLAN OF CARE
Insurance approved additional swing bed days. Next review date is 1/16/25. Pt's nephew Solo notified.

## 2025-01-13 NOTE — SUBJECTIVE & OBJECTIVE
Interval History: will continue present treatment.    Review of Systems  Objective:     Vital Signs (Most Recent):  Temp: 97.9 °F (36.6 °C) (01/13/25 0815)  Pulse: 89 (01/13/25 0815)  Resp: 18 (01/13/25 0815)  BP: 97/60 (01/13/25 0815)  SpO2: 97 % (01/13/25 0815) Vital Signs (24h Range):  Temp:  [97.8 °F (36.6 °C)-97.9 °F (36.6 °C)] 97.9 °F (36.6 °C)  Pulse:  [88-90] 89  Resp:  [18-20] 18  SpO2:  [96 %-97 %] 97 %  BP: ()/(60-77) 97/60     Weight: 39.7 kg (87 lb 9.6 oz)  Body mass index is 14.14 kg/m².    Intake/Output Summary (Last 24 hours) at 1/13/2025 0857  Last data filed at 1/12/2025 1841  Gross per 24 hour   Intake 480 ml   Output --   Net 480 ml         Physical Exam        Significant Labs: All pertinent labs within the past 24 hours have been reviewed.    Significant Imaging: I have reviewed all pertinent imaging results/findings within the past 24 hours.

## 2025-01-13 NOTE — PROGRESS NOTES
[facility] - [facility]  Hospital Medicine  Progress Note    Patient Name: Vy Hussein  MRN: [id number]  Patient Class: IP- Swing   Admission Date: 12/24/2024  Length of Stay: 20 days  Attending Physician: Elizabeth Castro MD  Primary Care Provider: Flora Brennan MD        Subjective     Principal Problem:Muscular weakness        HPI:  No notes on file    Overview/Hospital Course:  12/27/24 - up in chair. No complaints voiced.    12/30/24 - doing well today. No complaints voiced.    1/3/25 - has a cough this AM. Has cough syrup ordered. Will continue present treatment.    1/6/25 - doing well. To have staples removed today.    1/10/25 - doing well. Will continue present treatment.    1/13/25 - doing well. Will continue present treatment.    Interval History: will continue present treatment.    Review of Systems  Objective:     Vital Signs (Most Recent):  Temp: 97.9 °F (36.6 °C) (01/13/25 0815)  Pulse: 89 (01/13/25 0815)  Resp: 18 (01/13/25 0815)  BP: 97/60 (01/13/25 0815)  SpO2: 97 % (01/13/25 0815) Vital Signs (24h Range):  Temp:  [97.8 °F (36.6 °C)-97.9 °F (36.6 °C)] 97.9 °F (36.6 °C)  Pulse:  [88-90] 89  Resp:  [18-20] 18  SpO2:  [96 %-97 %] 97 %  BP: ()/(60-77) 97/60     Weight: 39.7 kg (87 lb 9.6 oz)  Body mass index is 14.14 kg/m².    Intake/Output Summary (Last 24 hours) at 1/13/2025 0857  Last data filed at 1/12/2025 1841  Gross per 24 hour   Intake 480 ml   Output --   Net 480 ml         Physical Exam        Significant Labs: All pertinent labs within the past 24 hours have been reviewed.    Significant Imaging: I have reviewed all pertinent imaging results/findings within the past 24 hours.    Assessment and Plan     No notes have been filed under this hospital service.  Service: Hospital Medicine    VTE Risk Mitigation (From admission, onward)           Ordered     apixaban tablet 2.5 mg  2 times daily         12/25/24 1056     IP VTE HIGH RISK PATIENT  Once          12/24/24 1544                    Discharge Planning   REESE:      Code Status: Prior   Medical Readiness for Discharge Date:   Discharge Plan A: Home, Home Health                Please place Justification for DME        Ema Chamorro MD  Department of Hospital Medicine   [facility]

## 2025-01-13 NOTE — PLAN OF CARE
Problem: Adult Inpatient Plan of Care  Goal: Plan of Care Review  Outcome: Progressing  Goal: Patient-Specific Goal (Individualized)  Outcome: Progressing  Goal: Absence of Hospital-Acquired Illness or Injury  Outcome: Progressing     Problem: Skin Injury Risk Increased  Goal: Skin Health and Integrity  Outcome: Progressing     Problem: Fall Injury Risk  Goal: Absence of Fall and Fall-Related Injury  Outcome: Progressing  Intervention: Identify and Manage Contributors  Flowsheets (Taken 1/12/2025 2236)  Self-Care Promotion:   independence encouraged   BADL personal objects within reach   BADL personal routines maintained   adaptive equipment use encouraged  Medication Review/Management: medications reviewed  Intervention: Promote Injury-Free Environment  Flowsheets (Taken 1/12/2025 2236)  Safety Promotion/Fall Prevention:   assistive device/personal item within reach   bed alarm set   diversional activities provided   instructed to call staff for mobility   lighting adjusted   medications reviewed   muscle strengthening facilitated   nonskid shoes/socks when out of bed   side rails raised x 3

## 2025-01-13 NOTE — PT/OT/SLP PROGRESS
Physical Therapy Treatment    Patient Name:  Vy Hussein   MRN:  [id number]    Recommendations:     Discharge Recommendations: Moderate Intensity Therapy  Discharge Equipment Recommendations: 3-in-1 commode, walker, rolling  Barriers to discharge: None    Assessment:     Vy Hussein is a 84 y.o. female admitted with a medical diagnosis of Muscular weakness.  She presents with the following impairments/functional limitations: weakness, impaired endurance, impaired self care skills, impaired functional mobility, gait instability, impaired balance, decreased upper extremity function, decreased lower extremity function, pain .  Patient requires frequent rest breaks during completion of Therapeutic exercises due to reports of muscle fatigue.  Patient demonstrates good carryover and understanding for safety precautions during transfers and gait training.  No adverse effects noted.           Rehab Prognosis: Good; patient would benefit from acute skilled PT services to address these deficits and reach maximum level of function.    Recent Surgery: * No surgery found *      Plan:     During this hospitalization, patient to be seen 5 x/week to address the identified rehab impairments via gait training, therapeutic exercises and progress toward the following goals:    Plan of Care Expires:  01/14/25    Subjective     Chief Complaint: none  Patient/Family Comments/goals: improve safety and independence with mobility for safe return home.   Pain/Comfort:  Pain Rating 1: 0/10      Objective:     Communicated with patient prior to session.  Patient denied pain and was agreeable to treatment session.     General Precautions: Standard, fall  Orthopedic Precautions: RLE weight bearing as tolerated  Braces: N/A  Respiratory Status: Room air     Functional Mobility:  Transfers:     Sit to Stand:  SBA with rolling walker  Gait: 150' ft with rolling walker with CGA and verbal cue to look ahead when walking      AM-PAC 6 CLICK  "MOBILITY  Turning over in bed (including adjusting bedclothes, sheets and blankets)?: 3  Sitting down on and standing up from a chair with arms (e.g., wheelchair, bedside commode, etc.): 3  Moving from lying on back to sitting on the side of the bed?: 3  Moving to and from a bed to a chair (including a wheelchair)?: 3  Need to walk in hospital room?: 3  Climbing 3-5 steps with a railing?: 2  Basic Mobility Total Score: 17       Treatment & Education:  Therapeutic Exercise  Reps        LAQs  2 x 10, 3"    Hamstring Curls     Hip ADD  3 x 10 3"    Short Arc Quads'  3 x 10, 2#    Ankle Pumps  30 x    Quad Sets  3 x 10    Glute Sets  3 x 10 3"    Seated hip rotation  3 x 10, 2 #    Seated Marching  2 x 10, 2#    Hip abduction         Therapeutic Activities  Reps (not  completed)        Sit <>stand  3 x    Right LE hip and knee flexion  2 x 10    Heel Raises  2 x 10    Right LE hip extension  2 x 10    Right LE hip abduction  2 x 10      Patient left up in chair with call button in reach..    GOALS:   Multidisciplinary Problems       Physical Therapy Goals          Problem: Physical Therapy    Goal Priority Disciplines Outcome Interventions   Physical Therapy Goal     PT, PT/OT     Description: Short term goals  1. Pt will require min assist for bed mobility  2. Pt will require min assist for sit to stand transfer   3. Pt will ambulate 25 feet with RW and CGA    Long term goals  1. Pt will be independent with all bed mobility  2. Pt will be independent/mod independent with sit to stand transfer  3. Pt will perform 5 sit to stands with only 1 UE for assistance  4. Pt will ambulate 50 feet with RW and CGA  5. Pt will be independent with ascending and descending 2 standard height steps with handrail                       Time Tracking:     PT Received On: 01/13/25  PT Start Time: 1338     PT Stop Time: 1420  PT Total Time (min): 42 min     Billable Minutes: Gait 10,  Therapeutic exercise 30     Treatment Type: " Treatment  PT/PTA: PTA     Number of PTA visits since last PT visit: 2     Continue Plan of Care per PT order to progress patient toward rehab goals as tolerated by patient.   PERLA Rosenthal   01/13/2025

## 2025-01-13 NOTE — PT/OT/SLP PROGRESS
Occupational Therapy   Treatment    Name: Vy Hussein  MRN: [id number]  Admitting Diagnosis:  Muscular weakness       Recommendations:     Discharge Recommendations: Moderate Intensity Therapy  Discharge Equipment Recommendations:  3-in-1 commode, walker, rolling  Barriers to discharge:       Assessment:     Vy Hussein is a 84 y.o. female with a medical diagnosis of Muscular weakness. Performance deficits affecting function are weakness, impaired endurance, impaired self care skills, impaired functional mobility, impaired balance, decreased lower extremity function, decreased safety awareness.     Rehab Prognosis:  Good; patient would benefit from acute skilled OT services to address these deficits and reach maximum level of function.       Plan:     Patient to be seen 5 x/week to address the above listed problems via therapeutic exercises, therapeutic activities, self-care/home management  Plan of Care Expires:    Plan of Care Reviewed with: patient    Subjective     Chief Complaint: Decreased mobility  Patient/Family Comments/goals: increased strength  Pain/Comfort:  Pain Rating 1: 0/10    Objective:     Communicated with: RN prior to session.  Patient found up in chair with   upon OT entry to room.    General Precautions: Standard, fall    Orthopedic Precautions:   Braces:    Respiratory Status: Room air     Occupational Performance:       Functional Mobility/Transfers:  Patient completed Sit <> Stand Transfer with minimum assistance  with  rolling walker   Functional Mobility: min a with ROLLING WALKER and extra time needed    Activities of Daily Living:  Feeding S/u   Grooming S/u   Bathing Min a   UB dsg Min a   LB dsg Mod a   Toileting S/u   Toilet t/f Min a         AMPAC 6 Click ADL: 16    Treatment & Education:  Pt educated on OT role/POC.   Importance of OOB activity with staff assistance.  Importance of sitting up in the chair throughout the day as tolerated, especially for meals   Safety  during functional t/f and mobility  Importance of assisting with self-care activities     Patient completed the following for increased strength to increase I with ADLs: 3# dowel- shoulder press, chest press, bicep curls x 40, yellow theraflex x 40 both ways, yellow theraband- scapular retraction x 40; UBE 8 min      Patient left up in chair with call button in reach and chair alarm on    GOALS:   Multidisciplinary Problems       Occupational Therapy Goals          Problem: Occupational Therapy    Goal Priority Disciplines Outcome Interventions   Occupational Therapy Goal     OT, PT/OT Progressing    Description: STG:  Pt will perform grooming with setup  Pt will bathe with Mod I  Pt will perform UE dressing with I after set up  Pt will perform LE dressing with Mod I  Pt will sit EOB x 15 min with no assistance  Pt will transfer bed/chair/bsc with Mod I  Pt will perform standing task x 15 min with supervision assistance  Pt will tolerate 45 minutes of tx without fatigue      LT.Restore to max I with self care and mobility.                         Time Tracking:     OT Date of Treatment: 25  OT Start Time: 1256  OT Stop Time: 1328  OT Total Time (min): 32 min    Billable Minutes:Therapeutic Exercise 32 min  Lizeth Negron OTR/L            Number of PHILIP visits since last OT visit: 2025

## 2025-01-13 NOTE — PT/OT/SLP PROGRESS
"Speech Language Pathology Treatment    Patient Name:  Vy Hussein   MRN:  [id number]  Admitting Diagnosis: Muscular weakness    Recommendations:                 General Recommendations:  Speech/language therapy  Diet recommendations:   ,     Aspiration Precautions: Standard aspiration precautions   General Precautions: Standard,    Communication strategies:   be patient    Assessment:     Vy Hussein is a 84 y.o. female with an SLP diagnosis of Aphasia.  She presents with expressive aphasia.    Subjective     Pt was seen in room. Pt participated in therapy without difficulty.   Patient goals: Patient goals: Patient will be Independently oriented x4, 5/5 therapy sessions.   Patient answered "wh" questions with 78% accuracy Independently.   Patient completed word finding tasks with 78% accuracy Independently.   Patient completed divergent naming tasks with 78% accuracy Independently.   Patient completed convergent naming tasks with 78% accuracy Independently.   Patient completed problem solving tasks with 75% accuracy Independently.   Patient completed verbal reasoning tasks with 75% accuracy Independently.   Patient completed short term memory tasks with 85% accuracy Independently.       Pain/Comfort:       Respiratory Status: Room air    Objective:     Has the patient been evaluated by SLP for swallowing?      Keep patient NPO?     Current Respiratory Status:        Room air without difficulty.          Plan:     Patient to be seen:  3 x/week   Plan of Care expires:  01/13/25  Plan of Care reviewed with:  patient   SLP Follow-Up:  Yes       Discharge recommendations:  Moderate Intensity Therapy   Barriers to Discharge:  None    Time Tracking:     SLP Treatment Date:   01/13/25  Speech Start Time:  1550  Speech Stop Time:  1630     Speech Total Time (min):  40 min    Billable Minutes: Total Time 40    01/13/2025    "

## 2025-01-14 PROCEDURE — 25000003 PHARM REV CODE 250: Performed by: FAMILY MEDICINE

## 2025-01-14 PROCEDURE — 25000003 PHARM REV CODE 250: Performed by: EMERGENCY MEDICINE

## 2025-01-14 PROCEDURE — 94761 N-INVAS EAR/PLS OXIMETRY MLT: CPT

## 2025-01-14 PROCEDURE — 11000004 HC SNF PRIVATE

## 2025-01-14 PROCEDURE — 99308 SBSQ NF CARE LOW MDM 20: CPT | Mod: ,,, | Performed by: FAMILY MEDICINE

## 2025-01-14 RX ORDER — ONDANSETRON 4 MG/1
4 TABLET, ORALLY DISINTEGRATING ORAL ONCE
Status: COMPLETED | OUTPATIENT
Start: 2025-01-14 | End: 2025-01-14

## 2025-01-14 RX ORDER — LOPERAMIDE HYDROCHLORIDE 2 MG/1
2 CAPSULE ORAL 4 TIMES DAILY PRN
Status: DISCONTINUED | OUTPATIENT
Start: 2025-01-14 | End: 2025-02-01 | Stop reason: HOSPADM

## 2025-01-14 RX ADMIN — APIXABAN 2.5 MG: 2.5 TABLET, FILM COATED ORAL at 09:01

## 2025-01-14 RX ADMIN — ONDANSETRON 4 MG: 4 TABLET, ORALLY DISINTEGRATING ORAL at 01:01

## 2025-01-14 RX ADMIN — LOPERAMIDE HYDROCHLORIDE 2 MG: 2 CAPSULE ORAL at 12:01

## 2025-01-14 RX ADMIN — HYDROCORTISONE: 25 CREAM TOPICAL at 09:01

## 2025-01-14 RX ADMIN — LOPERAMIDE HYDROCHLORIDE 2 MG: 2 CAPSULE ORAL at 05:01

## 2025-01-14 RX ADMIN — ATORVASTATIN CALCIUM 20 MG: 20 TABLET, FILM COATED ORAL at 09:01

## 2025-01-14 RX ADMIN — THERA TABS 1 TABLET: TAB at 09:01

## 2025-01-14 RX ADMIN — DONEPEZIL HYDROCHLORIDE 5 MG: 5 TABLET ORAL at 09:01

## 2025-01-14 RX ADMIN — ASPIRIN 81 MG CHEWABLE TABLET 81 MG: 81 TABLET CHEWABLE at 09:01

## 2025-01-14 RX ADMIN — CALCIUM CARBONATE (ANTACID) CHEW TAB 500 MG 500 MG: 500 CHEW TAB at 12:01

## 2025-01-14 RX ADMIN — HYDROCODONE BITARTRATE AND ACETAMINOPHEN 1 TABLET: 5; 325 TABLET ORAL at 01:01

## 2025-01-14 NOTE — PROGRESS NOTES
No physical therapy treatment today secondary to patient presents with multiple reports of diarrhea and feeling ill. Skilled nursing care confirmed.  Continue Plan of Care per PT order as patient is able. PERLA Rosenthal 1/14/2025

## 2025-01-14 NOTE — SUBJECTIVE & OBJECTIVE
Interval History: now with diarrhea. Orders revised.    Review of Systems  Objective:     Vital Signs (Most Recent):  Temp: 97.7 °F (36.5 °C) (01/14/25 0750)  Pulse: 92 (01/14/25 0750)  Resp: 18 (01/14/25 0750)  BP: (!) 105/59 (01/14/25 0750)  SpO2: 95 % (01/14/25 0750) Vital Signs (24h Range):  Temp:  [97.7 °F (36.5 °C)-98.3 °F (36.8 °C)] 97.7 °F (36.5 °C)  Pulse:  [85-96] 92  Resp:  [17-18] 18  SpO2:  [95 %-97 %] 95 %  BP: (105-149)/(59-77) 105/59     Weight: 39.7 kg (87 lb 9.6 oz)  Body mass index is 14.14 kg/m².    Intake/Output Summary (Last 24 hours) at 1/14/2025 0904  Last data filed at 1/13/2025 1159  Gross per 24 hour   Intake 240 ml   Output --   Net 240 ml         Physical Exam        Significant Labs: All pertinent labs within the past 24 hours have been reviewed.    Significant Imaging: I have reviewed all pertinent imaging results/findings within the past 24 hours.

## 2025-01-14 NOTE — PLAN OF CARE
Problem: Adult Inpatient Plan of Care  Goal: Plan of Care Review  Outcome: Progressing  Goal: Patient-Specific Goal (Individualized)  Outcome: Progressing  Goal: Absence of Hospital-Acquired Illness or Injury  Outcome: Progressing  Goal: Optimal Comfort and Wellbeing  Outcome: Progressing  Goal: Readiness for Transition of Care  Outcome: Progressing     Problem: Skin Injury Risk Increased  Goal: Skin Health and Integrity  Outcome: Progressing     Problem: Fall Injury Risk  Goal: Absence of Fall and Fall-Related Injury  Outcome: Progressing     Problem: Wound  Goal: Optimal Coping  Outcome: Progressing  Goal: Optimal Functional Ability  Outcome: Progressing  Goal: Absence of Infection Signs and Symptoms  Outcome: Progressing  Goal: Improved Oral Intake  Outcome: Progressing  Goal: Optimal Pain Control and Function  Outcome: Progressing  Goal: Skin Health and Integrity  Outcome: Progressing  Goal: Optimal Wound Healing  Outcome: Progressing     Problem: Communication Impairment  Goal: Effective Communication Skills  Outcome: Progressing     Problem: Pain Acute  Goal: Optimal Pain Control and Function  Outcome: Progressing

## 2025-01-14 NOTE — PROGRESS NOTES
[facility]  Hospital Medicine  Progress Note    Patient Name: Vy Hussein  MRN: [id number]  Patient Class: IP- Swing   Admission Date: 12/24/2024  Length of Stay: 21 days  Attending Physician: Elizabeth Castro MD  Primary Care Provider: Flora Brennan MD        Subjective     Principal Problem:Muscular weakness        HPI:  No notes on file    Overview/Hospital Course:  12/27/24 - up in chair. No complaints voiced.    12/30/24 - doing well today. No complaints voiced.    1/3/25 - has a cough this AM. Has cough syrup ordered. Will continue present treatment.    1/6/25 - doing well. To have staples removed today.    1/10/25 - doing well. Will continue present treatment.    1/13/25 - doing well. Will continue present treatment.    1/14/25 - had 3 diarrheal stools last night. Orders revised.    Interval History: now with diarrhea. Orders revised.    Review of Systems  Objective:     Vital Signs (Most Recent):  Temp: 97.7 °F (36.5 °C) (01/14/25 0750)  Pulse: 92 (01/14/25 0750)  Resp: 18 (01/14/25 0750)  BP: (!) 105/59 (01/14/25 0750)  SpO2: 95 % (01/14/25 0750) Vital Signs (24h Range):  Temp:  [97.7 °F (36.5 °C)-98.3 °F (36.8 °C)] 97.7 °F (36.5 °C)  Pulse:  [85-96] 92  Resp:  [17-18] 18  SpO2:  [95 %-97 %] 95 %  BP: (105-149)/(59-77) 105/59     Weight: 39.7 kg (87 lb 9.6 oz)  Body mass index is 14.14 kg/m².    Intake/Output Summary (Last 24 hours) at 1/14/2025 0904  Last data filed at 1/13/2025 1159  Gross per 24 hour   Intake 240 ml   Output --   Net 240 ml         Physical Exam        Significant Labs: All pertinent labs within the past 24 hours have been reviewed.    Significant Imaging: I have reviewed all pertinent imaging results/findings within the past 24 hours.    Assessment and Plan     No notes have been filed under this hospital service.  Service: Hospital Medicine    VTE Risk Mitigation (From admission, onward)           Ordered     apixaban tablet 2.5 mg  2  times daily         12/25/24 1056     IP VTE HIGH RISK PATIENT  Once         12/24/24 1544                    Discharge Planning   REESE:      Code Status: Prior   Medical Readiness for Discharge Date:   Discharge Plan A: Home, Home Health                Please place Justification for DME        Ema Chamorro MD  Department of Hospital Medicine   [facility]

## 2025-01-14 NOTE — PROGRESS NOTES
Patient has had increased diarrhea yesterday and today and has a low grade fever. CELSO Arteaga, voiced she did not need to do therapy today. Will re-check tomorrow.

## 2025-01-15 LAB
ANION GAP SERPL CALCULATED.3IONS-SCNC: 12 MMOL/L (ref 7–16)
BASOPHILS # BLD AUTO: 0.03 K/UL (ref 0–0.2)
BASOPHILS NFR BLD AUTO: 0.4 % (ref 0–1)
BUN SERPL-MCNC: 24 MG/DL (ref 10–20)
BUN/CREAT SERPL: 32 (ref 6–20)
CALCIUM SERPL-MCNC: 9.1 MG/DL (ref 8.4–10.2)
CHLORIDE SERPL-SCNC: 105 MMOL/L (ref 98–107)
CO2 SERPL-SCNC: 27 MMOL/L (ref 23–31)
CREAT SERPL-MCNC: 0.74 MG/DL (ref 0.55–1.02)
DIFFERENTIAL METHOD BLD: ABNORMAL
EGFR (NO RACE VARIABLE) (RUSH/TITUS): 80 ML/MIN/1.73M2
EOSINOPHIL # BLD AUTO: 0.06 K/UL (ref 0–0.5)
EOSINOPHIL NFR BLD AUTO: 0.9 % (ref 1–4)
ERYTHROCYTE [DISTWIDTH] IN BLOOD BY AUTOMATED COUNT: 15.9 % (ref 11.5–14.5)
GLUCOSE SERPL-MCNC: 81 MG/DL (ref 82–115)
HCT VFR BLD AUTO: 36.4 % (ref 38–47)
HGB BLD-MCNC: 11.7 G/DL (ref 12–16)
IMM GRANULOCYTES # BLD AUTO: 0.03 K/UL (ref 0–0.04)
IMM GRANULOCYTES NFR BLD: 0.4 % (ref 0–0.4)
LYMPHOCYTES # BLD AUTO: 0.85 K/UL (ref 1–4.8)
LYMPHOCYTES NFR BLD AUTO: 12.7 % (ref 27–41)
MCH RBC QN AUTO: 30.2 PG (ref 27–31)
MCHC RBC AUTO-ENTMCNC: 32.1 G/DL (ref 32–36)
MCV RBC AUTO: 93.8 FL (ref 80–96)
MONOCYTES # BLD AUTO: 0.55 K/UL (ref 0–0.8)
MONOCYTES NFR BLD AUTO: 8.2 % (ref 2–6)
MPC BLD CALC-MCNC: 9.8 FL (ref 9.4–12.4)
NEUTROPHILS # BLD AUTO: 5.17 K/UL (ref 1.8–7.7)
NEUTROPHILS NFR BLD AUTO: 77.4 % (ref 53–65)
NRBC # BLD AUTO: 0 X10E3/UL
NRBC, AUTO (.00): 0 %
PLATELET # BLD AUTO: 207 K/UL (ref 150–400)
POTASSIUM SERPL-SCNC: 3.5 MMOL/L (ref 3.5–5.1)
RBC # BLD AUTO: 3.88 M/UL (ref 4.2–5.4)
SODIUM SERPL-SCNC: 140 MMOL/L (ref 136–145)
WBC # BLD AUTO: 6.69 K/UL (ref 4.5–11)

## 2025-01-15 PROCEDURE — 25000003 PHARM REV CODE 250: Performed by: FAMILY MEDICINE

## 2025-01-15 PROCEDURE — 80048 BASIC METABOLIC PNL TOTAL CA: CPT | Performed by: OBSTETRICS & GYNECOLOGY

## 2025-01-15 PROCEDURE — 11000004 HC SNF PRIVATE

## 2025-01-15 PROCEDURE — 99308 SBSQ NF CARE LOW MDM 20: CPT | Mod: ,,, | Performed by: FAMILY MEDICINE

## 2025-01-15 PROCEDURE — 85025 COMPLETE CBC W/AUTO DIFF WBC: CPT | Performed by: OBSTETRICS & GYNECOLOGY

## 2025-01-15 PROCEDURE — 94761 N-INVAS EAR/PLS OXIMETRY MLT: CPT

## 2025-01-15 PROCEDURE — 36415 COLL VENOUS BLD VENIPUNCTURE: CPT | Performed by: OBSTETRICS & GYNECOLOGY

## 2025-01-15 RX ORDER — CHOLESTYRAMINE 4 G/4.8G
1 POWDER, FOR SUSPENSION ORAL 2 TIMES DAILY
Status: DISCONTINUED | OUTPATIENT
Start: 2025-01-15 | End: 2025-01-20 | Stop reason: ALTCHOICE

## 2025-01-15 RX ADMIN — THERA TABS 1 TABLET: TAB at 08:01

## 2025-01-15 RX ADMIN — CHOLESTYRAMINE LIGHT 4 GRAMS OF ANHYDROUS CHOLESTYRAMINE: 4 POWDER, FOR SUSPENSION ORAL at 10:01

## 2025-01-15 RX ADMIN — ATORVASTATIN CALCIUM 20 MG: 20 TABLET, FILM COATED ORAL at 08:01

## 2025-01-15 RX ADMIN — HYDROCORTISONE: 25 CREAM TOPICAL at 09:01

## 2025-01-15 RX ADMIN — DONEPEZIL HYDROCHLORIDE 5 MG: 5 TABLET ORAL at 08:01

## 2025-01-15 RX ADMIN — ASPIRIN 81 MG CHEWABLE TABLET 81 MG: 81 TABLET CHEWABLE at 08:01

## 2025-01-15 RX ADMIN — APIXABAN 2.5 MG: 2.5 TABLET, FILM COATED ORAL at 08:01

## 2025-01-15 RX ADMIN — CHOLESTYRAMINE LIGHT 4 GRAMS OF ANHYDROUS CHOLESTYRAMINE: 4 POWDER, FOR SUSPENSION ORAL at 09:01

## 2025-01-15 NOTE — SUBJECTIVE & OBJECTIVE
Interval History: orders revised.    Review of Systems  Objective:     Vital Signs (Most Recent):  Temp: 98.2 °F (36.8 °C) (01/14/25 2124)  Pulse: 82 (01/15/25 0752)  Resp: 18 (01/15/25 0752)  BP: (!) 101/53 (01/14/25 2124)  SpO2: 97 % (01/15/25 0752) Vital Signs (24h Range):  Temp:  [98.2 °F (36.8 °C)-99.3 °F (37.4 °C)] 98.2 °F (36.8 °C)  Pulse:  [] 82  Resp:  [17-18] 18  SpO2:  [95 %-97 %] 97 %  BP: (101-105)/(53-63) 101/53     Weight: 39.7 kg (87 lb 9.6 oz)  Body mass index is 14.14 kg/m².    Intake/Output Summary (Last 24 hours) at 1/15/2025 0849  Last data filed at 1/14/2025 1154  Gross per 24 hour   Intake 360 ml   Output --   Net 360 ml         Physical Exam        Significant Labs: All pertinent labs within the past 24 hours have been reviewed.    Significant Imaging: I have reviewed all pertinent imaging results/findings within the past 24 hours.

## 2025-01-15 NOTE — PROGRESS NOTES
No physical therapy treatment session today secondary to patient with frequent diarrhea.  Continue Plan of Care per PT order to progress patient toward rehab goals as tolerated by patient.  PERLA Rosenthal 1/15/2025

## 2025-01-15 NOTE — PLAN OF CARE
Problem: Adult Inpatient Plan of Care  Goal: Optimal Comfort and Wellbeing  Outcome: Progressing  Intervention: Monitor Pain and Promote Comfort  Flowsheets (Taken 1/15/2025 1525)  Pain Management Interventions:   care clustered   pillow support provided   position adjusted  Intervention: Provide Person-Centered Care  Flowsheets (Taken 1/15/2025 1525)  Trust Relationship/Rapport:   care explained   choices provided   emotional support provided   empathic listening provided   questions answered   questions encouraged   reassurance provided   thoughts/feelings acknowledged     Problem: Fall Injury Risk  Goal: Absence of Fall and Fall-Related Injury  Outcome: Progressing  Intervention: Identify and Manage Contributors  Flowsheets (Taken 1/15/2025 1525)  Self-Care Promotion:   independence encouraged   BADL personal objects within reach   BADL personal routines maintained   meal set-up provided   adaptive equipment use encouraged  Medication Review/Management:   medications reviewed   high-risk medications identified  Intervention: Promote Injury-Free Environment  Flowsheets (Taken 1/15/2025 1525)  Safety Promotion/Fall Prevention:   assistive device/personal item within reach   bed alarm set   Fall Risk signage in place   Fall Risk reviewed with patient/family   high risk medications identified   instructed to call staff for mobility   medications reviewed   nonskid shoes/socks when out of bed   room near unit station

## 2025-01-15 NOTE — NURSING
Pt not feeling good this am. Diarrhea noted.  has given new orders.  Recommended for pt not to participate in therapy this am.

## 2025-01-15 NOTE — PROGRESS NOTES
[facility]  Hospital Medicine  Progress Note    Patient Name: Vy Hussein  MRN: [id number]  Patient Class: IP- Swing   Admission Date: 12/24/2024  Length of Stay: 22 days  Attending Physician: Elizabeth Castro MD  Primary Care Provider: Flora Brennan MD        Subjective     Principal Problem:Muscular weakness        HPI:  No notes on file    Overview/Hospital Course:  12/27/24 - up in chair. No complaints voiced.    12/30/24 - doing well today. No complaints voiced.    1/3/25 - has a cough this AM. Has cough syrup ordered. Will continue present treatment.    1/6/25 - doing well. To have staples removed today.    1/10/25 - doing well. Will continue present treatment.    1/13/25 - doing well. Will continue present treatment.    1/14/25 - had 3 diarrheal stools last night. Orders revised.    1/15/25 - still with diarrhea. Orders revised.    Interval History: orders revised.    Review of Systems  Objective:     Vital Signs (Most Recent):  Temp: 98.2 °F (36.8 °C) (01/14/25 2124)  Pulse: 82 (01/15/25 0752)  Resp: 18 (01/15/25 0752)  BP: (!) 101/53 (01/14/25 2124)  SpO2: 97 % (01/15/25 0752) Vital Signs (24h Range):  Temp:  [98.2 °F (36.8 °C)-99.3 °F (37.4 °C)] 98.2 °F (36.8 °C)  Pulse:  [] 82  Resp:  [17-18] 18  SpO2:  [95 %-97 %] 97 %  BP: (101-105)/(53-63) 101/53     Weight: 39.7 kg (87 lb 9.6 oz)  Body mass index is 14.14 kg/m².    Intake/Output Summary (Last 24 hours) at 1/15/2025 0867  Last data filed at 1/14/2025 1154  Gross per 24 hour   Intake 360 ml   Output --   Net 360 ml         Physical Exam        Significant Labs: All pertinent labs within the past 24 hours have been reviewed.    Significant Imaging: I have reviewed all pertinent imaging results/findings within the past 24 hours.    Assessment and Plan     No notes have been filed under this hospital service.  Service: Hospital Medicine    VTE Risk Mitigation (From admission, onward)           Ordered      apixaban tablet 2.5 mg  2 times daily         12/25/24 1056     IP VTE HIGH RISK PATIENT  Once         12/24/24 1544                    Discharge Planning   REESE:      Code Status: Prior   Medical Readiness for Discharge Date:   Discharge Plan A: Home, Home Health                Please place Justification for DME        Ema Chamorro MD  Department of Hospital Medicine   [facility]

## 2025-01-15 NOTE — PROGRESS NOTES
Patient is still have increased stools this date. Nurse, KHLOE Domingo recommended patient not do therapy.

## 2025-01-16 LAB
ANION GAP SERPL CALCULATED.3IONS-SCNC: 8 MMOL/L (ref 7–16)
BASOPHILS # BLD AUTO: 0.02 K/UL (ref 0–0.2)
BASOPHILS NFR BLD AUTO: 0.3 % (ref 0–1)
BUN SERPL-MCNC: 18 MG/DL (ref 10–20)
BUN/CREAT SERPL: 30 (ref 6–20)
CALCIUM SERPL-MCNC: 8.8 MG/DL (ref 8.4–10.2)
CHLORIDE SERPL-SCNC: 107 MMOL/L (ref 98–107)
CO2 SERPL-SCNC: 27 MMOL/L (ref 23–31)
CREAT SERPL-MCNC: 0.61 MG/DL (ref 0.55–1.02)
DIFFERENTIAL METHOD BLD: ABNORMAL
EGFR (NO RACE VARIABLE) (RUSH/TITUS): 88 ML/MIN/1.73M2
EOSINOPHIL # BLD AUTO: 0.2 K/UL (ref 0–0.5)
EOSINOPHIL NFR BLD AUTO: 3.1 % (ref 1–4)
ERYTHROCYTE [DISTWIDTH] IN BLOOD BY AUTOMATED COUNT: 15.6 % (ref 11.5–14.5)
GLUCOSE SERPL-MCNC: 87 MG/DL (ref 82–115)
HCT VFR BLD AUTO: 32.2 % (ref 38–47)
HGB BLD-MCNC: 10.4 G/DL (ref 12–16)
IMM GRANULOCYTES # BLD AUTO: 0.02 K/UL (ref 0–0.04)
IMM GRANULOCYTES NFR BLD: 0.3 % (ref 0–0.4)
LYMPHOCYTES # BLD AUTO: 0.93 K/UL (ref 1–4.8)
LYMPHOCYTES NFR BLD AUTO: 14.5 % (ref 27–41)
MAGNESIUM SERPL-MCNC: 2 MG/DL (ref 1.6–2.6)
MCH RBC QN AUTO: 29.9 PG (ref 27–31)
MCHC RBC AUTO-ENTMCNC: 32.3 G/DL (ref 32–36)
MCV RBC AUTO: 92.5 FL (ref 80–96)
MONOCYTES # BLD AUTO: 0.58 K/UL (ref 0–0.8)
MONOCYTES NFR BLD AUTO: 9 % (ref 2–6)
MPC BLD CALC-MCNC: 10.2 FL (ref 9.4–12.4)
NEUTROPHILS # BLD AUTO: 4.67 K/UL (ref 1.8–7.7)
NEUTROPHILS NFR BLD AUTO: 72.8 % (ref 53–65)
NRBC # BLD AUTO: 0 X10E3/UL
NRBC, AUTO (.00): 0 %
PHOSPHATE SERPL-MCNC: 2.6 MG/DL (ref 2.3–4.7)
PLATELET # BLD AUTO: 196 K/UL (ref 150–400)
POTASSIUM SERPL-SCNC: 3.2 MMOL/L (ref 3.5–5.1)
RBC # BLD AUTO: 3.48 M/UL (ref 4.2–5.4)
SODIUM SERPL-SCNC: 139 MMOL/L (ref 136–145)
WBC # BLD AUTO: 6.42 K/UL (ref 4.5–11)

## 2025-01-16 PROCEDURE — 25000003 PHARM REV CODE 250: Performed by: FAMILY MEDICINE

## 2025-01-16 PROCEDURE — 83735 ASSAY OF MAGNESIUM: CPT | Performed by: FAMILY MEDICINE

## 2025-01-16 PROCEDURE — 80048 BASIC METABOLIC PNL TOTAL CA: CPT | Performed by: FAMILY MEDICINE

## 2025-01-16 PROCEDURE — 85025 COMPLETE CBC W/AUTO DIFF WBC: CPT | Performed by: FAMILY MEDICINE

## 2025-01-16 PROCEDURE — 36415 COLL VENOUS BLD VENIPUNCTURE: CPT | Performed by: FAMILY MEDICINE

## 2025-01-16 PROCEDURE — 84100 ASSAY OF PHOSPHORUS: CPT | Performed by: FAMILY MEDICINE

## 2025-01-16 PROCEDURE — 11000004 HC SNF PRIVATE

## 2025-01-16 PROCEDURE — 97110 THERAPEUTIC EXERCISES: CPT

## 2025-01-16 PROCEDURE — 94761 N-INVAS EAR/PLS OXIMETRY MLT: CPT

## 2025-01-16 PROCEDURE — 97530 THERAPEUTIC ACTIVITIES: CPT | Mod: CQ

## 2025-01-16 RX ADMIN — ASPIRIN 81 MG CHEWABLE TABLET 81 MG: 81 TABLET CHEWABLE at 08:01

## 2025-01-16 RX ADMIN — APIXABAN 2.5 MG: 2.5 TABLET, FILM COATED ORAL at 08:01

## 2025-01-16 RX ADMIN — THERA TABS 1 TABLET: TAB at 08:01

## 2025-01-16 RX ADMIN — DONEPEZIL HYDROCHLORIDE 5 MG: 5 TABLET ORAL at 08:01

## 2025-01-16 RX ADMIN — HYDROCORTISONE: 25 CREAM TOPICAL at 08:01

## 2025-01-16 RX ADMIN — CHOLESTYRAMINE LIGHT 4 GRAMS OF ANHYDROUS CHOLESTYRAMINE: 4 POWDER, FOR SUSPENSION ORAL at 08:01

## 2025-01-16 RX ADMIN — ATORVASTATIN CALCIUM 20 MG: 20 TABLET, FILM COATED ORAL at 08:01

## 2025-01-16 RX ADMIN — CHOLESTYRAMINE LIGHT 4 GRAMS OF ANHYDROUS CHOLESTYRAMINE: 4 POWDER, FOR SUSPENSION ORAL at 09:01

## 2025-01-16 NOTE — PLAN OF CARE
[facility] - Swing Bed   Interdisciplinary Team Meeting    Patient: Vy Hussein   Today's Date: 1/16/2025   Estimated D/C Date:      Physician: Ema Chamorro MD Unit Director: Alexa Sood RN   Pharmacy: Tawnya Haro, PharmD Nursing: JAZZY Sweeney RN   : Nicyk Magana RN Physical/Occupational Therapy: Lizeth Negron OT, VIPIN San PT   Speech Therapy: ST Mendoza Respiratory: See respiratory notes   Dietary: See dietary notes    Other: ELIZABETH Can, RT      Nursing  New Symptoms/Problems: none at this time                                       Urine: incontinent  Judd: No   Bowel: continent  Last Bowel Movement: 01/16/25   Constipated: No  Diarrhea: yes   Isolation: No  Cognition: WNL  Aspiration Precautions: No  Wound Care: No  Wound Location/Tx: na  Comment(s): na      Respiratory  O2 Device: Room Air  O2 Flow: na  SpO2: 95%  Neb Tx: No  Comment(s): na      Dietary  Nutrition:  mechanical soft  Comment(s): na     Speech Therapy  Speech/Swallowing: Speech difficulty  Comment(s): expressive aphasia     Physical Therapy  Gait/Assistive Device:   130' ft with rolling walker with CGA and verbal cue to look ahead when walking    ELOS: Plan to DC tbd    Transfers: Moderate Assistance  Bed Mobility: Activity did not occur Range of Motion/Restrictions: RLE weight bearing as tolerated   Comment(s): na                 Occupational Therapy  Eating/Grooming: Supervision or Set-up Assistance Toileting: Minimal Assistance   Bathing: Minimal Assistance Dressing (Upper Body): Minimal Assistance   Dressing (Lower Body): Moderate Assistance    Comment(s): na   Activity Therapy  Level of participation: Active participation  Comment(s): na     Pharmacy  Medication Changes: No  Labs Reviewed: Yes  New Lab Orders: No  Comment(s): labs q mon/thurs       Tx Plan/Recommendations reviewed with family and/or patient on 1/7/25.  Additional family  Conference/Training: adam  D/C Plan/Recommendations: Home with HH and Home with family  REESE: 1/16/2025  Comment(s): adam

## 2025-01-16 NOTE — PT/OT/SLP PROGRESS
Occupational Therapy   Treatment    Name: Vy Hussein  MRN: [id number]  Admitting Diagnosis:  Muscular weakness       Recommendations:     Discharge Recommendations: Moderate Intensity Therapy  Discharge Equipment Recommendations:  3-in-1 commode, walker, rolling  Barriers to discharge:       Assessment:     Vy Hussein is a 84 y.o. female with a medical diagnosis of Muscular weakness. Performance deficits affecting function are weakness, impaired endurance, impaired self care skills, impaired functional mobility, impaired balance, decreased lower extremity function, decreased safety awareness.       Patient is very weak following stomach virus. Patient agreed to attempt therapy but struggled through the few exercises done. Patient was then put back to bed.     Rehab Prognosis:  Good; patient would benefit from acute skilled OT services to address these deficits and reach maximum level of function.       Plan:     Patient to be seen 5 x/week to address the above listed problems via therapeutic exercises, therapeutic activities, self-care/home management  Plan of Care Expires:    Plan of Care Reviewed with: patient    Subjective     Chief Complaint: Decreased mobility  Patient/Family Comments/goals: increased strength  Pain/Comfort:  Pain Rating 1: 0/10    Objective:     Communicated with: RN prior to session.  Patient found up in chair with   upon OT entry to room. Patient treated in room on EOB due to being sick    General Precautions: Standard, fall    Orthopedic Precautions:   Braces:    Respiratory Status: Room air     Occupational Performance:       Functional Mobility/Transfers:  Patient completed Sit <> Stand Transfer with minimum assistance  with  rolling walker   Functional Mobility: min a with ROLLING WALKER and extra time needed    Activities of Daily Living:  Feeding S/u   Grooming S/u   Bathing Min a   UB dsg Min a   LB dsg Mod a   Toileting S/u   Toilet t/f Min a         AMPAC 6 Click ADL:  16    Treatment & Education:  Pt educated on OT role/POC.   Importance of OOB activity with staff assistance.  Importance of sitting up in the chair throughout the day as tolerated, especially for meals   Safety during functional t/f and mobility  Importance of assisting with self-care activities     Patient completed the following for increased strength to increase I with ADLs: 3# dowel- shoulder press x 40, yellow theraflex x 40 both ways      Patient left up in chair with call button in reach and chair alarm on    GOALS:   Multidisciplinary Problems       Occupational Therapy Goals          Problem: Occupational Therapy    Goal Priority Disciplines Outcome Interventions   Occupational Therapy Goal     OT, PT/OT Progressing    Description: STG:  Pt will perform grooming with setup  Pt will bathe with Mod I  Pt will perform UE dressing with I after set up  Pt will perform LE dressing with Mod I  Pt will sit EOB x 15 min with no assistance  Pt will transfer bed/chair/bsc with Mod I  Pt will perform standing task x 15 min with supervision assistance  Pt will tolerate 45 minutes of tx without fatigue      LT.Restore to max I with self care and mobility.                         Time Tracking:     OT Date of Treatment: 25  OT Start Time: 1033  OT Stop Time: 1049  OT Total Time (min): 16 min    Billable Minutes:Therapeutic Exercise 16 min  Lizeth Negron OTR/L            Number of PHILIP visits since last OT visit: 2025

## 2025-01-16 NOTE — PT/OT/SLP PROGRESS
Physical Therapy Treatment    Patient Name:  Vy Hussein   MRN:  [id number]    Recommendations:     Discharge Recommendations: Moderate Intensity Therapy  Discharge Equipment Recommendations: 3-in-1 commode, walker, rolling  Barriers to discharge: None    Assessment:     Vy Hussein is a 84 y.o. female admitted with a medical diagnosis of Muscular weakness.  She presents with the following impairments/functional limitations: weakness.  Required frequent rest breaks; was also limited by a coughing spell after taking a sip of her water.    Rehab Prognosis: Good and Fair; patient would benefit from acute skilled PT services to address these deficits and reach maximum level of function.    Recent Surgery: * No surgery found *      Plan:     During this hospitalization, patient to be seen 5 x/week to address the identified rehab impairments via gait training, therapeutic exercises and progress toward the following goals:    Plan of Care Expires:  01/14/25    Continue per Plan of Care per Florinda San PT    Subjective     Chief Complaint: weak, don't know where I got that bug from  Patient/Family Comments/goals: I'll try (therapy)  Pain/Comfort:  Pain Rating 1: 0/10      Objective:     Communicated with patient, OT prior to session.  Patient found sitting edge of bed with chair check upon PT entry to room.     General Precautions: Standard, fall  Orthopedic Precautions: RLE weight bearing as tolerated  Braces: N/A  Respiratory Status: Room air     Functional Mobility:  Bed Mobility:     Sit to Supine: minimum assistance  Transfers:     Sit to Stand:  contact guard assistance with rolling walker  Gait: 50ft with rolling walker and CGA; slow, guarded      AM-PAC 6 CLICK MOBILITY  Turning over in bed (including adjusting bedclothes, sheets and blankets)?: 3  Sitting down on and standing up from a chair with arms (e.g., wheelchair, bedside commode, etc.): 3  Moving from lying on back to sitting on the side of the  bed?: 3  Moving to and from a bed to a chair (including a wheelchair)?: 3  Need to walk in hospital room?: 3  Climbing 3-5 steps with a railing?: 3  Basic Mobility Total Score: 18       Treatment & Education:  Sitting BLE exercises x 10-15 repetitions each: marching, long arc quads, hip abduction, ankle rocking  Sit to stand x 10 repetitions, slow and guarded  Assisted pt back into bed to rest    Patient left HOB elevated with call button in reach..    GOALS:   Multidisciplinary Problems       Physical Therapy Goals          Problem: Physical Therapy    Goal Priority Disciplines Outcome Interventions   Physical Therapy Goal     PT, PT/OT     Description: Short term goals  1. Pt will require min assist for bed mobility  2. Pt will require min assist for sit to stand transfer   3. Pt will ambulate 25 feet with RW and CGA    Long term goals  1. Pt will be independent with all bed mobility  2. Pt will be independent/mod independent with sit to stand transfer  3. Pt will perform 5 sit to stands with only 1 UE for assistance  4. Pt will ambulate 50 feet with RW and CGA  5. Pt will be independent with ascending and descending 2 standard height steps with handrail                       Time Tracking:     PT Received On: 01/16/25  PT Start Time: 0815     PT Stop Time: 0835  PT Total Time (min): 20 min     Billable Minutes: Therapeutic Activity 20    Treatment Type: Treatment  PT/PTA: PTA     Number of PTA visits since last PT visit: 3     01/16/2025

## 2025-01-16 NOTE — PLAN OF CARE
Problem: Adult Inpatient Plan of Care  Goal: Patient-Specific Goal (Individualized)  Outcome: Progressing     Problem: Infection  Goal: Absence of Infection Signs and Symptoms  Outcome: Progressing  Intervention: Prevent or Manage Infection  Flowsheets (Taken 1/16/2025 1600)  Infection Management: aseptic technique maintained     Problem: Wound  Goal: Absence of Infection Signs and Symptoms  Outcome: Progressing  Intervention: Prevent or Manage Infection  Flowsheets (Taken 1/16/2025 1600)  Infection Management: aseptic technique maintained

## 2025-01-17 PROCEDURE — 97110 THERAPEUTIC EXERCISES: CPT

## 2025-01-17 PROCEDURE — 94761 N-INVAS EAR/PLS OXIMETRY MLT: CPT

## 2025-01-17 PROCEDURE — 97110 THERAPEUTIC EXERCISES: CPT | Mod: CQ

## 2025-01-17 PROCEDURE — 25000003 PHARM REV CODE 250: Performed by: FAMILY MEDICINE

## 2025-01-17 PROCEDURE — 11000004 HC SNF PRIVATE

## 2025-01-17 PROCEDURE — 92507 TX SP LANG VOICE COMM INDIV: CPT

## 2025-01-17 RX ADMIN — ATORVASTATIN CALCIUM 20 MG: 20 TABLET, FILM COATED ORAL at 09:01

## 2025-01-17 RX ADMIN — ASPIRIN 81 MG CHEWABLE TABLET 81 MG: 81 TABLET CHEWABLE at 09:01

## 2025-01-17 RX ADMIN — HYDROCORTISONE: 25 CREAM TOPICAL at 09:01

## 2025-01-17 RX ADMIN — APIXABAN 2.5 MG: 2.5 TABLET, FILM COATED ORAL at 09:01

## 2025-01-17 RX ADMIN — CHOLESTYRAMINE LIGHT 4 GRAMS OF ANHYDROUS CHOLESTYRAMINE: 4 POWDER, FOR SUSPENSION ORAL at 08:01

## 2025-01-17 RX ADMIN — THERA TABS 1 TABLET: TAB at 09:01

## 2025-01-17 RX ADMIN — DONEPEZIL HYDROCHLORIDE 5 MG: 5 TABLET ORAL at 09:01

## 2025-01-17 RX ADMIN — CHOLESTYRAMINE LIGHT 4 GRAMS OF ANHYDROUS CHOLESTYRAMINE: 4 POWDER, FOR SUSPENSION ORAL at 09:01

## 2025-01-17 NOTE — PT/OT/SLP PROGRESS
Occupational Therapy   Treatment    Name: Vy Hussein  MRN: [id number]  Admitting Diagnosis:  Muscular weakness       Recommendations:     Discharge Recommendations: Moderate Intensity Therapy  Discharge Equipment Recommendations:  3-in-1 commode, walker, rolling  Barriers to discharge:       Assessment:     Vy Hussein is a 84 y.o. female with a medical diagnosis of Muscular weakness. Performance deficits affecting function are weakness, impaired endurance, impaired self care skills, impaired functional mobility, impaired balance, decreased lower extremity function, decreased safety awareness.       Rehab Prognosis:  Good; patient would benefit from acute skilled OT services to address these deficits and reach maximum level of function.       Plan:     Patient to be seen 5 x/week to address the above listed problems via self-care/home management, therapeutic activities, therapeutic exercises  Plan of Care Expires:    Plan of Care Reviewed with: patient    Subjective     Chief Complaint: Decreased mobility  Patient/Family Comments/goals: increased strength  Pain/Comfort:  Pain Rating 1: 0/10    Objective:     Communicated with: RN prior to session.  Patient found up in chair with   upon OT entry to room. Patient treated in room on EOB due to being sick    General Precautions: Standard, fall    Orthopedic Precautions:   Braces:    Respiratory Status: Room air     Occupational Performance:       Functional Mobility/Transfers:  Patient completed Sit <> Stand Transfer with minimum assistance  with  rolling walker   Functional Mobility: min a with ROLLING WALKER and extra time needed    Activities of Daily Living:  Feeding S/u   Grooming S/u   Bathing Min a   UB dsg Min a   LB dsg Mod a   Toileting S/u   Toilet t/f Mod a     Patient ambulated to bathroom with Mod I and completed toileting with Mod I    AMPAC 6 Click ADL: 16    Treatment & Education:  Pt educated on OT role/POC.   Importance of OOB activity with  staff assistance.  Importance of sitting up in the chair throughout the day as tolerated, especially for meals   Safety during functional t/f and mobility  Importance of assisting with self-care activities     Patient completed the following for increased strength to increase I with ADLs: 3# dowel- shoulder press x 40, yellow theraflex x 40 both ways      Patient left up in chair with call button in reach and chair alarm on    GOALS:   Multidisciplinary Problems       Occupational Therapy Goals          Problem: Occupational Therapy    Goal Priority Disciplines Outcome Interventions   Occupational Therapy Goal     OT, PT/OT Progressing    Description: STG:  Pt will perform grooming with setup  Pt will bathe with Mod I  Pt will perform UE dressing with I after set up  Pt will perform LE dressing with Mod I  Pt will sit EOB x 15 min with no assistance  Pt will transfer bed/chair/bsc with Mod I  Pt will perform standing task x 15 min with supervision assistance  Pt will tolerate 45 minutes of tx without fatigue      LT.Restore to max I with self care and mobility.                         Time Tracking:     OT Date of Treatment: 25  OT Start Time: 1257  OT Stop Time: 1315  OT Total Time (min): 18 min    Billable Minutes:Therapeutic Exercise 18 min  Lizeth Negron, OTR/L            Number of PHILIP visits since last OT visit: 2025

## 2025-01-17 NOTE — PT/OT/SLP PROGRESS
"Speech Language Pathology Treatment    Patient Name:  Vy Hussein   MRN:  [id number]  Admitting Diagnosis: Muscular weakness    Recommendations:                 General Recommendations:  Speech/language therapy  Diet recommendations:   ,     Aspiration Precautions:       General Precautions: Standard,    Communication strategies:  provide increased time to answer and go to room if call light pushed    Assessment:     Vy Hussein is a 84 y.o. female with an SLP diagnosis of Aphasia.  She presents with expressive aphasia.    Subjective       Patient goals: Patient will be Independently oriented x4, 5/5 therapy sessions.   Patient answered "wh" questions with 75% accuracy Independently.   Patient completed word finding tasks with 75% accuracy Independently.   Patient completed divergent naming tasks with 75% accuracy Independently.   Patient completed convergent naming tasks with 75% accuracy Independently.   Patient completed problem solving tasks with 80% accuracy Independently.   Patient completed verbal reasoning tasks with 80% accuracy Independently.   Patient completed short term memory tasks with 80% accuracy Independently.      When pt uses slow speech; pt is intelligible.    Pain/Comfort:  Pain Rating 1: 0/10    Respiratory Status: Room air    Objective:     Has the patient been evaluated by SLP for swallowing?   No  Keep patient NPO? No   Current Respiratory Status:            Goals:   Multidisciplinary Problems       SLP Goals       Not on file                    Plan:     Patient to be seen:  3 x/week   Plan of Care expires:  01/31/25  Plan of Care reviewed with:  patient   SLP Follow-Up:  Yes       Discharge recommendations:  Moderate Intensity Therapy   Barriers to Discharge:  Safety Awareness      Time Tracking:     SLP Treatment Date:   01/17/25  Speech Start Time:  1100  Speech Stop Time:  1200     Speech Total Time (min):  60 min    Billable Minutes: Speech Therapy Individual  "     01/17/2025    Jenni Corado MSCCC-SLP

## 2025-01-17 NOTE — PT/OT/SLP PROGRESS
Physical Therapy Treatment    Patient Name:  Vy Hussein   MRN:  [id number]    Recommendations:     Discharge Recommendations: Moderate Intensity Therapy  Discharge Equipment Recommendations: walker, rolling  Barriers to discharge: None    Assessment:     Vy Hussein is a 84 y.o. female admitted with a medical diagnosis of Muscular weakness.  She presents with the following impairments/functional limitations: weakness, impaired endurance, impaired self care skills, impaired functional mobility, gait instability, impaired balance, decreased upper extremity function, decreased lower extremity function, decreased safety awareness, pain, orthopedic precautions.  Patient reports feeling weak, and requires multiple rest breaks throughout PT treatment session.  Patient was able to ambulate back to her room from rehab gym with RW and CGA.      Rehab Prognosis: Good and Fair; patient would benefit from acute skilled PT services to address these deficits and reach maximum level of function.    Recent Surgery: * No surgery found *      Plan:     During this hospitalization, patient to be seen 5 x/week to address the identified rehab impairments via gait training, therapeutic exercises and progress toward the following goals:    Plan of Care Expires:  01/14/25    Continue per Plan of Care per Florinda San PT    Subjective     Chief Complaint: weakness  Patient/Family Comments/goals:   Pain/Comfort:  Pain Rating 1: 0/10      Objective:     Communicated with patient, OT, and skilled nursing prior to session.  Patient found seated in rehab gym following OT treatment.   General Precautions: Standard, fall  Orthopedic Precautions: RLE weight bearing as tolerated  Braces: N/A  Respiratory Status: Room air     Functional Mobility:  Bed Mobility:     Sit to Supine: minimum assistance  Transfers:     Sit to Stand:  contact guard assistance with rolling walker  Gait: 50ft with rolling walker and CGA; slow, guarded      AM-PAC  "6 CLICK MOBILITY  Turning over in bed (including adjusting bedclothes, sheets and blankets)?: 3  Sitting down on and standing up from a chair with arms (e.g., wheelchair, bedside commode, etc.): 3  Moving from lying on back to sitting on the side of the bed?: 3  Moving to and from a bed to a chair (including a wheelchair)?: 3  Need to walk in hospital room?: 3  Climbing 3-5 steps with a railing?: 3  Basic Mobility Total Score: 18       Treatment & Education:  Ther-Ex Reps       Ankle pumps 30 x    Quad sets 30 x 3"   Horizontal Hip Abduction/Hip ADD 2 x 10   Hip ADD 2 x 10   Heelslides    Short Arc Quads'  2 X 10    Hamstring curls    Seated Marching    Hip ABD    Long sitting straight leg raises  10 x each               Patient left HOB elevated with call button in reach..    GOALS:   Multidisciplinary Problems       Physical Therapy Goals          Problem: Physical Therapy    Goal Priority Disciplines Outcome Interventions   Physical Therapy Goal     PT, PT/OT     Description: Short term goals  1. Pt will require min assist for bed mobility  2. Pt will require min assist for sit to stand transfer   3. Pt will ambulate 25 feet with RW and CGA    Long term goals  1. Pt will be independent with all bed mobility  2. Pt will be independent/mod independent with sit to stand transfer  3. Pt will perform 5 sit to stands with only 1 UE for assistance  4. Pt will ambulate 50 feet with RW and CGA  5. Pt will be independent with ascending and descending 2 standard height steps with handrail                       Time Tracking:     PT Received On: 01/17/25  PT Start Time: 1318     PT Stop Time: 1340  PT Total Time (min): 22 min     Billable Minutes:  Therapeutic exercise 22 minutes     Treatment Type: Treatment  PT/PTA: PTA     Number of PTA visits since last PT visit: 4     Continue Plan of Care per PT order to progress patient toward rehab goals as tolerated by patient.   PERLA Rosenthal   01/17/2025  "

## 2025-01-17 NOTE — PLAN OF CARE
Plan of care extended due to pt is making progress with cognitive impairment.    01/17/2025   Jenni Corado MSCCC-SLP

## 2025-01-17 NOTE — SUBJECTIVE & OBJECTIVE
Interval History: orders revised.    Review of Systems  Objective:     Vital Signs (Most Recent):  Temp: 97.4 °F (36.3 °C) (01/17/25 0813)  Pulse: 74 (01/17/25 0813)  Resp: 18 (01/17/25 0813)  BP: 129/73 (01/17/25 0813)  SpO2: 96 % (01/17/25 0813) Vital Signs (24h Range):  Temp:  [97.4 °F (36.3 °C)-97.7 °F (36.5 °C)] 97.4 °F (36.3 °C)  Pulse:  [67-79] 74  Resp:  [18-20] 18  SpO2:  [95 %-97 %] 96 %  BP: (129-154)/(64-73) 129/73     Weight: 39.7 kg (87 lb 9.6 oz)  Body mass index is 14.14 kg/m².    Intake/Output Summary (Last 24 hours) at 1/17/2025 0840  Last data filed at 1/16/2025 1730  Gross per 24 hour   Intake 480 ml   Output --   Net 480 ml         Physical Exam        Significant Labs: All pertinent labs within the past 24 hours have been reviewed.    Significant Imaging: I have reviewed all pertinent imaging results/findings within the past 24 hours.

## 2025-01-17 NOTE — NURSING
Sitting up in bed at this time. NADN. Alert. Stated she felt somewhat nauseated last night but is feeling much better this morning. No needs voiced at this time. Call bell near.

## 2025-01-18 PROCEDURE — 94761 N-INVAS EAR/PLS OXIMETRY MLT: CPT

## 2025-01-18 PROCEDURE — 11000004 HC SNF PRIVATE

## 2025-01-18 PROCEDURE — 25000003 PHARM REV CODE 250: Performed by: FAMILY MEDICINE

## 2025-01-18 RX ADMIN — ATORVASTATIN CALCIUM 20 MG: 20 TABLET, FILM COATED ORAL at 09:01

## 2025-01-18 RX ADMIN — HYDROCORTISONE: 25 CREAM TOPICAL at 08:01

## 2025-01-18 RX ADMIN — APIXABAN 2.5 MG: 2.5 TABLET, FILM COATED ORAL at 09:01

## 2025-01-18 RX ADMIN — THERA TABS 1 TABLET: TAB at 09:01

## 2025-01-18 RX ADMIN — CHOLESTYRAMINE LIGHT 4 GRAMS OF ANHYDROUS CHOLESTYRAMINE: 4 POWDER, FOR SUSPENSION ORAL at 09:01

## 2025-01-18 RX ADMIN — CHOLESTYRAMINE LIGHT 4 GRAMS OF ANHYDROUS CHOLESTYRAMINE: 4 POWDER, FOR SUSPENSION ORAL at 08:01

## 2025-01-18 RX ADMIN — ASPIRIN 81 MG CHEWABLE TABLET 81 MG: 81 TABLET CHEWABLE at 09:01

## 2025-01-18 RX ADMIN — HYDROCORTISONE: 25 CREAM TOPICAL at 09:01

## 2025-01-18 RX ADMIN — APIXABAN 2.5 MG: 2.5 TABLET, FILM COATED ORAL at 08:01

## 2025-01-18 RX ADMIN — DONEPEZIL HYDROCHLORIDE 5 MG: 5 TABLET ORAL at 08:01

## 2025-01-18 NOTE — PLAN OF CARE
Problem: Adult Inpatient Plan of Care  Goal: Plan of Care Review  Outcome: Progressing  Goal: Patient-Specific Goal (Individualized)  Outcome: Progressing  Goal: Optimal Comfort and Wellbeing  Outcome: Progressing     Problem: Skin Injury Risk Increased  Goal: Skin Health and Integrity  Outcome: Progressing  Intervention: Optimize Skin Protection  Flowsheets (Taken 1/18/2025 0405)  Pressure Reduction Techniques: frequent weight shift encouraged  Skin Protection:   incontinence pads utilized   protective footwear used  Activity Management: Ambulated to bathroom - L4  Intervention: Promote and Optimize Oral Intake  Flowsheets (Taken 1/18/2025 0405)  Oral Nutrition Promotion: physical activity promoted     Problem: Fall Injury Risk  Goal: Absence of Fall and Fall-Related Injury  Outcome: Progressing  Intervention: Identify and Manage Contributors  Flowsheets (Taken 1/18/2025 0405)  Self-Care Promotion:   independence encouraged   BADL personal objects within reach   BADL personal routines maintained   adaptive equipment use encouraged  Medication Review/Management: medications reviewed  Intervention: Promote Injury-Free Environment  Flowsheets (Taken 1/18/2025 0405)  Safety Promotion/Fall Prevention:   assistive device/personal item within reach   diversional activities provided   Fall Risk reviewed with patient/family   Fall Risk signage in place   lighting adjusted   medications reviewed   muscle strengthening facilitated   nonskid shoes/socks when out of bed   patient expresses understanding of fall risk and prevention   room near unit station   side rails raised x 2     Problem: Communication Impairment  Goal: Effective Communication Skills  Outcome: Progressing  Intervention: Optimize Communication Skills  Flowsheets (Taken 1/18/2025 0405)  Communication Enhancement Strategies:   call light answered in person   extra time allowed for response

## 2025-01-18 NOTE — PROGRESS NOTES
Sitting up on side of bed eating breakfast. NADN. Alert. Able to voice needs. No requests voiced at this time. No complaints of nausea/diarrhea verbalized. Educated on importance of utilizing call bell before ambulating. Pt verbalized understanding.    01/18/25 0701   Type of Frequent Check   Type Patient Rounds   Safety/Activity   Patient Rounds call light in patient/parent reach;clutter free environment maintained;ID band on;placement of personal items at bedside;toileting offered;visualized patient;bed in low position;bed wheels locked   Safety Promotion/Fall Prevention assistive device/personal item within reach;nonskid shoes/socks when out of bed;in recliner, wheels locked;bed alarm set;Fall Risk reviewed with patient/family;Fall Risk signage in place;patient expresses understanding of fall risk and prevention;side rails raised x 2   Safety Precautions limb precautions maintained   Safety Bands on Patient Fall Risk Band   Activity Management Sitting at edge of bed - L2   Activity Assistance Provided assistance, 1 person   Infection Prevention single patient room provided   Enhanced Safety Measures bed alarm set   Elopement Precautions bed alarm   Positioning   Body Position position changed independently     Call bell near. Will continue to monitor for remainder of shift.

## 2025-01-18 NOTE — PLAN OF CARE
Problem: Adult Inpatient Plan of Care  Goal: Plan of Care Review  Outcome: Progressing  Goal: Patient-Specific Goal (Individualized)  Outcome: Progressing  Goal: Absence of Hospital-Acquired Illness or Injury  Outcome: Progressing  Goal: Optimal Comfort and Wellbeing  Outcome: Progressing  Goal: Readiness for Transition of Care  Outcome: Progressing     Problem: Skin Injury Risk Increased  Goal: Skin Health and Integrity  Outcome: Progressing     Problem: Fall Injury Risk  Goal: Absence of Fall and Fall-Related Injury  Outcome: Progressing     Problem: Infection  Goal: Absence of Infection Signs and Symptoms  Outcome: Progressing     Problem: Communication Impairment  Goal: Effective Communication Skills  Outcome: Progressing     Problem: Pain Acute  Goal: Optimal Pain Control and Function  Outcome: Progressing

## 2025-01-19 PROCEDURE — 25000003 PHARM REV CODE 250: Performed by: FAMILY MEDICINE

## 2025-01-19 PROCEDURE — 97110 THERAPEUTIC EXERCISES: CPT

## 2025-01-19 PROCEDURE — 11000004 HC SNF PRIVATE

## 2025-01-19 PROCEDURE — 92507 TX SP LANG VOICE COMM INDIV: CPT

## 2025-01-19 PROCEDURE — 94761 N-INVAS EAR/PLS OXIMETRY MLT: CPT

## 2025-01-19 RX ADMIN — HYDROCORTISONE: 25 CREAM TOPICAL at 08:01

## 2025-01-19 RX ADMIN — CHOLESTYRAMINE LIGHT 4 GRAMS OF ANHYDROUS CHOLESTYRAMINE: 4 POWDER, FOR SUSPENSION ORAL at 09:01

## 2025-01-19 RX ADMIN — APIXABAN 2.5 MG: 2.5 TABLET, FILM COATED ORAL at 08:01

## 2025-01-19 RX ADMIN — ASPIRIN 81 MG CHEWABLE TABLET 81 MG: 81 TABLET CHEWABLE at 09:01

## 2025-01-19 RX ADMIN — ATORVASTATIN CALCIUM 20 MG: 20 TABLET, FILM COATED ORAL at 09:01

## 2025-01-19 RX ADMIN — THERA TABS 1 TABLET: TAB at 09:01

## 2025-01-19 RX ADMIN — CHOLESTYRAMINE LIGHT 4 GRAMS OF ANHYDROUS CHOLESTYRAMINE: 4 POWDER, FOR SUSPENSION ORAL at 08:01

## 2025-01-19 RX ADMIN — DONEPEZIL HYDROCHLORIDE 5 MG: 5 TABLET ORAL at 08:01

## 2025-01-19 RX ADMIN — HYDROCORTISONE: 25 CREAM TOPICAL at 09:01

## 2025-01-19 RX ADMIN — APIXABAN 2.5 MG: 2.5 TABLET, FILM COATED ORAL at 09:01

## 2025-01-19 NOTE — PLAN OF CARE
Problem: Adult Inpatient Plan of Care  Goal: Plan of Care Review  Outcome: Progressing  Goal: Patient-Specific Goal (Individualized)  Outcome: Progressing  Goal: Optimal Comfort and Wellbeing  Outcome: Progressing     Problem: Skin Injury Risk Increased  Goal: Skin Health and Integrity  Outcome: Progressing  Intervention: Optimize Skin Protection  Flowsheets (Taken 1/19/2025 0427)  Pressure Reduction Techniques: frequent weight shift encouraged  Skin Protection:   incontinence pads utilized   protective footwear used  Activity Management: Ambulated to bathroom - L4  Intervention: Promote and Optimize Oral Intake  Flowsheets (Taken 1/19/2025 0427)  Oral Nutrition Promotion: physical activity promoted     Problem: Fall Injury Risk  Goal: Absence of Fall and Fall-Related Injury  Outcome: Progressing  Intervention: Identify and Manage Contributors  Flowsheets (Taken 1/19/2025 0427)  Self-Care Promotion:   independence encouraged   BADL personal objects within reach   adaptive equipment use encouraged  Medication Review/Management: medications reviewed  Intervention: Promote Injury-Free Environment  Flowsheets (Taken 1/19/2025 0427)  Safety Promotion/Fall Prevention:   assistive device/personal item within reach   diversional activities provided   Fall Risk reviewed with patient/family   Fall Risk signage in place   lighting adjusted   medications reviewed   muscle strengthening facilitated   nonskid shoes/socks when out of bed   patient expresses understanding of fall risk and prevention   room near unit station   side rails raised x 2     Problem: Communication Impairment  Goal: Effective Communication Skills  Outcome: Progressing

## 2025-01-19 NOTE — PT/OT/SLP PROGRESS
Physical Therapy Treatment    Patient Name:  Vy Hussein   MRN:  [id number]    Recommendations:     Discharge Recommendations: Moderate Intensity Therapy  Discharge Equipment Recommendations: walker, rolling  Barriers to discharge: None    Assessment:     Vy Hussein is a 84 y.o. female admitted with a medical diagnosis of Muscular weakness.  She presents with the following impairments/functional limitations: weakness, impaired endurance, impaired functional mobility, gait instability, impaired balance, decreased coordination, decreased lower extremity function, decreased safety awareness.  Patient is motivated to participate in PT treatment. She demonstrated good carryover of exercise from previous sessions requiring only minimal cueing for proper reps and speed. PT provided contact guard for safety during ambulation. No adverse effects noted to therapy tasks.     Rehab Prognosis: Good and Fair; patient would benefit from acute skilled PT services to address these deficits and reach maximum level of function.    Recent Surgery: * No surgery found *      Plan:     During this hospitalization, patient to be seen 5 x/week to address the identified rehab impairments via gait training, therapeutic activities, therapeutic exercises and progress toward the following goals:    Plan of Care Expires:  01/28/25    Subjective     Chief Complaint: weakness  Patient/Family Comments/goals:   Pain/Comfort:  Pain Rating 1: 0/10      Objective:     Communicated with patient and skilled nursing prior to session.  Patient found seated EOB. General Precautions: Standard, fall  Orthopedic Precautions: RLE weight bearing as tolerated  Braces: N/A  Respiratory Status: Room air     Functional Mobility:  Bed Mobility:     Sit to Supine: minimum assistance  Transfers:     Sit to Stand:  contact guard assistance with rolling walker  Gait: 75' with rolling walker and CGA; slow, guarded      AM-PAC 6 CLICK MOBILITY  Turning over in  bed (including adjusting bedclothes, sheets and blankets)?: 4  Sitting down on and standing up from a chair with arms (e.g., wheelchair, bedside commode, etc.): 3  Moving from lying on back to sitting on the side of the bed?: 3  Moving to and from a bed to a chair (including a wheelchair)?: 3  Need to walk in hospital room?: 3  Climbing 3-5 steps with a railing?: 3  Basic Mobility Total Score: 19       Treatment & Education:  Ther-Ex Reps       Ankle pumps 30 x    Quad sets    Horizontal Hip Abduction/Hip ADD    Hip ADD 3 x 10   Heelslides    Short Arc Quads'  3 X 10    Hamstring curls 2 x 10 yellow band each    Seated Marching 2 x 10 each    Hip ABD 3 x 10 yellow    Long sitting straight leg raises  2 x 10  x each               Patient left up in chair with call button in reach.    GOALS:   Multidisciplinary Problems       Physical Therapy Goals          Problem: Physical Therapy    Goal Priority Disciplines Outcome Interventions   Physical Therapy Goal     PT, PT/OT     Description: Short term goals  1. Pt will require min assist for bed mobility  2. Pt will require min assist for sit to stand transfer   3. Pt will ambulate 25 feet with RW and CGA    Long term goals  1. Pt will be independent with all bed mobility  2. Pt will be independent/mod independent with sit to stand transfer  3. Pt will perform 5 sit to stands with only 1 UE for assistance  4. Pt will ambulate 50 feet with RW and CGA  5. Pt will be independent with ascending and descending 2 standard height steps with handrail                       Time Tracking:     PT Received On: 01/19/25  PT Start Time: 1152     PT Stop Time: 1221  PT Total Time (min): 29 min     Billable Minutes:  Therapeutic exercise 22 minutes     Treatment Type: Treatment  PT/PTA: PT     Number of PTA visits since last PT visit: 0     Continue Plan of Care per PT order to progress patient toward rehab goals as tolerated by patient.   Rodriguez San, PT, DPT     01/19/2025

## 2025-01-19 NOTE — PT/OT/SLP PROGRESS
Occupational Therapy   Treatment    Name: Vy Hussein  MRN: [id number]  Admitting Diagnosis:  Muscular weakness       Recommendations:     Discharge Recommendations: Moderate Intensity Therapy  Discharge Equipment Recommendations:  walker, rolling  Barriers to discharge:       Assessment:     Vy Hussein is a 84 y.o. female with a medical diagnosis of Muscular weakness. Performance deficits affecting function are weakness, impaired endurance, impaired self care skills, impaired functional mobility, impaired balance, decreased lower extremity function, decreased safety awareness.       Rehab Prognosis:  Good; patient would benefit from acute skilled OT services to address these deficits and reach maximum level of function.       Plan:     Patient to be seen 5 x/week to address the above listed problems via therapeutic exercises, therapeutic activities, self-care/home management  Plan of Care Expires:    Plan of Care Reviewed with: patient    Subjective     Chief Complaint: Decreased mobility  Patient/Family Comments/goals: increased strength  Pain/Comfort:  Pain Rating 1: 0/10    Objective:     Communicated with: RN prior to session.  Patient found up in chair with   upon OT entry to room.     General Precautions: Standard, fall    Orthopedic Precautions:   Braces:    Respiratory Status: Room air     Occupational Performance:       Functional Mobility/Transfers:  Patient completed Sit <> Stand Transfer with minimum assistance  with  rolling walker   Functional Mobility: min a with ROLLING WALKER and extra time needed    Activities of Daily Living:  Feeding S/u   Grooming S/u   Bathing Min a   UB dsg Min a   LB dsg Mod a   Toileting S/u   Toilet t/f Mod a     Patient ambulated to bathroom with Mod I and completed toileting with Mod I    AMPAC 6 Click ADL: 16    Treatment & Education:  Pt educated on OT role/POC.   Importance of OOB activity with staff assistance.  Importance of sitting up in the chair  throughout the day as tolerated, especially for meals   Safety during functional t/f and mobility  Importance of assisting with self-care activities     Patient completed the following for increased strength to increase I with ADLs: 3# dowel- shoulder press, chest press, bicep curls  x 40      Patient left up in chair with call button in reach and chair alarm on    GOALS:   Multidisciplinary Problems       Occupational Therapy Goals          Problem: Occupational Therapy    Goal Priority Disciplines Outcome Interventions   Occupational Therapy Goal     OT, PT/OT Progressing    Description: STG:  Pt will perform grooming with setup  Pt will bathe with Mod I  Pt will perform UE dressing with I after set up  Pt will perform LE dressing with Mod I  Pt will sit EOB x 15 min with no assistance  Pt will transfer bed/chair/bsc with Mod I  Pt will perform standing task x 15 min with supervision assistance  Pt will tolerate 45 minutes of tx without fatigue      LT.Restore to max I with self care and mobility.                         Time Tracking:     OT Date of Treatment: 25  OT Start Time: 1406  OT Stop Time: 1415  OT Total Time (min): 9 min    Billable Minutes:Therapeutic Exercise 9 min  Lizeth Negron OTR/L            Number of PHILIP visits since last OT visit: 2025

## 2025-01-19 NOTE — PROGRESS NOTES
Sitting up on side of bed. Alert, oriented. NADN. Assessment performed. Stated mouth was very dry. Encouraged to increase fluid intake to assist with dry mouth. No needs voiced at this time.Call bell near. Will continue to monitor.    01/19/25 0460   Type of Frequent Check   Type Patient Rounds   Safety/Activity   Patient Rounds call light in patient/parent reach;clutter free environment maintained;ID band on;placement of personal items at bedside;toileting offered;visualized patient   Safety Promotion/Fall Prevention assistive device/personal item within reach;nonskid shoes/socks when out of bed;in recliner, wheels locked   Safety Precautions limb precautions maintained   Safety Bands on Patient Fall Risk Band   Activity Management Sitting at edge of bed - L2   Activity Assistance Provided independent   Infection Prevention single patient room provided   Positioning   Body Position position changed independently

## 2025-01-19 NOTE — PLAN OF CARE
Patient would benefit from continuing skilled PT to address the deficits detailed in eval.Patient ro continue PT plan of care for three weeks effective 1/14/2025.  Rodriguez San, PT, DPT

## 2025-01-19 NOTE — PLAN OF CARE
Patient would benefit from continuing skilled PT to address the deficits detailed in eval. Patient will conitnue skilled PT services for three weeks effective 1/14/2025.

## 2025-01-19 NOTE — PT/OT/SLP PROGRESS
"Speech Language Pathology Treatment    Patient Name:  Vy Hussein   MRN:  [id number]  Admitting Diagnosis: Muscular weakness    Recommendations:                 General Recommendations:  Speech/language therapy  Diet recommendations:   ,     Aspiration Precautions:       General Precautions: Standard,    Communication strategies:  provide increased time to answer and go to room if call light pushed    Assessment:     Vy Hussein is a 84 y.o. female with an SLP diagnosis of Aphasia.  She presents with expressive aphasia.    Subjective       Patient goals: Patient will be Independently oriented x4, 5/5 therapy sessions.   Patient answered "wh" questions with 75% accuracy Independently.   Patient completed word finding tasks with 75% accuracy Independently.   Patient completed divergent naming tasks with 75% accuracy Independently.   Patient completed convergent naming tasks with 75% accuracy Independently.   Patient completed problem solving tasks with 80% accuracy Independently.   Patient completed verbal reasoning tasks with 80% accuracy Independently.   Patient completed short term memory tasks with 80% accuracy Independently.      When pt uses slow speech; pt is intelligible.    Pain/Comfort:  Pain Rating 1: 0/10  Pain Rating Post-Intervention 1: 0/10    Respiratory Status: Room air    Objective:     Has the patient been evaluated by SLP for swallowing?   No  Keep patient NPO? No   Current Respiratory Status:            Goals:   Multidisciplinary Problems       SLP Goals       Not on file                    Plan:     Patient to be seen:  3 x/week   Plan of Care expires:  01/31/25  Plan of Care reviewed with:  patient   SLP Follow-Up:  Yes       Discharge recommendations:  Moderate Intensity Therapy   Barriers to Discharge:  Safety Awareness      Time Tracking:     SLP Treatment Date:   01/19/25  Speech Start Time:  1420  Speech Stop Time:  1519     Speech Total Time (min):  59 min    Billable Minutes: " Speech Therapy Individual      01/19/2025    Jenni Corado MSCCC-SLP

## 2025-01-20 LAB
ANION GAP SERPL CALCULATED.3IONS-SCNC: 8 MMOL/L (ref 7–16)
BASOPHILS # BLD AUTO: 0.04 K/UL (ref 0–0.2)
BASOPHILS NFR BLD AUTO: 0.6 % (ref 0–1)
BUN SERPL-MCNC: 12 MG/DL (ref 10–20)
BUN/CREAT SERPL: 18 (ref 6–20)
CALCIUM SERPL-MCNC: 9.2 MG/DL (ref 8.4–10.2)
CHLORIDE SERPL-SCNC: 111 MMOL/L (ref 98–107)
CO2 SERPL-SCNC: 27 MMOL/L (ref 23–31)
CREAT SERPL-MCNC: 0.65 MG/DL (ref 0.55–1.02)
DIFFERENTIAL METHOD BLD: ABNORMAL
EGFR (NO RACE VARIABLE) (RUSH/TITUS): 87 ML/MIN/1.73M2
EOSINOPHIL # BLD AUTO: 0.14 K/UL (ref 0–0.5)
EOSINOPHIL NFR BLD AUTO: 2.1 % (ref 1–4)
ERYTHROCYTE [DISTWIDTH] IN BLOOD BY AUTOMATED COUNT: 15 % (ref 11.5–14.5)
GLUCOSE SERPL-MCNC: 91 MG/DL (ref 82–115)
HCT VFR BLD AUTO: 31.9 % (ref 38–47)
HGB BLD-MCNC: 10.6 G/DL (ref 12–16)
IMM GRANULOCYTES # BLD AUTO: 0.08 K/UL (ref 0–0.04)
IMM GRANULOCYTES NFR BLD: 1.2 % (ref 0–0.4)
LYMPHOCYTES # BLD AUTO: 1.29 K/UL (ref 1–4.8)
LYMPHOCYTES NFR BLD AUTO: 19.1 % (ref 27–41)
MAGNESIUM SERPL-MCNC: 2 MG/DL (ref 1.6–2.6)
MCH RBC QN AUTO: 30.1 PG (ref 27–31)
MCHC RBC AUTO-ENTMCNC: 33.2 G/DL (ref 32–36)
MCV RBC AUTO: 90.6 FL (ref 80–96)
MONOCYTES # BLD AUTO: 0.48 K/UL (ref 0–0.8)
MONOCYTES NFR BLD AUTO: 7.1 % (ref 2–6)
MPC BLD CALC-MCNC: 10 FL (ref 9.4–12.4)
NEUTROPHILS # BLD AUTO: 4.72 K/UL (ref 1.8–7.7)
NEUTROPHILS NFR BLD AUTO: 69.9 % (ref 53–65)
NRBC # BLD AUTO: 0 X10E3/UL
NRBC, AUTO (.00): 0 %
PHOSPHATE SERPL-MCNC: 2.9 MG/DL (ref 2.3–4.7)
PLATELET # BLD AUTO: 218 K/UL (ref 150–400)
POTASSIUM SERPL-SCNC: 2.7 MMOL/L (ref 3.5–5.1)
RBC # BLD AUTO: 3.52 M/UL (ref 4.2–5.4)
SODIUM SERPL-SCNC: 143 MMOL/L (ref 136–145)
WBC # BLD AUTO: 6.75 K/UL (ref 4.5–11)

## 2025-01-20 PROCEDURE — 25000003 PHARM REV CODE 250: Performed by: FAMILY MEDICINE

## 2025-01-20 PROCEDURE — 85025 COMPLETE CBC W/AUTO DIFF WBC: CPT | Performed by: FAMILY MEDICINE

## 2025-01-20 PROCEDURE — 36415 COLL VENOUS BLD VENIPUNCTURE: CPT | Performed by: FAMILY MEDICINE

## 2025-01-20 PROCEDURE — 97530 THERAPEUTIC ACTIVITIES: CPT | Mod: CQ

## 2025-01-20 PROCEDURE — 84100 ASSAY OF PHOSPHORUS: CPT | Performed by: FAMILY MEDICINE

## 2025-01-20 PROCEDURE — 83735 ASSAY OF MAGNESIUM: CPT | Performed by: FAMILY MEDICINE

## 2025-01-20 PROCEDURE — 11000004 HC SNF PRIVATE

## 2025-01-20 PROCEDURE — 97116 GAIT TRAINING THERAPY: CPT | Mod: CQ

## 2025-01-20 PROCEDURE — 92507 TX SP LANG VOICE COMM INDIV: CPT

## 2025-01-20 PROCEDURE — 80048 BASIC METABOLIC PNL TOTAL CA: CPT | Performed by: FAMILY MEDICINE

## 2025-01-20 PROCEDURE — 99308 SBSQ NF CARE LOW MDM 20: CPT | Mod: ,,, | Performed by: FAMILY MEDICINE

## 2025-01-20 PROCEDURE — 94761 N-INVAS EAR/PLS OXIMETRY MLT: CPT

## 2025-01-20 PROCEDURE — 25000003 PHARM REV CODE 250: Performed by: SPECIALIST

## 2025-01-20 RX ORDER — POTASSIUM CHLORIDE 20 MEQ/1
40 TABLET, EXTENDED RELEASE ORAL ONCE
Status: COMPLETED | OUTPATIENT
Start: 2025-01-20 | End: 2025-01-20

## 2025-01-20 RX ORDER — POTASSIUM CHLORIDE 750 MG/1
10 TABLET, EXTENDED RELEASE ORAL 2 TIMES DAILY
Status: DISCONTINUED | OUTPATIENT
Start: 2025-01-20 | End: 2025-02-01 | Stop reason: HOSPADM

## 2025-01-20 RX ADMIN — THERA TABS 1 TABLET: TAB at 09:01

## 2025-01-20 RX ADMIN — DONEPEZIL HYDROCHLORIDE 5 MG: 5 TABLET ORAL at 08:01

## 2025-01-20 RX ADMIN — POTASSIUM CHLORIDE 10 MEQ: 750 TABLET, FILM COATED, EXTENDED RELEASE ORAL at 08:01

## 2025-01-20 RX ADMIN — POTASSIUM CHLORIDE 40 MEQ: 1500 TABLET, EXTENDED RELEASE ORAL at 06:01

## 2025-01-20 RX ADMIN — APIXABAN 2.5 MG: 2.5 TABLET, FILM COATED ORAL at 08:01

## 2025-01-20 RX ADMIN — HYDROCORTISONE: 25 CREAM TOPICAL at 09:01

## 2025-01-20 RX ADMIN — APIXABAN 2.5 MG: 2.5 TABLET, FILM COATED ORAL at 09:01

## 2025-01-20 RX ADMIN — ATORVASTATIN CALCIUM 20 MG: 20 TABLET, FILM COATED ORAL at 09:01

## 2025-01-20 RX ADMIN — POTASSIUM CHLORIDE 10 MEQ: 750 TABLET, FILM COATED, EXTENDED RELEASE ORAL at 09:01

## 2025-01-20 RX ADMIN — ASPIRIN 81 MG CHEWABLE TABLET 81 MG: 81 TABLET CHEWABLE at 09:01

## 2025-01-20 RX ADMIN — HYDROCORTISONE: 25 CREAM TOPICAL at 08:01

## 2025-01-20 NOTE — PT/OT/SLP PROGRESS
"Speech Language Pathology Treatment    Patient Name:  Vy Hussein   MRN:  [id number]  Admitting Diagnosis: Muscular weakness    Recommendations:                 General Recommendations:  Speech/language therapy  Diet recommendations:   ,     Aspiration Precautions:       General Precautions: Standard,    Communication strategies:  provide increased time to answer and go to room if call light pushed    Assessment:     Vy Hussein is a 84 y.o. female with an SLP diagnosis of Aphasia.  She presents with expressive aphasia.    Subjective       Patient goals: Patient will be Independently oriented x4, 5/5 therapy sessions.   Patient answered "wh" questions with 80% accuracy Independently.   Patient completed word finding tasks with 75% accuracy Independently.   Patient completed divergent naming tasks with 75% accuracy Independently.   Patient completed convergent naming tasks with 80% accuracy Independently.   Patient completed problem solving tasks with 80% accuracy Independently.   Patient completed verbal reasoning tasks with 80% accuracy Independently.   Patient completed short term memory tasks with 80% accuracy Independently.      When pt uses slow speech; pt is intelligible.    Pain/Comfort:       Respiratory Status: Room air    Objective:     Has the patient been evaluated by SLP for swallowing?   No  Keep patient NPO? No   Current Respiratory Status:            Goals:   Multidisciplinary Problems       SLP Goals       Not on file                    Plan:     Patient to be seen:  3 x/week   Plan of Care expires:  01/31/25  Plan of Care reviewed with:  patient   SLP Follow-Up:  Yes       Discharge recommendations:  Moderate Intensity Therapy   Barriers to Discharge:  Safety Awareness      Time Tracking:     SLP Treatment Date:   01/20/25  Speech Start Time:  1345  Speech Stop Time:  1435     Speech Total Time (min):  50 min    Billable Minutes: Speech Therapy Individual      01/20/2025    Jenni Corado" MSCCC-SLP

## 2025-01-20 NOTE — PT/OT/SLP PROGRESS
Physical Therapy Treatment    Patient Name:  Vy Hussein   MRN:  [id number]    Recommendations:     Discharge Recommendations: Moderate Intensity Therapy  Discharge Equipment Recommendations: walker, rolling  Barriers to discharge: None    Assessment:     Vy Hussein is a 84 y.o. female admitted with a medical diagnosis of Muscular weakness.  She presents with the following impairments/functional limitations: weakness, impaired endurance, impaired self care skills, impaired functional mobility, gait instability, impaired balance, decreased upper extremity function, decreased lower extremity function, orthopedic precautions.  Attempted Therapeutic exercises with patient positioned supine and head elevated, but patient began coughing.  Patient repositioned to sitting on side of low treatment table. Patient then instructed in standing Therapeutic activities.  Patient able to increase distance during gait training.     Rehab Prognosis: Good and Fair; patient would benefit from acute skilled PT services to address these deficits and reach maximum level of function.    Recent Surgery: * No surgery found *      Plan:     During this hospitalization, patient to be seen 5 x/week to address the identified rehab impairments via gait training, therapeutic exercises and progress toward the following goals:    Plan of Care Expires:  01/28/25    Subjective     Chief Complaint: weakness  Patient/Family Comments/goals:   Pain/Comfort:  Pain Rating 1: 0/10      Objective:     Communicated with patient and skilled nursing prior to session.  Patient found seated EOB. General Precautions: Standard, fall  Orthopedic Precautions: RLE weight bearing as tolerated  Braces: N/A  Respiratory Status: Room air     Functional Mobility:  Bed Mobility:     Sit to Supine: minimum assistance  Transfers:     Sit to Stand:  contact guard assistance with rolling walker  Gait: 125' x 2  with rolling walker and CGA; slow, guarded      AM-PAC 6  CLICK MOBILITY  Sitting down on and standing up from a chair with arms (e.g., wheelchair, bedside commode, etc.): 4  Moving from lying on back to sitting on the side of the bed?: 3  Moving to and from a bed to a chair (including a wheelchair)?: 3  Need to walk in hospital room?: 3  Climbing 3-5 steps with a railing?: 3       Treatment & Education:  Ther-Ex Reps       Ankle pumps 30 x    Quad sets 30 x    Horizontal Hip Abduction/Hip ADD    Hip ADD 3 x 10   Heelslides    Short Arc Quads'     Hamstring curls    Seated Marching    Hip ABD    Long sitting straight leg raises                Therapeutic activities 2 x 10 each with patient standing at walker:  heel raises, marching; Right LE hip abduction; Right LE hip extension  Sit to stand 2 x 5       Patient left up seated on EOB with bed alarm on.  Call bell within reach.     GOALS:   Multidisciplinary Problems       Physical Therapy Goals          Problem: Physical Therapy    Goal Priority Disciplines Outcome Interventions   Physical Therapy Goal     PT, PT/OT Progressing    Description: Short term goals  1. Pt will require min assist for bed mobility  2. Pt will require min assist for sit to stand transfer   3. Pt will ambulate 25 feet with RW and CGA    Long term goals  1. Pt will be independent with all bed mobility  2. Pt will be independent/mod independent with sit to stand transfer  3. Pt will perform 5 sit to stands with only 1 UE for assistance  4. Pt will ambulate 50 feet with RW and CGA  5. Pt will be independent with ascending and descending 2 standard height steps with handrail                       Time Tracking:     PT Received On: 01/20/25  PT Start Time: 1453     PT Stop Time: 1525  PT Total Time (min): 32 min     Billable Minutes:  Therapeutic activities  22 minutes; Gait 10     Treatment Type: Treatment  PT/PTA: PTA     Number of PTA visits since last PT visit: 1     Continue Plan of Care per PT order to progress patient toward rehab goals as  tolerated by patient.   PERLA Rosenthal     01/20/2025

## 2025-01-20 NOTE — NURSING
0620 ED dr notified of pts potassium level. Orders rec'd for one time dose of 40mEq of potassium.

## 2025-01-20 NOTE — PROGRESS NOTES
[facility]  Hospital Medicine  Progress Note    Patient Name: Vy Hussein  MRN: [id number]  Patient Class: IP- Swing   Admission Date: 12/24/2024  Length of Stay: 27 days  Attending Physician: Elizabeth Castro MD  Primary Care Provider: Flora Brennan MD        Subjective     Principal Problem:Muscular weakness        HPI:  No notes on file    Overview/Hospital Course:  12/27/24 - up in chair. No complaints voiced.    12/30/24 - doing well today. No complaints voiced.    1/3/25 - has a cough this AM. Has cough syrup ordered. Will continue present treatment.    1/6/25 - doing well. To have staples removed today.    1/10/25 - doing well. Will continue present treatment.    1/13/25 - doing well. Will continue present treatment.    1/14/25 - had 3 diarrheal stools last night. Orders revised.    1/15/25 - still with diarrhea. Orders revised.    1/17/25 - feels better. Hemoglobin has dropped. Orders revised.    1/20/25 - doing well. No complaints.    Interval History: orders revised.    Review of Systems  Objective:     Vital Signs (Most Recent):  Temp: 97.4 °F (36.3 °C) (01/17/25 0813)  Pulse: 74 (01/17/25 0813)  Resp: 18 (01/17/25 0813)  BP: 129/73 (01/17/25 0813)  SpO2: 96 % (01/17/25 0813) Vital Signs (24h Range):  Temp:  [97.4 °F (36.3 °C)-97.7 °F (36.5 °C)] 97.4 °F (36.3 °C)  Pulse:  [67-79] 74  Resp:  [18-20] 18  SpO2:  [95 %-97 %] 96 %  BP: (129-154)/(64-73) 129/73     Weight: 39.7 kg (87 lb 9.6 oz)  Body mass index is 14.14 kg/m².    Intake/Output Summary (Last 24 hours) at 1/17/2025 0840  Last data filed at 1/16/2025 1730  Gross per 24 hour   Intake 480 ml   Output --   Net 480 ml         Physical Exam        Significant Labs: All pertinent labs within the past 24 hours have been reviewed.    Significant Imaging: I have reviewed all pertinent imaging results/findings within the past 24 hours.    Assessment and Plan     No notes have been filed under this hospital  service.  Service: Hospital Medicine    VTE Risk Mitigation (From admission, onward)           Ordered     apixaban tablet 2.5 mg  2 times daily         12/25/24 1056     IP VTE HIGH RISK PATIENT  Once         12/24/24 1544                    Discharge Planning   REESE:      Code Status: Prior   Medical Readiness for Discharge Date:   Discharge Plan A: Home, Home Health                Please place Justification for DME        Ema Chamorro MD  Department of Hospital Medicine   [facility]

## 2025-01-20 NOTE — PLAN OF CARE
Problem: Adult Inpatient Plan of Care  Goal: Plan of Care Review  Outcome: Progressing  Goal: Patient-Specific Goal (Individualized)  Outcome: Progressing  Goal: Optimal Comfort and Wellbeing  Outcome: Progressing     Problem: Skin Injury Risk Increased  Goal: Skin Health and Integrity  Outcome: Progressing  Intervention: Optimize Skin Protection  Flowsheets (Taken 1/20/2025 0410)  Pressure Reduction Techniques: frequent weight shift encouraged  Pressure Reduction Devices:   chair cushion utilized   foam padding utilized  Skin Protection: incontinence pads utilized  Activity Management: Ambulated to bathroom - L4  Intervention: Promote and Optimize Oral Intake  Flowsheets (Taken 1/20/2025 0410)  Oral Nutrition Promotion: physical activity promoted     Problem: Fall Injury Risk  Goal: Absence of Fall and Fall-Related Injury  Outcome: Progressing  Intervention: Identify and Manage Contributors  Flowsheets (Taken 1/20/2025 0410)  Self-Care Promotion:   independence encouraged   BADL personal objects within reach   BADL personal routines maintained   adaptive equipment use encouraged  Medication Review/Management: medications reviewed  Intervention: Promote Injury-Free Environment  Flowsheets (Taken 1/20/2025 0410)  Safety Promotion/Fall Prevention:   assistive device/personal item within reach   diversional activities provided   Fall Risk reviewed with patient/family   Fall Risk signage in place   lighting adjusted   medications reviewed   muscle strengthening facilitated   nonskid shoes/socks when out of bed   patient expresses understanding of fall risk and prevention   room near unit station   side rails raised x 2     Problem: Communication Impairment  Goal: Effective Communication Skills  Outcome: Progressing

## 2025-01-20 NOTE — PLAN OF CARE
Problem: Adult Inpatient Plan of Care  Goal: Patient-Specific Goal (Individualized)  Outcome: Progressing  Goal: Optimal Comfort and Wellbeing  Outcome: Progressing  Intervention: Monitor Pain and Promote Comfort  Flowsheets (Taken 1/20/2025 1535)  Pain Management Interventions:   care clustered   medication offered  Intervention: Provide Person-Centered Care  Flowsheets (Taken 1/20/2025 1535)  Trust Relationship/Rapport:   care explained   choices provided   emotional support provided   empathic listening provided   questions answered   questions encouraged   reassurance provided   thoughts/feelings acknowledged     Problem: Fall Injury Risk  Goal: Absence of Fall and Fall-Related Injury  Outcome: Progressing  Intervention: Identify and Manage Contributors  Flowsheets (Taken 1/20/2025 1535)  Self-Care Promotion:   independence encouraged   BADL personal objects within reach   BADL personal routines maintained   meal set-up provided   adaptive equipment use encouraged  Medication Review/Management: medications reviewed  Intervention: Promote Injury-Free Environment  Flowsheets (Taken 1/20/2025 1535)  Safety Promotion/Fall Prevention:   medications reviewed   instructed to call staff for mobility   high risk medications identified   Fall Risk reviewed with patient/family

## 2025-01-21 PROCEDURE — 97110 THERAPEUTIC EXERCISES: CPT | Mod: CQ

## 2025-01-21 PROCEDURE — 11000004 HC SNF PRIVATE

## 2025-01-21 PROCEDURE — 25000003 PHARM REV CODE 250: Performed by: FAMILY MEDICINE

## 2025-01-21 PROCEDURE — 97530 THERAPEUTIC ACTIVITIES: CPT | Mod: CQ

## 2025-01-21 PROCEDURE — 94761 N-INVAS EAR/PLS OXIMETRY MLT: CPT

## 2025-01-21 RX ADMIN — DONEPEZIL HYDROCHLORIDE 5 MG: 5 TABLET ORAL at 08:01

## 2025-01-21 RX ADMIN — THERA TABS 1 TABLET: TAB at 08:01

## 2025-01-21 RX ADMIN — POTASSIUM CHLORIDE 10 MEQ: 750 TABLET, FILM COATED, EXTENDED RELEASE ORAL at 08:01

## 2025-01-21 RX ADMIN — APIXABAN 2.5 MG: 2.5 TABLET, FILM COATED ORAL at 08:01

## 2025-01-21 RX ADMIN — HYDROCORTISONE: 25 CREAM TOPICAL at 08:01

## 2025-01-21 RX ADMIN — ASPIRIN 81 MG CHEWABLE TABLET 81 MG: 81 TABLET CHEWABLE at 08:01

## 2025-01-21 RX ADMIN — ATORVASTATIN CALCIUM 20 MG: 20 TABLET, FILM COATED ORAL at 08:01

## 2025-01-21 NOTE — PLAN OF CARE
Spoke with pt and pt's nephew Solo at the bedside. Informed them that next review date with insurance is tomorrow. Will update them again when more information is available.

## 2025-01-21 NOTE — PT/OT/SLP PROGRESS
Physical Therapy Treatment    Patient Name:  Vy Hussein   MRN:  [id number]    Recommendations:     Discharge Recommendations: Moderate Intensity Therapy  Discharge Equipment Recommendations: walker, rolling  Barriers to discharge: None    Assessment:     Vy Hussein is a 84 y.o. female admitted with a medical diagnosis of Muscular weakness.  She presents with the following impairments/functional limitations: weakness .  Pt reports she is having to build back up after being sick. She required frequent rest breaks due to fatigue.    Rehab Prognosis: Good; patient would benefit from acute skilled PT services to address these deficits and reach maximum level of function.    Recent Surgery: * No surgery found *      Plan:     During this hospitalization, patient to be seen 5 x/week to address the identified rehab impairments via gait training, therapeutic exercises and progress toward the following goals:    Plan of Care Expires:  01/28/25    Subjective     Chief Complaint: weakness   Patient/Family Comments/goals: agrees to PT Tx as able   Pain/Comfort:  Pain Rating 1: 0/10      Objective:     Communicated with patient prior to session.  Patient found sitting edge of bed with chair check upon PT entry to room.     General Precautions: Standard, fall  Orthopedic Precautions: RLE weight bearing as tolerated  Braces: N/A  Respiratory Status: Room air     Functional Mobility:  Transfers:     Sit to Stand:  modified independence and stand by assistance with rolling walker  Gait: 180ft with rolling walker, vc's and CGA; pt with slow, steady pace; somewhat guarded  Balance: good with BUE support; able to sit unsupported       AM-PAC 6 CLICK MOBILITY  Turning over in bed (including adjusting bedclothes, sheets and blankets)?: 4  Sitting down on and standing up from a chair with arms (e.g., wheelchair, bedside commode, etc.): 4  Moving from lying on back to sitting on the side of the bed?: 3  Moving to and from a bed  to a chair (including a wheelchair)?: 3  Need to walk in hospital room?: 3  Climbing 3-5 steps with a railing?: 3  Basic Mobility Total Score: 20       Treatment & Education:  Sitting BLE exercises x 20 repetitions: marching, long arc quads, hip abduction, ankle rocking  Standing at walker for BUE support for BLE exercises: marching, mini squats, heel/toe raises, hip abduction.   Sit to stand transfers during session  Ambulation for distance, endurance.    Patient left sitting edge of bed with call button in reach..    GOALS:   Multidisciplinary Problems       Physical Therapy Goals          Problem: Physical Therapy    Goal Priority Disciplines Outcome Interventions   Physical Therapy Goal     PT, PT/OT Progressing    Description: Short term goals  1. Pt will require min assist for bed mobility  2. Pt will require min assist for sit to stand transfer   3. Pt will ambulate 25 feet with RW and CGA    Long term goals  1. Pt will be independent with all bed mobility  2. Pt will be independent/mod independent with sit to stand transfer  3. Pt will perform 5 sit to stands with only 1 UE for assistance  4. Pt will ambulate 50 feet with RW and CGA  5. Pt will be independent with ascending and descending 2 standard height steps with handrail                       Time Tracking:     PT Received On: 01/21/25  PT Start Time: 0810     PT Stop Time: 0835  PT Total Time (min): 25 min     Billable Minutes: Therapeutic Activity 10 and Therapeutic Exercise 15    Treatment Type: Treatment  PT/PTA: PTA     Number of PTA visits since last PT visit: 2     01/21/2025

## 2025-01-21 NOTE — PLAN OF CARE
Problem: Adult Inpatient Plan of Care  Goal: Optimal Comfort and Wellbeing  Outcome: Progressing  Intervention: Monitor Pain and Promote Comfort  Flowsheets (Taken 1/21/2025 1406)  Pain Management Interventions:   care clustered   pillow support provided   position adjusted  Intervention: Provide Person-Centered Care  Flowsheets (Taken 1/21/2025 1406)  Trust Relationship/Rapport:   care explained   choices provided   emotional support provided   empathic listening provided   questions answered   questions encouraged   reassurance provided   thoughts/feelings acknowledged     Problem: Skin Injury Risk Increased  Goal: Skin Health and Integrity  Outcome: Progressing  Intervention: Optimize Skin Protection  Flowsheets (Taken 1/21/2025 1406)  Pressure Reduction Techniques:   frequent weight shift encouraged   pressure points protected  Pressure Reduction Devices: foam padding utilized  Skin Protection:   incontinence pads utilized   protective footwear used  Activity Management:   Ambulated -L4   Ambulated in room - L4  Intervention: Promote and Optimize Oral Intake  Flowsheets (Taken 1/21/2025 1406)  Oral Nutrition Promotion: rest periods promoted  Nutrition Interventions: meal set-up provided

## 2025-01-22 PROCEDURE — 25000003 PHARM REV CODE 250: Performed by: FAMILY MEDICINE

## 2025-01-22 PROCEDURE — 94761 N-INVAS EAR/PLS OXIMETRY MLT: CPT

## 2025-01-22 PROCEDURE — 97530 THERAPEUTIC ACTIVITIES: CPT | Mod: CQ

## 2025-01-22 PROCEDURE — 97530 THERAPEUTIC ACTIVITIES: CPT

## 2025-01-22 PROCEDURE — 97110 THERAPEUTIC EXERCISES: CPT

## 2025-01-22 PROCEDURE — 97110 THERAPEUTIC EXERCISES: CPT | Mod: CQ

## 2025-01-22 PROCEDURE — 11000004 HC SNF PRIVATE

## 2025-01-22 RX ADMIN — ATORVASTATIN CALCIUM 20 MG: 20 TABLET, FILM COATED ORAL at 08:01

## 2025-01-22 RX ADMIN — HYDROCODONE BITARTRATE AND ACETAMINOPHEN 1 TABLET: 5; 325 TABLET ORAL at 01:01

## 2025-01-22 RX ADMIN — HYDROCORTISONE: 25 CREAM TOPICAL at 09:01

## 2025-01-22 RX ADMIN — ASPIRIN 81 MG CHEWABLE TABLET 81 MG: 81 TABLET CHEWABLE at 08:01

## 2025-01-22 RX ADMIN — APIXABAN 2.5 MG: 2.5 TABLET, FILM COATED ORAL at 09:01

## 2025-01-22 RX ADMIN — DONEPEZIL HYDROCHLORIDE 5 MG: 5 TABLET ORAL at 09:01

## 2025-01-22 RX ADMIN — POTASSIUM CHLORIDE 10 MEQ: 750 TABLET, FILM COATED, EXTENDED RELEASE ORAL at 09:01

## 2025-01-22 RX ADMIN — POTASSIUM CHLORIDE 10 MEQ: 750 TABLET, FILM COATED, EXTENDED RELEASE ORAL at 08:01

## 2025-01-22 RX ADMIN — THERA TABS 1 TABLET: TAB at 08:01

## 2025-01-22 RX ADMIN — HYDROCORTISONE: 25 CREAM TOPICAL at 08:01

## 2025-01-22 RX ADMIN — APIXABAN 2.5 MG: 2.5 TABLET, FILM COATED ORAL at 08:01

## 2025-01-22 NOTE — PT/OT/SLP PROGRESS
Occupational Therapy   Treatment    Name: Vy Hussein  MRN: [id number]  Admitting Diagnosis:  Muscular weakness       Recommendations:     Discharge Recommendations: Moderate Intensity Therapy  Discharge Equipment Recommendations:  walker, rolling  Barriers to discharge:  Decreased caregiver support    Assessment:     Vy Hussein is a 84 y.o. female with a medical diagnosis of Muscular weakness.  She presents with weakness. Performance deficits affecting function are weakness, impaired endurance, impaired self care skills, impaired functional mobility, impaired balance, decreased lower extremity function, decreased safety awareness.     Rehab Prognosis:  Good; patient would benefit from acute skilled OT services to address these deficits and reach maximum level of function.       Plan:     Patient to be seen 5 x/week to address the above listed problems via therapeutic exercises, therapeutic activities, self-care/home management  Plan of Care Expires:    Plan of Care Reviewed with: patient    Subjective     Chief Complaint: Pt c/o hip P! 9/10. Pt received P! Meds prior to therapy  Patient/Family Comments/goals: return to PLOF  Pain/Comfort:       Objective:     Communicated with: PTA prior to session.  Patient found up in chair with   upon OT entry to room.    General Precautions: Standard, fall    Orthopedic Precautions:   Braces:    Respiratory Status: Room air     Occupational Performance:     Bed Mobility:    Patient completed Sit to Supine with supervision     Functional Mobility/Transfers:  Patient completed Sit <> Stand Transfer with stand by assistance  with  rolling walker   Functional Mobility: Pt ambulated ~ 132 ft from therapy gym to room using RW with close supervision for safety    Activities of Daily Living:        [facility] 6 Click ADL:      Treatment & Education:  Pt completed BUE elbow flex, chest press, and sh flex with 2# dowel and sh retraction with yellow Tband 40x ea ex to increase  strength and endurance for improved performance in self care skills    Patient left right sidelying with call button in reach    GOALS:   Multidisciplinary Problems       Occupational Therapy Goals          Problem: Occupational Therapy    Goal Priority Disciplines Outcome Interventions   Occupational Therapy Goal     OT, PT/OT Progressing    Description: STG:  Pt will perform grooming with setup  Pt will bathe with Mod I  Pt will perform UE dressing with I after set up  Pt will perform LE dressing with Mod I  Pt will sit EOB x 15 min with no assistance  Pt will transfer bed/chair/bsc with Mod I  Pt will perform standing task x 15 min with supervision assistance  Pt will tolerate 45 minutes of tx without fatigue      LT.Restore to max I with self care and mobility.                         DME Justifications:  No DME recommended requiring DME justifications    Time Tracking:     OT Date of Treatment: 25  OT Start Time: 1320  OT Stop Time: 1344  OT Total Time (min): 24 min    Billable Minutes:Therapeutic Activity 10  Therapeutic Exercise 14          Number of PHILIP visits since last OT visit: 2025

## 2025-01-22 NOTE — NURSING
Pt sitting up on side of bed eating breakfast. Alert, oriented. NADN. No needs voiced at this time. Call bell near. Will continue to monitor.

## 2025-01-22 NOTE — PT/OT/SLP PROGRESS
Physical Therapy Treatment    Patient Name:  Vy Hussein   MRN:  [id number]    Recommendations:     Discharge Recommendations: Moderate Intensity Therapy  Discharge Equipment Recommendations: walker, rolling  Barriers to discharge: None    Assessment:     Vy Hussein is a 84 y.o. female admitted with a medical diagnosis of Muscular weakness.  She presents with the following impairments/functional limitations: weakness.  Pt limited by right hip pain/discomfort today, however, participated well with treatment as she is eager to get better.    Rehab Prognosis: Good; patient would benefit from acute skilled PT services to address these deficits and reach maximum level of function.    Recent Surgery: * No surgery found *      Plan:     During this hospitalization, patient to be seen 5 x/week to address the identified rehab impairments via gait training, therapeutic exercises and progress toward the following goals:    Plan of Care Expires:  01/28/25    Subjective     Chief Complaint: pain as noted; RN administered pain pill during session  Patient/Family Comments/goals: agrees to PT Tx in spite of pain/discomfort  Pain/Comfort:  Pain Rating 1: 6/10  Location - Side 1: Right  Location 1: hip  Pain Addressed 1: Pre-medicate for activity, Reposition, Distraction, Cessation of Activity, Nurse notified  Pain Rating Post-Intervention 1: 4/10      Objective:     Communicated with patient prior to session.  Patient found HOB elevated with chair check upon PT entry to room.     General Precautions: Standard, fall  Orthopedic Precautions: RLE weight bearing as tolerated  Braces: N/A  Respiratory Status: Room air     Functional Mobility:  Bed Mobility:     Scooting: modified independence  Bridging: modified independence  Supine to Sit: modified independence  Sit to Supine: modified independence  Transfers:     Sit to Stand:  modified independence with rolling walker  Bed to Chair: modified independence and contact guard  assistance with  rolling walker  using  Step Transfer  Gait: 125ft with rolling walker, mod independent to SVN; slow, steady pace  Stairs:  Pt ascended/descended 5 stair(s) and   with No Assistive Device with bilateral handrails with Stand-by Assistance and Contact Guard Assistance.       AM-PAC 6 CLICK MOBILITY  Turning over in bed (including adjusting bedclothes, sheets and blankets)?: 4  Sitting down on and standing up from a chair with arms (e.g., wheelchair, bedside commode, etc.): 4  Moving from lying on back to sitting on the side of the bed?: 4  Moving to and from a bed to a chair (including a wheelchair)?: 3  Need to walk in hospital room?: 3  Climbing 3-5 steps with a railing?: 3  Basic Mobility Total Score: 21       Treatment & Education:  Sitting BLE exercises x 20 repetitions each with 2# ankle weights: marching, long arc quads, hip abduction (with blue band), ankle rocking  Steps negotiation as noted above  Sit to stand transfers  Bridging x 20 repetitions with hip adduction isometrics in bed at beginning of session  Right hip flexor stretching, 10 second hold x 2  Ambulation as noted above    Patient left up in chair with  OTR present..    GOALS:   Multidisciplinary Problems       Physical Therapy Goals          Problem: Physical Therapy    Goal Priority Disciplines Outcome Interventions   Physical Therapy Goal     PT, PT/OT Progressing    Description: Short term goals  1. Pt will require min assist for bed mobility  2. Pt will require min assist for sit to stand transfer   3. Pt will ambulate 25 feet with RW and CGA    Long term goals  1. Pt will be independent with all bed mobility  2. Pt will be independent/mod independent with sit to stand transfer  3. Pt will perform 5 sit to stands with only 1 UE for assistance  4. Pt will ambulate 50 feet with RW and CGA  5. Pt will be independent with ascending and descending 2 standard height steps with handrail                       Time Tracking:     PT  Received On: 01/22/25  PT Start Time: 1255     PT Stop Time: 1320  PT Total Time (min): 25 min     Billable Minutes: Therapeutic Activity 10 and Therapeutic Exercise 15    Treatment Type: Treatment  PT/PTA: PTA     Number of PTA visits since last PT visit: 3     01/22/2025

## 2025-01-23 LAB
ANION GAP SERPL CALCULATED.3IONS-SCNC: 12 MMOL/L (ref 7–16)
BASOPHILS # BLD AUTO: 0.07 K/UL (ref 0–0.2)
BASOPHILS NFR BLD AUTO: 0.9 % (ref 0–1)
BUN SERPL-MCNC: 19 MG/DL (ref 10–20)
BUN/CREAT SERPL: 26 (ref 6–20)
CALCIUM SERPL-MCNC: 9.4 MG/DL (ref 8.4–10.2)
CHLORIDE SERPL-SCNC: 103 MMOL/L (ref 98–107)
CO2 SERPL-SCNC: 30 MMOL/L (ref 23–31)
CREAT SERPL-MCNC: 0.74 MG/DL (ref 0.55–1.02)
DIFFERENTIAL METHOD BLD: ABNORMAL
EGFR (NO RACE VARIABLE) (RUSH/TITUS): 80 ML/MIN/1.73M2
EOSINOPHIL # BLD AUTO: 0.2 K/UL (ref 0–0.5)
EOSINOPHIL NFR BLD AUTO: 2.6 % (ref 1–4)
ERYTHROCYTE [DISTWIDTH] IN BLOOD BY AUTOMATED COUNT: 15.5 % (ref 11.5–14.5)
GLUCOSE SERPL-MCNC: 94 MG/DL (ref 82–115)
HCT VFR BLD AUTO: 38 % (ref 38–47)
HGB BLD-MCNC: 12.5 G/DL (ref 12–16)
IMM GRANULOCYTES # BLD AUTO: 0.03 K/UL (ref 0–0.04)
IMM GRANULOCYTES NFR BLD: 0.4 % (ref 0–0.4)
LYMPHOCYTES # BLD AUTO: 1.5 K/UL (ref 1–4.8)
LYMPHOCYTES NFR BLD AUTO: 19.6 % (ref 27–41)
MAGNESIUM SERPL-MCNC: 2.1 MG/DL (ref 1.6–2.6)
MCH RBC QN AUTO: 29.8 PG (ref 27–31)
MCHC RBC AUTO-ENTMCNC: 32.9 G/DL (ref 32–36)
MCV RBC AUTO: 90.7 FL (ref 80–96)
MONOCYTES # BLD AUTO: 0.52 K/UL (ref 0–0.8)
MONOCYTES NFR BLD AUTO: 6.8 % (ref 2–6)
MPC BLD CALC-MCNC: 10.1 FL (ref 9.4–12.4)
NEUTROPHILS # BLD AUTO: 5.35 K/UL (ref 1.8–7.7)
NEUTROPHILS NFR BLD AUTO: 69.7 % (ref 53–65)
NRBC # BLD AUTO: 0 X10E3/UL
NRBC, AUTO (.00): 0 %
OCCULT BLOOD: POSITIVE
PHOSPHATE SERPL-MCNC: 3.5 MG/DL (ref 2.3–4.7)
PLATELET # BLD AUTO: 278 K/UL (ref 150–400)
POTASSIUM SERPL-SCNC: 3.3 MMOL/L (ref 3.5–5.1)
RBC # BLD AUTO: 4.19 M/UL (ref 4.2–5.4)
SODIUM SERPL-SCNC: 142 MMOL/L (ref 136–145)
WBC # BLD AUTO: 7.67 K/UL (ref 4.5–11)

## 2025-01-23 PROCEDURE — 25000003 PHARM REV CODE 250: Performed by: FAMILY MEDICINE

## 2025-01-23 PROCEDURE — 97110 THERAPEUTIC EXERCISES: CPT

## 2025-01-23 PROCEDURE — 85025 COMPLETE CBC W/AUTO DIFF WBC: CPT | Performed by: FAMILY MEDICINE

## 2025-01-23 PROCEDURE — 80048 BASIC METABOLIC PNL TOTAL CA: CPT | Performed by: FAMILY MEDICINE

## 2025-01-23 PROCEDURE — 82272 OCCULT BLD FECES 1-3 TESTS: CPT | Performed by: FAMILY MEDICINE

## 2025-01-23 PROCEDURE — 83735 ASSAY OF MAGNESIUM: CPT | Performed by: FAMILY MEDICINE

## 2025-01-23 PROCEDURE — 94761 N-INVAS EAR/PLS OXIMETRY MLT: CPT

## 2025-01-23 PROCEDURE — 11000004 HC SNF PRIVATE

## 2025-01-23 PROCEDURE — 84100 ASSAY OF PHOSPHORUS: CPT | Performed by: FAMILY MEDICINE

## 2025-01-23 PROCEDURE — 36415 COLL VENOUS BLD VENIPUNCTURE: CPT | Performed by: FAMILY MEDICINE

## 2025-01-23 RX ADMIN — APIXABAN 2.5 MG: 2.5 TABLET, FILM COATED ORAL at 09:01

## 2025-01-23 RX ADMIN — THERA TABS 1 TABLET: TAB at 09:01

## 2025-01-23 RX ADMIN — DONEPEZIL HYDROCHLORIDE 5 MG: 5 TABLET ORAL at 09:01

## 2025-01-23 RX ADMIN — HYDROCORTISONE: 25 CREAM TOPICAL at 09:01

## 2025-01-23 RX ADMIN — POTASSIUM CHLORIDE 10 MEQ: 750 TABLET, FILM COATED, EXTENDED RELEASE ORAL at 09:01

## 2025-01-23 RX ADMIN — ATORVASTATIN CALCIUM 20 MG: 20 TABLET, FILM COATED ORAL at 09:01

## 2025-01-23 RX ADMIN — ASPIRIN 81 MG CHEWABLE TABLET 81 MG: 81 TABLET CHEWABLE at 09:01

## 2025-01-23 NOTE — PROGRESS NOTES
Sitting up on side of the bed eating breakfast. NADN. Alert, oriented x3. Able to voice needs. No requests voiced at this time. No complaints of pain. Call bell in reach. Will cont to monitor.    01/23/25 0738   Type of Frequent Check   Type Patient Rounds  (eating breakfast. nadn)   Safety/Activity   Patient Rounds call light in patient/parent reach;clutter free environment maintained;ID band on;placement of personal items at bedside;toileting offered;visualized patient   Safety Promotion/Fall Prevention assistive device/personal item within reach;nonskid shoes/socks when out of bed;in recliner, wheels locked;Fall Risk reviewed with patient/family;Fall Risk signage in place;patient expresses understanding of fall risk and prevention;side rails raised x 2   Safety Precautions limb precautions maintained   Safety Bands on Patient Fall Risk Band   Activity Management Sitting at edge of bed - L2   Activity Assistance Provided independent   Infection Prevention single patient room provided   Positioning   Body Position position changed independently

## 2025-01-23 NOTE — PLAN OF CARE
[facility]  Medical Surgical Unit - Swing Bed   Interdisciplinary Team Meeting    Patient: Vy Hussein   Today's Date: 1/23/2025   Estimated D/C Date:         Physician: Ema Chamorro MD Unit Director: Alexa Sood RN   Pharmacy: Tawnya Haro, PharmD Nursing: JAZZY Sweeney RN   : Nicky Magana RN Physical/Occupational Therapy: Lizeth Negron OT, VIPIN San PT   Speech Therapy: ST Mendoza Respiratory: See respiratory notes   Dietary: See dietary notes    Other: ELIZABETH Can, RT      Nursing  New Symptoms/Problems: none at this time                                       Urine: incontinent  Judd: No   Bowel: continent  Last Bowel Movement: 01/23/25   Constipated: No  Diarrhea: yes   Isolation: No  Cognition: WNL  Aspiration Precautions: No  Wound Care: No  Wound Location/Tx: na  Comment(s): na      Respiratory  O2 Device: Room Air  O2 Flow: na  SpO2: 95%  Neb Tx: No  Comment(s): na      Dietary  Nutrition:  mechanical soft  Comment(s): na     Speech Therapy  Speech/Swallowing: Speech difficulty  Comment(s): expressive aphasia     Physical Therapy  Gait/Assistive Device:  125ft with rolling walker, mod independent to SVN; slow, steady pace  Stairs:  Pt ascended/descended 5 stair(s) and   with No Assistive Device with bilateral handrails with Stand-by Assistance and Contact Guard Assistance.      ELOS: Plan to DC tbd    Transfers: Moderate Assistance  Bed Mobility: Activity did not occur Range of Motion/Restrictions: RLE weight bearing as tolerated   Comment(s): na                 Occupational Therapy  Eating/Grooming: Supervision or Set-up Assistance Toileting: Minimal Assistance   Bathing: Minimal Assistance Dressing (Upper Body): Minimal Assistance   Dressing (Lower Body): Moderate Assistance    Comment(s): na   Activity Therapy  Level of participation: Active participation  Comment(s): na     Pharmacy  Medication Changes: No  Labs Reviewed: Yes  New Lab Orders:  No  Comment(s): labs q mon/thurs       Tx Plan/Recommendations reviewed with family and/or patient on 1/23/25.  Additional family Conference/Training: adam  D/C Plan/Recommendations: Home with HH and Home with family  REESE:   Comment(s): adam

## 2025-01-23 NOTE — PLAN OF CARE
Problem: Adult Inpatient Plan of Care  Goal: Plan of Care Review  Outcome: Progressing  Goal: Patient-Specific Goal (Individualized)  Outcome: Progressing  Goal: Absence of Hospital-Acquired Illness or Injury  Outcome: Progressing  Goal: Optimal Comfort and Wellbeing  Outcome: Progressing  Goal: Readiness for Transition of Care  Outcome: Progressing     Problem: Skin Injury Risk Increased  Goal: Skin Health and Integrity  Outcome: Progressing     Problem: Fall Injury Risk  Goal: Absence of Fall and Fall-Related Injury  Outcome: Progressing     Problem: Infection  Goal: Absence of Infection Signs and Symptoms  Outcome: Progressing     Problem: Communication Impairment  Goal: Effective Communication Skills  Outcome: Progressing  Intervention: Optimize Communication Skills  Flowsheets (Taken 1/23/2025 1738)  Communication Enhancement Strategies:   call light answered in person   extra time allowed for response   one-step directions provided   repetition utilized   verbal communication attempts encouraged     Problem: Pain Acute  Goal: Optimal Pain Control and Function  Outcome: Progressing  Intervention: Develop Pain Management Plan  Flowsheets (Taken 1/23/2025 1738)  Pain Management Interventions:   care clustered   relaxation techniques promoted   quiet environment facilitated   medication offered  Intervention: Prevent or Manage Pain  Flowsheets (Taken 1/23/2025 1738)  Bowel Elimination Promotion: adequate fluid intake promoted  Medication Review/Management:   medications reviewed   high-risk medications identified  Intervention: Optimize Psychosocial Wellbeing  Flowsheets (Taken 1/23/2025 1738)  Supportive Measures:   active listening utilized   verbalization of feelings encouraged   relaxation techniques promoted

## 2025-01-23 NOTE — NURSING
Dr. Chamorro notified of positive occult stool. No new orders received at this time. CBC as follows for 1/23/2024     Latest Reference Range & Units 01/23/25 06:46   WBC 4.50 - 11.00 K/uL 7.67   RBC 4.20 - 5.40 M/uL 4.19 (L)   Hemoglobin 12.0 - 16.0 g/dL 12.5   Hematocrit 38.0 - 47.0 % 38.0   MCV 80.0 - 96.0 fL 90.7   MCH 27.0 - 31.0 pg 29.8   MCHC 32.0 - 36.0 g/dL 32.9   RDW 11.5 - 14.5 % 15.5 (H)   Platelet Count 150 - 400 K/uL 278   MPV 9.4 - 12.4 fL 10.1   Neutrophils Relative 53.0 - 65.0 % 69.7 (H)   Lymph % 27.0 - 41.0 % 19.6 (L)   Mono % 2.0 - 6.0 % 6.8 (H)   Eos % 1.0 - 4.0 % 2.6   Basophil % 0.0 - 1.0 % 0.9   Immature Granulocytes 0.0 - 0.4 % 0.4   Neutrophils, Abs 1.80 - 7.70 K/uL 5.35   Lymph # 1.00 - 4.80 K/uL 1.50   Mono # 0.00 - 0.80 K/uL 0.52   Eos # 0.00 - 0.50 K/uL 0.20   Baso # 0.00 - 0.20 K/uL 0.07   Immature Grans (Abs) 0.00 - 0.04 K/uL 0.03   nRBC <=0.0 % 0.0   NUCLEATED RBC ABSOLUTE <=0.00 x10e3/uL 0.00   Differential Method  Auto   (L): Data is abnormally low  (H): Data is abnormally high

## 2025-01-23 NOTE — PT/OT/SLP PROGRESS
Occupational Therapy   Treatment    Name: Vy Hussein  MRN: [id number]  Admitting Diagnosis:  Muscular weakness       Recommendations:     Discharge Recommendations: Moderate Intensity Therapy  Discharge Equipment Recommendations:  walker, rolling  Barriers to discharge:       Assessment:     Vy Hussein is a 84 y.o. female with a medical diagnosis of Muscular weakness. Performance deficits affecting function are weakness, impaired endurance, impaired self care skills, impaired functional mobility, impaired balance, decreased lower extremity function, decreased safety awareness.       Rehab Prognosis:  Good; patient would benefit from acute skilled OT services to address these deficits and reach maximum level of function.       Plan:     Patient to be seen 5 x/week to address the above listed problems via therapeutic exercises, therapeutic activities, self-care/home management  Plan of Care Expires:    Plan of Care Reviewed with: patient    Subjective     Chief Complaint: Decreased mobility  Patient/Family Comments/goals: increased strength  Pain/Comfort:  Pain Rating 1: 0/10    Objective:     Communicated with: RN prior to session.  Patient found up in chair with   upon OT entry to room.     General Precautions: Standard, fall    Orthopedic Precautions:   Braces:    Respiratory Status: Room air     Occupational Performance:       Functional Mobility/Transfers:  Patient completed Sit <> Stand Transfer with minimum assistance  with  rolling walker   Functional Mobility: min a with ROLLING WALKER and extra time needed    Activities of Daily Living:  Feeding S/u   Grooming S/u   Bathing Min a   UB dsg Min a   LB dsg Mod a   Toileting S/u   Toilet t/f Mod a         AMPAC 6 Click ADL: 16    Treatment & Education:  Pt educated on OT role/POC.   Importance of OOB activity with staff assistance.  Importance of sitting up in the chair throughout the day as tolerated, especially for meals   Safety during functional  t/f and mobility  Importance of assisting with self-care activities     Patient completed the following for increased strength to increase I with ADLs: 3# dowel- shoulder press, chest press, bicep curls x 40, UBE 8 min; yellow theraflex x 40 both ways, yellow theraband- scapular retraction x 40      Patient left up in chair with call button in reach and chair alarm on    GOALS:   Multidisciplinary Problems       Occupational Therapy Goals          Problem: Occupational Therapy    Goal Priority Disciplines Outcome Interventions   Occupational Therapy Goal     OT, PT/OT Progressing    Description: STG:  Pt will perform grooming with setup  Pt will bathe with Mod I  Pt will perform UE dressing with I after set up  Pt will perform LE dressing with Mod I  Pt will sit EOB x 15 min with no assistance  Pt will transfer bed/chair/bsc with Mod I  Pt will perform standing task x 15 min with supervision assistance  Pt will tolerate 45 minutes of tx without fatigue      LT.Restore to max I with self care and mobility.                         Time Tracking:     OT Date of Treatment: 25  OT Start Time: 1017  OT Stop Time: 1049  OT Total Time (min): 32 min    Billable Minutes:Therapeutic Exercise 32 min  Lizeth Negron OTR/L            Number of PHILIP visits since last OT visit: 2025

## 2025-01-23 NOTE — PLAN OF CARE
Problem: Adult Inpatient Plan of Care  Goal: Plan of Care Review  Outcome: Progressing  Goal: Optimal Comfort and Wellbeing  Outcome: Progressing     Problem: Fall Injury Risk  Goal: Absence of Fall and Fall-Related Injury  Outcome: Progressing  Intervention: Identify and Manage Contributors  Flowsheets (Taken 1/23/2025 0409)  Self-Care Promotion:   independence encouraged   BADL personal objects within reach   BADL personal routines maintained   adaptive equipment use encouraged  Medication Review/Management: medications reviewed  Intervention: Promote Injury-Free Environment  Flowsheets (Taken 1/23/2025 0409)  Safety Promotion/Fall Prevention:   assistive device/personal item within reach   diversional activities provided   Fall Risk reviewed with patient/family   Fall Risk signage in place   lighting adjusted   medications reviewed   muscle strengthening facilitated   nonskid shoes/socks when out of bed   room near unit station   side rails raised x 2     Problem: Communication Impairment  Goal: Effective Communication Skills  Outcome: Progressing  Intervention: Optimize Communication Skills  Flowsheets (Taken 1/23/2025 0409)  Communication Enhancement Strategies:   call light answered in person   extra time allowed for response

## 2025-01-24 PROCEDURE — 99308 SBSQ NF CARE LOW MDM 20: CPT | Mod: ,,, | Performed by: FAMILY MEDICINE

## 2025-01-24 PROCEDURE — 11000004 HC SNF PRIVATE

## 2025-01-24 PROCEDURE — 92507 TX SP LANG VOICE COMM INDIV: CPT

## 2025-01-24 PROCEDURE — 94761 N-INVAS EAR/PLS OXIMETRY MLT: CPT

## 2025-01-24 PROCEDURE — 97530 THERAPEUTIC ACTIVITIES: CPT | Mod: CQ

## 2025-01-24 PROCEDURE — 97110 THERAPEUTIC EXERCISES: CPT

## 2025-01-24 PROCEDURE — 25000003 PHARM REV CODE 250: Performed by: FAMILY MEDICINE

## 2025-01-24 PROCEDURE — 97110 THERAPEUTIC EXERCISES: CPT | Mod: CQ

## 2025-01-24 RX ADMIN — POTASSIUM CHLORIDE 10 MEQ: 750 TABLET, FILM COATED, EXTENDED RELEASE ORAL at 08:01

## 2025-01-24 RX ADMIN — APIXABAN 2.5 MG: 2.5 TABLET, FILM COATED ORAL at 09:01

## 2025-01-24 RX ADMIN — POTASSIUM CHLORIDE 10 MEQ: 750 TABLET, FILM COATED, EXTENDED RELEASE ORAL at 09:01

## 2025-01-24 RX ADMIN — HYDROCORTISONE: 25 CREAM TOPICAL at 09:01

## 2025-01-24 RX ADMIN — ATORVASTATIN CALCIUM 20 MG: 20 TABLET, FILM COATED ORAL at 09:01

## 2025-01-24 RX ADMIN — APIXABAN 2.5 MG: 2.5 TABLET, FILM COATED ORAL at 08:01

## 2025-01-24 RX ADMIN — THERA TABS 1 TABLET: TAB at 09:01

## 2025-01-24 RX ADMIN — HYDROCORTISONE: 25 CREAM TOPICAL at 08:01

## 2025-01-24 RX ADMIN — ASPIRIN 81 MG CHEWABLE TABLET 81 MG: 81 TABLET CHEWABLE at 09:01

## 2025-01-24 RX ADMIN — DONEPEZIL HYDROCHLORIDE 5 MG: 5 TABLET ORAL at 08:01

## 2025-01-24 NOTE — PLAN OF CARE
Problem: Adult Inpatient Plan of Care  Goal: Patient-Specific Goal (Individualized)  Outcome: Progressing  Goal: Absence of Hospital-Acquired Illness or Injury  Outcome: Progressing  Intervention: Identify and Manage Fall Risk  Flowsheets (Taken 1/24/2025 1352)  Safety Promotion/Fall Prevention:   assistive device/personal item within reach   Fall Risk signage in place   Fall Risk reviewed with patient/family   in recliner, wheels locked   medications reviewed   nonskid shoes/socks when out of bed  Intervention: Prevent Skin Injury  Flowsheets (Taken 1/24/2025 1352)  Skin Protection:   incontinence pads utilized   protective footwear used  Device Skin Pressure Protection:   absorbent pad utilized/changed   adhesive use limited   pressure points protected  Intervention: Prevent and Manage VTE (Venous Thromboembolism) Risk  Flowsheets (Taken 1/24/2025 1352)  VTE Prevention/Management:   ambulation promoted   bleeding risk assessed   bleeding precautions maintained   bleeding risk factor(s) identified, provider notified   fluids promoted  Intervention: Prevent Infection  Flowsheets (Taken 1/24/2025 1352)  Infection Prevention:   environmental surveillance performed   equipment surfaces disinfected   hand hygiene promoted   rest/sleep promoted   single patient room provided

## 2025-01-24 NOTE — PLAN OF CARE
Problem: Adult Inpatient Plan of Care  Goal: Plan of Care Review  Outcome: Progressing  Goal: Optimal Comfort and Wellbeing  Outcome: Progressing     Problem: Skin Injury Risk Increased  Goal: Skin Health and Integrity  Outcome: Progressing  Intervention: Optimize Skin Protection  Flowsheets (Taken 1/24/2025 0412)  Pressure Reduction Techniques: frequent weight shift encouraged  Skin Protection:   incontinence pads utilized   protective footwear used  Activity Management: Ambulated in room - L4  Intervention: Promote and Optimize Oral Intake  Flowsheets (Taken 1/24/2025 0412)  Oral Nutrition Promotion: physical activity promoted     Problem: Fall Injury Risk  Goal: Absence of Fall and Fall-Related Injury  Outcome: Progressing  Intervention: Identify and Manage Contributors  Flowsheets (Taken 1/24/2025 0412)  Self-Care Promotion:   independence encouraged   BADL personal objects within reach   BADL personal routines maintained   adaptive equipment use encouraged  Medication Review/Management: medications reviewed  Intervention: Promote Injury-Free Environment  Flowsheets (Taken 1/24/2025 0412)  Safety Promotion/Fall Prevention:   assistive device/personal item within reach   diversional activities provided   Fall Risk reviewed with patient/family   Fall Risk signage in place   instructed to call staff for mobility   lighting adjusted   medications reviewed   muscle strengthening facilitated   nonskid shoes/socks when out of bed   patient expresses understanding of fall risk and prevention   room near unit station   side rails raised x 2     Problem: Communication Impairment  Goal: Effective Communication Skills  Outcome: Progressing  Intervention: Optimize Communication Skills  Flowsheets (Taken 1/24/2025 0412)  Communication Enhancement Strategies:   call light answered in person   extra time allowed for response

## 2025-01-24 NOTE — PT/OT/SLP PROGRESS
Physical Therapy Treatment    Patient Name:  Vy Hussein   MRN:  [id number]    Recommendations:     Discharge Recommendations: Moderate Intensity Therapy  Discharge Equipment Recommendations: walker, rolling  Barriers to discharge: None    Assessment:     Vy Hussein is a 84 y.o. female admitted with a medical diagnosis of Muscular weakness.  She presents with the following impairments/functional limitations: weakness, impaired endurance .  Pt pleasant and participates well with treatment; all pt goals have been met-- recommend d/c from PT for this reason.    Rehab Prognosis: Good; patient would benefit from acute skilled PT services to address these deficits and reach maximum level of function.    Recent Surgery: * No surgery found *      Plan:     During this hospitalization, patient to be seen 5 x/week to address the identified rehab impairments via gait training, therapeutic exercises and progress toward the following goals:    Plan of Care Expires:  01/28/25    Subjective     Chief Complaint: none  Patient/Family Comments/goals: agrees to PT Tx  Pain/Comfort:         Objective:     Communicated with patient prior to session.  Patient found ambulatory in room/aponte with chair check upon PT entry to room.     General Precautions: Standard, fall  Orthopedic Precautions: RLE weight bearing as tolerated  Braces: N/A  Respiratory Status: Room air     Functional Mobility:  Transfers:     Sit to Stand:  modified independence with rolling walker  Bed to Chair: modified independence with  rolling walker  using  Step Transfer  Gait: 150ft x 2 with rolling walker, mod independent to SVN; pt relies on walker for support due to general weakness; slow, steady pace with reciprocal pattern  Balance: independent with sitting balance; good with walker for dynamic mobility       AM-PAC 6 CLICK MOBILITY  Turning over in bed (including adjusting bedclothes, sheets and blankets)?: 4  Sitting down on and standing up from a  chair with arms (e.g., wheelchair, bedside commode, etc.): 4  Moving from lying on back to sitting on the side of the bed?: 4  Moving to and from a bed to a chair (including a wheelchair)?: 4  Need to walk in hospital room?: 4  Climbing 3-5 steps with a railing?: 3 (only for safety)  Basic Mobility Total Score: 23       Treatment & Education:  Ambulation for endurance as noted above  Sit to stand x 5 repetitions with one upper extremity   Nustep x 8 minutes   Sitting BLE exercises x 20 repetitions each: marching, long arc quads, hip abduction, ankle rocking  Ambulation back to her room     Patient left sitting edge of bed with call button in reach..    GOALS:   Multidisciplinary Problems       Physical Therapy Goals          Problem: Physical Therapy    Goal Priority Disciplines Outcome Interventions   Physical Therapy Goal     PT, PT/OT Progressing    Description: Short term goals  1. Pt will require min assist for bed mobility  2. Pt will require min assist for sit to stand transfer   3. Pt will ambulate 25 feet with RW and CGA    Long term goals  1. Pt will be independent with all bed mobility  2. Pt will be independent/mod independent with sit to stand transfer  3. Pt will perform 5 sit to stands with only 1 UE for assistance  4. Pt will ambulate 50 feet with RW and CGA  5. Pt will be independent with ascending and descending 2 standard height steps with handrail                       Time Tracking:     PT Received On: 01/24/25  PT Start Time: 1300     PT Stop Time: 1340  PT Total Time (min): 40 min     Billable Minutes: Therapeutic Activity 15 and Therapeutic Exercise 25    Treatment Type: Treatment  PT/PTA: PTA     Number of PTA visits since last PT visit: 4     01/24/2025

## 2025-01-24 NOTE — PT/OT/SLP PROGRESS
Occupational Therapy   Treatment    Name: Vy Hussein  MRN: [id number]  Admitting Diagnosis:  Muscular weakness       Recommendations:     Discharge Recommendations: Moderate Intensity Therapy  Discharge Equipment Recommendations:  walker, rolling  Barriers to discharge:       Assessment:     Vy Hussein is a 84 y.o. female with a medical diagnosis of Muscular weakness. Performance deficits affecting function are weakness, impaired endurance, impaired self care skills, impaired functional mobility, impaired balance, decreased lower extremity function, decreased safety awareness.       Rehab Prognosis:  Good; patient would benefit from acute skilled OT services to address these deficits and reach maximum level of function.       Plan:   Patient has met goals and has plateaued with therapy. OT recommends discharge home with home health.       Patient to be seen 5 x/week to address the above listed problems via self-care/home management, therapeutic activities, therapeutic exercises  Plan of Care Expires:    Plan of Care Reviewed with: patient    Subjective     Chief Complaint: Decreased mobility  Patient/Family Comments/goals: increased strength  Pain/Comfort:  Pain Rating 1: 0/10    Objective:     Communicated with: RN prior to session.  Patient found up in chair with   upon OT entry to room.     General Precautions: Standard, fall    Orthopedic Precautions:   Braces:    Respiratory Status: Room air     Occupational Performance:       Functional Mobility/Transfers:  Patient completed Sit <> Stand Transfer with minimum assistance  with  rolling walker   Functional Mobility: min a with ROLLING WALKER and extra time needed    Activities of Daily Living:  Feeding S/u   Grooming S/u   Bathing Min a   UB dsg Min a   LB dsg Mod a   Toileting S/u   Toilet t/f Mod a         AMPAC 6 Click ADL: 24    Treatment & Education:  Pt educated on OT role/POC.   Importance of OOB activity with staff assistance.  Importance of  sitting up in the chair throughout the day as tolerated, especially for meals   Safety during functional t/f and mobility  Importance of assisting with self-care activities     Patient completed the following for increased strength to increase I with ADLs: 3# dowel- shoulder press, chest press, bicep curls x 40, UBE 8 min; yellow theraflex x 40 both ways, yellow theraband- scapular retraction x 40      Patient left up in chair with call button in reach and chair alarm on    GOALS:   Multidisciplinary Problems       Occupational Therapy Goals          Problem: Occupational Therapy    Goal Priority Disciplines Outcome Interventions   Occupational Therapy Goal     OT, PT/OT Progressing    Description: STG:  Pt will perform grooming with setup  Pt will bathe with Mod I  Pt will perform UE dressing with I after set up  Pt will perform LE dressing with Mod I  Pt will sit EOB x 15 min with no assistance  Pt will transfer bed/chair/bsc with Mod I  Pt will perform standing task x 15 min with supervision assistance  Pt will tolerate 45 minutes of tx without fatigue      LT.Restore to max I with self care and mobility.                         Time Tracking:     OT Date of Treatment: 25  OT Start Time: 1034  OT Stop Time: 1058  OT Total Time (min): 24 min    Billable Minutes:Therapeutic Exercise 24 min  Lizeth Negron, OTR/L            Number of PHILIP visits since last OT visit: 2025

## 2025-01-24 NOTE — SUBJECTIVE & OBJECTIVE
Interval History: will continue present treatment.    Review of Systems  Objective:     Vital Signs (Most Recent):  Temp: 97.4 °F (36.3 °C) (01/24/25 0825)  Pulse: 89 (01/24/25 0825)  Resp: 18 (01/24/25 0825)  BP: 114/76 (01/24/25 0825)  SpO2: 97 % (01/24/25 0825) Vital Signs (24h Range):  Temp:  [97.4 °F (36.3 °C)-98.1 °F (36.7 °C)] 97.4 °F (36.3 °C)  Pulse:  [83-89] 89  Resp:  [18] 18  SpO2:  [96 %-99 %] 97 %  BP: (114-147)/(71-76) 114/76     Weight: 40.1 kg (88 lb 6.4 oz)  Body mass index is 14.27 kg/m².    Intake/Output Summary (Last 24 hours) at 1/24/2025 0902  Last data filed at 1/23/2025 1752  Gross per 24 hour   Intake 360 ml   Output --   Net 360 ml         Physical Exam        Significant Labs: All pertinent labs within the past 24 hours have been reviewed.    Significant Imaging: I have reviewed all pertinent imaging results/findings within the past 24 hours.

## 2025-01-24 NOTE — PROGRESS NOTES
[facility]  Hospital Medicine  Progress Note    Patient Name: Vy Hussein  MRN: [id number]  Patient Class: IP- Swing   Admission Date: 12/24/2024  Length of Stay: 31 days  Attending Physician: Elizabeth Castro MD  Primary Care Provider: Flora Brennan MD        Subjective     Principal Problem:Muscular weakness        HPI:  No notes on file    Overview/Hospital Course:  12/27/24 - up in chair. No complaints voiced.    12/30/24 - doing well today. No complaints voiced.    1/3/25 - has a cough this AM. Has cough syrup ordered. Will continue present treatment.    1/6/25 - doing well. To have staples removed today.    1/10/25 - doing well. Will continue present treatment.    1/13/25 - doing well. Will continue present treatment.    1/14/25 - had 3 diarrheal stools last night. Orders revised.    1/15/25 - still with diarrhea. Orders revised.    1/17/25 - feels better. Hemoglobin has dropped. Orders revised.    1/20/25 - doing well. No complaints.    1/24/25 - pt. Is doing well. Will continue present treatment.    Interval History: will continue present treatment.    Review of Systems  Objective:     Vital Signs (Most Recent):  Temp: 97.4 °F (36.3 °C) (01/24/25 0825)  Pulse: 89 (01/24/25 0825)  Resp: 18 (01/24/25 0825)  BP: 114/76 (01/24/25 0825)  SpO2: 97 % (01/24/25 0825) Vital Signs (24h Range):  Temp:  [97.4 °F (36.3 °C)-98.1 °F (36.7 °C)] 97.4 °F (36.3 °C)  Pulse:  [83-89] 89  Resp:  [18] 18  SpO2:  [96 %-99 %] 97 %  BP: (114-147)/(71-76) 114/76     Weight: 40.1 kg (88 lb 6.4 oz)  Body mass index is 14.27 kg/m².    Intake/Output Summary (Last 24 hours) at 1/24/2025 0902  Last data filed at 1/23/2025 1752  Gross per 24 hour   Intake 360 ml   Output --   Net 360 ml         Physical Exam        Significant Labs: All pertinent labs within the past 24 hours have been reviewed.    Significant Imaging: I have reviewed all pertinent imaging results/findings within the past 24  hours.    Assessment and Plan     No notes have been filed under this hospital service.  Service: Hospital Medicine    VTE Risk Mitigation (From admission, onward)           Ordered     apixaban tablet 2.5 mg  2 times daily         12/25/24 1056     IP VTE HIGH RISK PATIENT  Once         12/24/24 1544                    Discharge Planning   REESE:      Code Status: Prior   Medical Readiness for Discharge Date:   Discharge Plan A: Home, Home Health                Please place Justification for DME        Ema Chamorro MD  Department of Hospital Medicine   [facility]

## 2025-01-24 NOTE — PT/OT/SLP PROGRESS
"Speech Language Pathology Treatment    Patient Name:  Vy Hussein   MRN:  [id number]  Admitting Diagnosis: Muscular weakness    Recommendations:                 General Recommendations:  Speech/language therapy  Diet recommendations:   ,     Aspiration Precautions:       General Precautions: Standard,    Communication strategies:  provide increased time to answer and go to room if call light pushed    Assessment:     Vy Hussein is a 84 y.o. female with an SLP diagnosis of Aphasia.  She presents with expressive aphasia.    Subjective       Patient goals: Patient will be Independently oriented x4, 5/5 therapy sessions.   Patient answered "wh" questions with 80% accuracy Independently.   Patient completed word finding tasks with 75% accuracy Independently.   Patient completed divergent naming tasks with 75% accuracy Independently.   Patient completed convergent naming tasks with 80% accuracy Independently.   Patient completed problem solving tasks with 80% accuracy Independently.   Patient completed verbal reasoning tasks with 80% accuracy Independently.   Patient completed short term memory tasks with 80% accuracy Independently.      When pt uses slow speech; pt is intelligible.    Pain/Comfort:  Pain Rating 1: 0/10    Respiratory Status: Room air    Objective:     Has the patient been evaluated by SLP for swallowing?   No  Keep patient NPO? No   Current Respiratory Status:            Goals:   Multidisciplinary Problems       SLP Goals       Not on file                    Plan:     Patient to be seen:  3 x/week   Plan of Care expires:  01/31/25  Plan of Care reviewed with:  patient   SLP Follow-Up:  Yes       Discharge recommendations:  Moderate Intensity Therapy   Barriers to Discharge:  Safety Awareness      Time Tracking:     SLP Treatment Date:   01/24/25  Speech Start Time:  0735  Speech Stop Time:  0835     Speech Total Time (min):  60 min    Billable Minutes: Speech Therapy Individual  "     01/24/2025    Jenni Corado MSCCC-SLP

## 2025-01-25 PROCEDURE — 25000003 PHARM REV CODE 250: Performed by: FAMILY MEDICINE

## 2025-01-25 PROCEDURE — 94761 N-INVAS EAR/PLS OXIMETRY MLT: CPT

## 2025-01-25 PROCEDURE — 11000004 HC SNF PRIVATE

## 2025-01-25 RX ADMIN — POTASSIUM CHLORIDE 10 MEQ: 750 TABLET, FILM COATED, EXTENDED RELEASE ORAL at 08:01

## 2025-01-25 RX ADMIN — APIXABAN 2.5 MG: 2.5 TABLET, FILM COATED ORAL at 08:01

## 2025-01-25 RX ADMIN — HYDROCORTISONE: 25 CREAM TOPICAL at 08:01

## 2025-01-25 RX ADMIN — ASPIRIN 81 MG CHEWABLE TABLET 81 MG: 81 TABLET CHEWABLE at 08:01

## 2025-01-25 RX ADMIN — ATORVASTATIN CALCIUM 20 MG: 20 TABLET, FILM COATED ORAL at 08:01

## 2025-01-25 RX ADMIN — DONEPEZIL HYDROCHLORIDE 5 MG: 5 TABLET ORAL at 08:01

## 2025-01-25 RX ADMIN — THERA TABS 1 TABLET: TAB at 08:01

## 2025-01-25 NOTE — PLAN OF CARE
Problem: Adult Inpatient Plan of Care  Goal: Patient-Specific Goal (Individualized)  Outcome: Progressing  Goal: Optimal Comfort and Wellbeing  Outcome: Progressing  Intervention: Monitor Pain and Promote Comfort  Flowsheets (Taken 1/25/2025 1550)  Pain Management Interventions:   care clustered   medication offered   position adjusted   pillow support provided  Intervention: Provide Person-Centered Care  Flowsheets (Taken 1/25/2025 1550)  Trust Relationship/Rapport:   care explained   choices provided   emotional support provided   empathic listening provided   questions answered   questions encouraged   reassurance provided   thoughts/feelings acknowledged     Problem: Fall Injury Risk  Goal: Absence of Fall and Fall-Related Injury  Outcome: Progressing  Intervention: Identify and Manage Contributors  Flowsheets (Taken 1/25/2025 1550)  Self-Care Promotion:   independence encouraged   BADL personal objects within reach   BADL personal routines maintained   meal set-up provided   adaptive equipment use encouraged  Medication Review/Management:   medications reviewed   high-risk medications identified  Intervention: Promote Injury-Free Environment  Flowsheets (Taken 1/25/2025 1550)  Safety Promotion/Fall Prevention:   assistive device/personal item within reach   Fall Risk reviewed with patient/family   Fall Risk signage in place   high risk medications identified

## 2025-01-25 NOTE — NURSING
Pt resting in bed with eyes closed. NADN. Respirations even, unlabored. Call bell near. Side rails up x2. Will continue to monitor.

## 2025-01-26 PROCEDURE — 25000003 PHARM REV CODE 250: Performed by: FAMILY MEDICINE

## 2025-01-26 PROCEDURE — 11000004 HC SNF PRIVATE

## 2025-01-26 PROCEDURE — 94761 N-INVAS EAR/PLS OXIMETRY MLT: CPT

## 2025-01-26 RX ADMIN — THERA TABS 1 TABLET: TAB at 08:01

## 2025-01-26 RX ADMIN — POTASSIUM CHLORIDE 10 MEQ: 750 TABLET, FILM COATED, EXTENDED RELEASE ORAL at 08:01

## 2025-01-26 RX ADMIN — APIXABAN 2.5 MG: 2.5 TABLET, FILM COATED ORAL at 08:01

## 2025-01-26 RX ADMIN — ATORVASTATIN CALCIUM 20 MG: 20 TABLET, FILM COATED ORAL at 08:01

## 2025-01-26 RX ADMIN — HYDROCODONE BITARTRATE AND ACETAMINOPHEN 1 TABLET: 5; 325 TABLET ORAL at 12:01

## 2025-01-26 RX ADMIN — HYDROCORTISONE: 25 CREAM TOPICAL at 08:01

## 2025-01-26 RX ADMIN — DONEPEZIL HYDROCHLORIDE 5 MG: 5 TABLET ORAL at 08:01

## 2025-01-26 RX ADMIN — ASPIRIN 81 MG CHEWABLE TABLET 81 MG: 81 TABLET CHEWABLE at 08:01

## 2025-01-26 NOTE — PLAN OF CARE
Problem: Adult Inpatient Plan of Care  Goal: Patient-Specific Goal (Individualized)  Outcome: Progressing     Problem: Skin Injury Risk Increased  Goal: Skin Health and Integrity  Outcome: Progressing  Intervention: Optimize Skin Protection  Flowsheets (Taken 1/26/2025 1441)  Pressure Reduction Techniques:   frequent weight shift encouraged   heels elevated off bed   positioned off wounds   pressure points protected   sit time limited to 2 hours  Pressure Reduction Devices:   foam padding utilized   chair cushion utilized  Skin Protection:   incontinence pads utilized   protective footwear used  Activity Management: Ambulated in room - L4  Intervention: Promote and Optimize Oral Intake  Flowsheets (Taken 1/26/2025 1441)  Oral Nutrition Promotion:   physical activity promoted   rest periods promoted  Nutrition Interventions: meal set-up provided

## 2025-01-27 LAB
ANION GAP SERPL CALCULATED.3IONS-SCNC: 12 MMOL/L (ref 7–16)
BASOPHILS # BLD AUTO: 0.06 K/UL (ref 0–0.2)
BASOPHILS NFR BLD AUTO: 0.9 % (ref 0–1)
BUN SERPL-MCNC: 23 MG/DL (ref 10–20)
BUN/CREAT SERPL: 33 (ref 6–20)
CALCIUM SERPL-MCNC: 9.6 MG/DL (ref 8.4–10.2)
CHLORIDE SERPL-SCNC: 106 MMOL/L (ref 98–107)
CO2 SERPL-SCNC: 29 MMOL/L (ref 23–31)
CREAT SERPL-MCNC: 0.7 MG/DL (ref 0.55–1.02)
DIFFERENTIAL METHOD BLD: ABNORMAL
EGFR (NO RACE VARIABLE) (RUSH/TITUS): 85 ML/MIN/1.73M2
EOSINOPHIL # BLD AUTO: 0.18 K/UL (ref 0–0.5)
EOSINOPHIL NFR BLD AUTO: 2.6 % (ref 1–4)
ERYTHROCYTE [DISTWIDTH] IN BLOOD BY AUTOMATED COUNT: 15.2 % (ref 11.5–14.5)
GLUCOSE SERPL-MCNC: 86 MG/DL (ref 82–115)
HCT VFR BLD AUTO: 35.4 % (ref 38–47)
HGB BLD-MCNC: 11.4 G/DL (ref 12–16)
IMM GRANULOCYTES # BLD AUTO: 0.02 K/UL (ref 0–0.04)
IMM GRANULOCYTES NFR BLD: 0.3 % (ref 0–0.4)
LYMPHOCYTES # BLD AUTO: 1.28 K/UL (ref 1–4.8)
LYMPHOCYTES NFR BLD AUTO: 18.6 % (ref 27–41)
MAGNESIUM SERPL-MCNC: 2.4 MG/DL (ref 1.6–2.6)
MCH RBC QN AUTO: 29.5 PG (ref 27–31)
MCHC RBC AUTO-ENTMCNC: 32.2 G/DL (ref 32–36)
MCV RBC AUTO: 91.7 FL (ref 80–96)
MONOCYTES # BLD AUTO: 0.5 K/UL (ref 0–0.8)
MONOCYTES NFR BLD AUTO: 7.2 % (ref 2–6)
MPC BLD CALC-MCNC: 10.1 FL (ref 9.4–12.4)
NEUTROPHILS # BLD AUTO: 4.86 K/UL (ref 1.8–7.7)
NEUTROPHILS NFR BLD AUTO: 70.4 % (ref 53–65)
NRBC # BLD AUTO: 0 X10E3/UL
NRBC, AUTO (.00): 0 %
PHOSPHATE SERPL-MCNC: 3.4 MG/DL (ref 2.3–4.7)
PLATELET # BLD AUTO: 260 K/UL (ref 150–400)
POTASSIUM SERPL-SCNC: 3.8 MMOL/L (ref 3.5–5.1)
RBC # BLD AUTO: 3.86 M/UL (ref 4.2–5.4)
SODIUM SERPL-SCNC: 143 MMOL/L (ref 136–145)
WBC # BLD AUTO: 6.9 K/UL (ref 4.5–11)

## 2025-01-27 PROCEDURE — 97110 THERAPEUTIC EXERCISES: CPT | Mod: CQ

## 2025-01-27 PROCEDURE — 80048 BASIC METABOLIC PNL TOTAL CA: CPT | Performed by: FAMILY MEDICINE

## 2025-01-27 PROCEDURE — 11000004 HC SNF PRIVATE

## 2025-01-27 PROCEDURE — 85025 COMPLETE CBC W/AUTO DIFF WBC: CPT | Performed by: FAMILY MEDICINE

## 2025-01-27 PROCEDURE — 99308 SBSQ NF CARE LOW MDM 20: CPT | Mod: ,,, | Performed by: FAMILY MEDICINE

## 2025-01-27 PROCEDURE — 25000003 PHARM REV CODE 250: Performed by: FAMILY MEDICINE

## 2025-01-27 PROCEDURE — 97110 THERAPEUTIC EXERCISES: CPT

## 2025-01-27 PROCEDURE — 94761 N-INVAS EAR/PLS OXIMETRY MLT: CPT

## 2025-01-27 PROCEDURE — 83735 ASSAY OF MAGNESIUM: CPT | Performed by: FAMILY MEDICINE

## 2025-01-27 PROCEDURE — 36415 COLL VENOUS BLD VENIPUNCTURE: CPT | Performed by: FAMILY MEDICINE

## 2025-01-27 PROCEDURE — 92507 TX SP LANG VOICE COMM INDIV: CPT

## 2025-01-27 PROCEDURE — 84100 ASSAY OF PHOSPHORUS: CPT | Performed by: FAMILY MEDICINE

## 2025-01-27 RX ADMIN — ATORVASTATIN CALCIUM 20 MG: 20 TABLET, FILM COATED ORAL at 08:01

## 2025-01-27 RX ADMIN — HYDROCORTISONE: 25 CREAM TOPICAL at 08:01

## 2025-01-27 RX ADMIN — APIXABAN 2.5 MG: 2.5 TABLET, FILM COATED ORAL at 08:01

## 2025-01-27 RX ADMIN — THERA TABS 1 TABLET: TAB at 08:01

## 2025-01-27 RX ADMIN — ASPIRIN 81 MG CHEWABLE TABLET 81 MG: 81 TABLET CHEWABLE at 08:01

## 2025-01-27 RX ADMIN — DONEPEZIL HYDROCHLORIDE 5 MG: 5 TABLET ORAL at 08:01

## 2025-01-27 RX ADMIN — POTASSIUM CHLORIDE 10 MEQ: 750 TABLET, FILM COATED, EXTENDED RELEASE ORAL at 08:01

## 2025-01-27 NOTE — PLAN OF CARE
Problem: Adult Inpatient Plan of Care  Goal: Patient-Specific Goal (Individualized)  Outcome: Progressing  Goal: Optimal Comfort and Wellbeing  Outcome: Progressing  Intervention: Monitor Pain and Promote Comfort  Flowsheets (Taken 1/27/2025 1348)  Pain Management Interventions:   care clustered   pillow support provided   position adjusted  Intervention: Provide Person-Centered Care  Flowsheets (Taken 1/27/2025 1348)  Trust Relationship/Rapport:   care explained   choices provided   emotional support provided   empathic listening provided   questions answered   questions encouraged   reassurance provided   thoughts/feelings acknowledged     Problem: Fall Injury Risk  Goal: Absence of Fall and Fall-Related Injury  Outcome: Progressing  Intervention: Identify and Manage Contributors  Flowsheets (Taken 1/27/2025 1348)  Self-Care Promotion:   independence encouraged   BADL personal objects within reach   BADL personal routines maintained   meal set-up provided   adaptive equipment use encouraged  Medication Review/Management:   medications reviewed   high-risk medications identified  Intervention: Promote Injury-Free Environment  Flowsheets (Taken 1/27/2025 1348)  Safety Promotion/Fall Prevention:   assistive device/personal item within reach   Fall Risk signage in place   Fall Risk reviewed with patient/family   high risk medications identified   room near unit station

## 2025-01-27 NOTE — PT/OT/SLP PROGRESS
"Speech Language Pathology Treatment    Patient Name:  Vy Hussein   MRN:  [id number]  Admitting Diagnosis: Muscular weakness    Recommendations:                 General Recommendations:  Speech/language therapy  Diet recommendations:   ,     Aspiration Precautions:       General Precautions: Standard,    Communication strategies:  provide increased time to answer and go to room if call light pushed    Assessment:     Vy Hussein is a 84 y.o. female with an SLP diagnosis of Aphasia.  She presents with expressive aphasia.    Subjective       Patient goals: Patient will be Independently oriented x4, 5/5 therapy sessions.   Patient answered "wh" questions with 80% accuracy Independently.   Patient completed word finding tasks with 80% accuracy Independently.   Patient completed divergent naming tasks with 80% accuracy Independently.   Patient completed convergent naming tasks with 80% accuracy Independently.   Patient completed problem solving tasks with 80% accuracy Independently.   Patient completed verbal reasoning tasks with 80% accuracy Independently.   Patient completed short term memory tasks with 80% accuracy Independently.      When pt uses slow speech; pt is intelligible.    Pain/Comfort:       Respiratory Status: Room air    Objective:     Has the patient been evaluated by SLP for swallowing?   No  Keep patient NPO? No   Current Respiratory Status:            Goals:   Multidisciplinary Problems       SLP Goals       Not on file                    Plan:     Patient to be seen:  3 x/week   Plan of Care expires:  01/31/25  Plan of Care reviewed with:  patient   SLP Follow-Up:  Yes       Discharge recommendations:  Moderate Intensity Therapy   Barriers to Discharge:  Safety Awareness      Time Tracking:     SLP Treatment Date:   01/27/25  Speech Start Time:  1345  Speech Stop Time:  1443     Speech Total Time (min):  58 min    Billable Minutes: Speech Therapy Individual      01/27/2025    Jenni Corado" MSCCC-SLP

## 2025-01-27 NOTE — SUBJECTIVE & OBJECTIVE
Interval History: will continue present treatment.    Review of Systems  Objective:     Vital Signs (Most Recent):  Temp: 98.4 °F (36.9 °C) (01/27/25 0741)  Pulse: 82 (01/27/25 0741)  Resp: 18 (01/27/25 0741)  BP: 136/77 (01/27/25 0741)  SpO2: 97 % (01/27/25 0741) Vital Signs (24h Range):  Temp:  [97.7 °F (36.5 °C)-98.4 °F (36.9 °C)] 98.4 °F (36.9 °C)  Pulse:  [78-93] 82  Resp:  [16-20] 18  SpO2:  [95 %-98 %] 97 %  BP: (134-136)/(77-92) 136/77     Weight: 39.7 kg (87 lb 8.4 oz)  Body mass index is 14.13 kg/m².    Intake/Output Summary (Last 24 hours) at 1/27/2025 0803  Last data filed at 1/26/2025 2125  Gross per 24 hour   Intake 360 ml   Output --   Net 360 ml         Physical Exam        Significant Labs: All pertinent labs within the past 24 hours have been reviewed.    Significant Imaging: I have reviewed all pertinent imaging results/findings within the past 24 hours.

## 2025-01-27 NOTE — PT/OT/SLP PROGRESS
Physical Therapy Treatment    Patient Name:  Vy Hussein   MRN:  [id number]    Recommendations:     Discharge Recommendations: Moderate Intensity Therapy  Discharge Equipment Recommendations: walker, rolling  Barriers to discharge: None    Assessment:     Vy Hussein is a 84 y.o. female admitted with a medical diagnosis of Muscular weakness.  She presents with the following impairments/functional limitations: weakness, impaired endurance, impaired self care skills, impaired functional mobility, gait instability, impaired balance, decreased upper extremity function, decreased lower extremity function, orthopedic precautions .  Pt pleasant and participates well with treatment; all pt goals have been met-- recommend d/c from PT for this reason.    Rehab Prognosis: Good; patient would benefit from acute skilled PT services to address these deficits and reach maximum level of function.    Recent Surgery: * No surgery found *      Plan:     During this hospitalization, patient to be seen 5 x/week to address the identified rehab impairments via gait training, therapeutic exercises and progress toward the following goals:    Plan of Care Expires:  01/28/25    Subjective     Chief Complaint: none  Patient/Family Comments/goals: agrees to PT Tx  Pain/Comfort:  Pain Rating 1: 0/10      Objective:     Communicated with patient prior to session.  Patient found in rehab gym seated in [facility] after completing OT treatment session.       General Precautions: Standard, fall  Orthopedic Precautions: RLE weight bearing as tolerated  Braces: N/A  Respiratory Status: Room air     Functional Mobility:  Transfers:     Sit to Stand:  modified independence with rolling walker  Bed to Chair: modified independence with  rolling walker  using  Step Transfer  Gait: 150ft  with rolling walker, mod independent to SVN; pt relies on walker for support due to general weakness; slow, steady pace with reciprocal pattern  Balance:  "independent with sitting balance; good with walker for dynamic mobility       AM-PAC 6 CLICK MOBILITY  Turning over in bed (including adjusting bedclothes, sheets and blankets)?: 4  Sitting down on and standing up from a chair with arms (e.g., wheelchair, bedside commode, etc.): 4  Moving from lying on back to sitting on the side of the bed?: 4  Moving to and from a bed to a chair (including a wheelchair)?: 4  Need to walk in hospital room?: 4  Climbing 3-5 steps with a railing?: 3  Basic Mobility Total Score: 23       Treatment & Education:  Ambulation for endurance as noted above    Short Arc Quads' 2 x 10, 2#   Hip adduction with ball 30 x 3"   Sitting BLE exercises x 20 repetitions each: marching, long arc quads, seated hip rotation, 2#   Seated hip abduction 20 x red thera band  Seated Hamstring curls 20 x red thera band       Patient left sitting edge of bed with call button in reach..    GOALS:   Multidisciplinary Problems       Physical Therapy Goals          Problem: Physical Therapy    Goal Priority Disciplines Outcome Interventions   Physical Therapy Goal     PT, PT/OT Progressing    Description: Short term goals  1. Pt will require min assist for bed mobility  2. Pt will require min assist for sit to stand transfer   3. Pt will ambulate 25 feet with RW and CGA    Long term goals  1. Pt will be independent with all bed mobility  2. Pt will be independent/mod independent with sit to stand transfer  3. Pt will perform 5 sit to stands with only 1 UE for assistance  4. Pt will ambulate 50 feet with RW and CGA  5. Pt will be independent with ascending and descending 2 standard height steps with handrail                       Time Tracking:     PT Received On: 01/27/25  PT Start Time: 0951     PT Stop Time: 1015  PT Total Time (min): 24 min     Billable Minutes:  Therapeutic exercise 24     Treatment Type: Treatment  PT/PTA: PTA     Number of PTA visits since last PT visit: 5     Continue Plan of Care per PT " order to progress patient toward rehab goals as tolerated by patient.   PERLA Rosenthal   01/27/2025

## 2025-01-27 NOTE — PROGRESS NOTES
[facility]  Hospital Medicine  Progress Note    Patient Name: Vy Hussein  MRN: [id number]  Patient Class: IP- Swing   Admission Date: 12/24/2024  Length of Stay: 34 days  Attending Physician: Elizabeth Castro MD  Primary Care Provider: Flora Bernnan MD        Subjective     Principal Problem:Muscular weakness        HPI:  No notes on file    Overview/Hospital Course:  12/27/24 - up in chair. No complaints voiced.    12/30/24 - doing well today. No complaints voiced.    1/3/25 - has a cough this AM. Has cough syrup ordered. Will continue present treatment.    1/6/25 - doing well. To have staples removed today.    1/10/25 - doing well. Will continue present treatment.    1/13/25 - doing well. Will continue present treatment.    1/14/25 - had 3 diarrheal stools last night. Orders revised.    1/15/25 - still with diarrhea. Orders revised.    1/17/25 - feels better. Hemoglobin has dropped. Orders revised.    1/20/25 - doing well. No complaints.    1/24/25 - pt. Is doing well. Will continue present treatment.    1/27/25 - doing well no complaints voiced.    Interval History: will continue present treatment.    Review of Systems  Objective:     Vital Signs (Most Recent):  Temp: 98.4 °F (36.9 °C) (01/27/25 0741)  Pulse: 82 (01/27/25 0741)  Resp: 18 (01/27/25 0741)  BP: 136/77 (01/27/25 0741)  SpO2: 97 % (01/27/25 0741) Vital Signs (24h Range):  Temp:  [97.7 °F (36.5 °C)-98.4 °F (36.9 °C)] 98.4 °F (36.9 °C)  Pulse:  [78-93] 82  Resp:  [16-20] 18  SpO2:  [95 %-98 %] 97 %  BP: (134-136)/(77-92) 136/77     Weight: 39.7 kg (87 lb 8.4 oz)  Body mass index is 14.13 kg/m².    Intake/Output Summary (Last 24 hours) at 1/27/2025 0855  Last data filed at 1/26/2025 2128  Gross per 24 hour   Intake 360 ml   Output --   Net 360 ml         Physical Exam        Significant Labs: All pertinent labs within the past 24 hours have been reviewed.    Significant Imaging: I have reviewed all  pertinent imaging results/findings within the past 24 hours.    Assessment and Plan     No notes have been filed under this hospital service.  Service: Hospital Medicine    VTE Risk Mitigation (From admission, onward)           Ordered     apixaban tablet 2.5 mg  2 times daily         12/25/24 1056     IP VTE HIGH RISK PATIENT  Once         12/24/24 1544                    Discharge Planning   REESE:      Code Status: Prior   Medical Readiness for Discharge Date:   Discharge Plan A: Home, Home Health                Please place Justification for DME        Ema Chamorro MD  Department of Hospital Medicine   [facility]

## 2025-01-27 NOTE — PT/OT/SLP PROGRESS
Occupational Therapy   Treatment    Name: Vy Hussein  MRN: [id number]  Admitting Diagnosis:  Muscular weakness       Recommendations:     Discharge Recommendations: Moderate Intensity Therapy  Discharge Equipment Recommendations:  walker, rolling  Barriers to discharge:       Assessment:     Vy Hussein is a 84 y.o. female with a medical diagnosis of Muscular weakness. Performance deficits affecting function are weakness, impaired endurance, impaired self care skills, impaired functional mobility, impaired balance, decreased lower extremity function, decreased safety awareness.       Rehab Prognosis:  Good; patient would benefit from acute skilled OT services to address these deficits and reach maximum level of function.       Plan:   Patient has met goals and has plateaued with therapy. OT recommends discharge home with home health.       Patient to be seen 5 x/week to address the above listed problems via self-care/home management, therapeutic activities, therapeutic exercises  Plan of Care Expires:    Plan of Care Reviewed with: patient    Subjective     Chief Complaint: Decreased mobility  Patient/Family Comments/goals: increased strength  Pain/Comfort:  Pain Rating 1: 0/10    Objective:     Communicated with: RN prior to session.  Patient found up in chair with   upon OT entry to room.     General Precautions: Standard, fall    Orthopedic Precautions:   Braces:    Respiratory Status: Room air     Occupational Performance:       Functional Mobility/Transfers:  Patient completed Sit <> Stand Transfer with minimum assistance  with  rolling walker   Functional Mobility: min a with ROLLING WALKER and extra time needed    Activities of Daily Living:  Feeding S/u   Grooming S/u   Bathing Min a   UB dsg Min a   LB dsg Mod a   Toileting S/u   Toilet t/f Mod a         AMPAC 6 Click ADL: 24    Treatment & Education:  Pt educated on OT role/POC.   Importance of OOB activity with staff assistance.  Importance of  sitting up in the chair throughout the day as tolerated, especially for meals   Safety during functional t/f and mobility  Importance of assisting with self-care activities     Patient completed the following for increased strength to increase I with ADLs: 3# dowel- shoulder press, chest press, bicep curls x 40, UBE 8 min; yellow theraflex x 40 both ways, yellow theraband- scapular retraction x 40      Patient left up in chair with chair alarm on and pta notified    GOALS:   Multidisciplinary Problems       Occupational Therapy Goals          Problem: Occupational Therapy    Goal Priority Disciplines Outcome Interventions   Occupational Therapy Goal     OT, PT/OT Progressing    Description: STG:  Pt will perform grooming with setup  Pt will bathe with Mod I  Pt will perform UE dressing with I after set up  Pt will perform LE dressing with Mod I  Pt will sit EOB x 15 min with no assistance  Pt will transfer bed/chair/bsc with Mod I  Pt will perform standing task x 15 min with supervision assistance  Pt will tolerate 45 minutes of tx without fatigue      LT.Restore to max I with self care and mobility.                         Time Tracking:     OT Date of Treatment: 25  OT Start Time: 921  OT Stop Time: 950  OT Total Time (min): 29 min    Billable Minutes:Therapeutic Exercise 29 min  Lizeth Negron, OTR/L            Number of [facility] visits since last OT visit: 2025

## 2025-01-28 PROCEDURE — 25000003 PHARM REV CODE 250: Performed by: FAMILY MEDICINE

## 2025-01-28 PROCEDURE — 92507 TX SP LANG VOICE COMM INDIV: CPT

## 2025-01-28 PROCEDURE — 97110 THERAPEUTIC EXERCISES: CPT

## 2025-01-28 PROCEDURE — 94761 N-INVAS EAR/PLS OXIMETRY MLT: CPT

## 2025-01-28 PROCEDURE — 11000004 HC SNF PRIVATE

## 2025-01-28 RX ADMIN — THERA TABS 1 TABLET: TAB at 09:01

## 2025-01-28 RX ADMIN — ATORVASTATIN CALCIUM 20 MG: 20 TABLET, FILM COATED ORAL at 09:01

## 2025-01-28 RX ADMIN — METHOCARBAMOL 500 MG: 500 TABLET ORAL at 09:01

## 2025-01-28 RX ADMIN — DONEPEZIL HYDROCHLORIDE 5 MG: 5 TABLET ORAL at 09:01

## 2025-01-28 RX ADMIN — ASPIRIN 81 MG CHEWABLE TABLET 81 MG: 81 TABLET CHEWABLE at 09:01

## 2025-01-28 RX ADMIN — HYDROCORTISONE: 25 CREAM TOPICAL at 09:01

## 2025-01-28 RX ADMIN — APIXABAN 2.5 MG: 2.5 TABLET, FILM COATED ORAL at 09:01

## 2025-01-28 RX ADMIN — POTASSIUM CHLORIDE 10 MEQ: 750 TABLET, FILM COATED, EXTENDED RELEASE ORAL at 09:01

## 2025-01-28 RX ADMIN — GUAIFENESIN 200 MG: 100 LIQUID ORAL at 05:01

## 2025-01-28 RX ADMIN — HYDROCORTISONE: 25 CREAM TOPICAL at 10:01

## 2025-01-28 NOTE — PT/OT/SLP PROGRESS
Physical Therapy Treatment    Patient Name:  Vy Hussein   MRN:  [id number]    Recommendations:     Discharge Recommendations: Low Intensity Therapy  Discharge Equipment Recommendations: walker, rolling  Barriers to discharge: None    Assessment:     Vy Hussein is a 84 y.o. female admitted with a medical diagnosis of Muscular weakness.  She presents with the following impairments/functional limitations: weakness, impaired endurance, impaired functional mobility, decreased lower extremity function, pain, decreased ROM. Pt able to tolerate progression of resistance on select activities today with reports of only muscle fatigue during the activities.     Rehab Prognosis: Good; patient would benefit from acute skilled PT services to address these deficits and reach maximum level of function.    Recent Surgery: * No surgery found *      Plan:     During this hospitalization, patient to be seen 5 x/week to address the identified rehab impairments via gait training, therapeutic activities, therapeutic exercises, neuromuscular re-education and progress toward the following goals:    Plan of Care Expires:  02/11/25    Subjective     Chief Complaint: pain, weakness, decreased mobility  Patient/Family Comments/goals: increase strength and mobility; decrease pain  Pain/Comfort:  Pain Rating 1: 6/10  Location - Side 1: Bilateral  Location - Orientation 1: lower  Location 1: hip  Pain Addressed 1: Cessation of Activity  Pain Rating Post-Intervention 1: 6/10      Objective:     Communicated with LPTA prior to session.  Patient found up in chair with chair check upon PT entry to room.     General Precautions: Standard, fall  Orthopedic Precautions: RLE weight bearing as tolerated  Braces: N/A  Respiratory Status: Room air     Functional Mobility:  Transfers:     Sit to Stand:  supervision with rolling walker  Gait: from PT department to room with no rest breaks; slow sangeeta      AM-PAC 6 CLICK MOBILITY  Turning over in  bed (including adjusting bedclothes, sheets and blankets)?: 4  Sitting down on and standing up from a chair with arms (e.g., wheelchair, bedside commode, etc.): 4  Moving from lying on back to sitting on the side of the bed?: 4  Moving to and from a bed to a chair (including a wheelchair)?: 4  Need to walk in hospital room?: 4  Climbing 3-5 steps with a railing?: 3  Basic Mobility Total Score: 23       Treatment & Education:  Seated marching with ankle weights: 20, 3 lb   LAQ: 20, each LE, 3 lb  Seated hip adduction isometric: 20 x 3 sec hold  Seated hip abduction: 20, red band  Seated ankle pumps: 30  SAQ: 20, each LE, 3 lb  SLR: 15 each LE  Glut sets: 20 x 3 sec hold  Ambulation as noted above    Patient left up in chair with call button in reach and chair alarm on..    GOALS:   Multidisciplinary Problems       Physical Therapy Goals          Problem: Physical Therapy    Goal Priority Disciplines Outcome Interventions   Physical Therapy Goal     PT, PT/OT Progressing    Description: Short term goals  1. Pt will require min assist for bed mobility  2. Pt will require min assist for sit to stand transfer   3. Pt will ambulate 25 feet with RW and CGA    Long term goals  1. Pt will be independent with all bed mobility  2. Pt will be independent/mod independent with sit to stand transfer  3. Pt will perform 5 sit to stands with only 1 UE for assistance  4. Pt will ambulate 50 feet with RW and CGA  5. Pt will be independent with ascending and descending 2 standard height steps with handrail                       DME Justifications:   Vy's mobility limitation cannot be sufficiently resolved by the use of a cane. Her functional mobility deficit can be sufficiently resolved with the use of a Rolling Walker. Patient's mobility limitation significantly impairs their ability to participate in one of more activities of daily living.  The use of a RW will significantly improve the patient's ability to participate in MRADLS  and the patient will use it on regular basis in the home.    Time Tracking:     PT Received On: 01/28/25  PT Start Time: 1320     PT Stop Time: 1355  PT Total Time (min): 35 min     Billable Minutes: Therapeutic Exercise 35 minutes    Treatment Type: 6th Visit  PT/PTA: PT     Number of PTA visits since last PT visit: 0     Zbigniew Corado PT, DPT   1/28/2025

## 2025-01-28 NOTE — PLAN OF CARE
Problem: Adult Inpatient Plan of Care  Goal: Plan of Care Review  Outcome: Progressing  Goal: Optimal Comfort and Wellbeing  Outcome: Progressing     Problem: Skin Injury Risk Increased  Goal: Skin Health and Integrity  Outcome: Progressing  Intervention: Optimize Skin Protection  Flowsheets (Taken 1/28/2025 0502)  Pressure Reduction Techniques: frequent weight shift encouraged  Skin Protection: incontinence pads utilized  Activity Management: Ambulated to bathroom - L4  Intervention: Promote and Optimize Oral Intake  Flowsheets (Taken 1/28/2025 0502)  Oral Nutrition Promotion: physical activity promoted     Problem: Fall Injury Risk  Goal: Absence of Fall and Fall-Related Injury  Outcome: Progressing  Intervention: Identify and Manage Contributors  Flowsheets (Taken 1/28/2025 0502)  Self-Care Promotion:   independence encouraged   BADL personal objects within reach   BADL personal routines maintained   adaptive equipment use encouraged  Medication Review/Management: medications reviewed  Intervention: Promote Injury-Free Environment  Flowsheets (Taken 1/28/2025 0502)  Safety Promotion/Fall Prevention:   assistive device/personal item within reach   diversional activities provided   Fall Risk reviewed with patient/family   Fall Risk signage in place   medications reviewed   muscle strengthening facilitated   nonskid shoes/socks when out of bed   room near unit station   side rails raised x 2     Problem: Communication Impairment  Goal: Effective Communication Skills  Outcome: Progressing  Intervention: Optimize Communication Skills  Flowsheets (Taken 1/28/2025 0502)  Communication Enhancement Strategies:   call light answered in person   extra time allowed for response

## 2025-01-28 NOTE — PT/OT/SLP PROGRESS
Occupational Therapy   Treatment    Name: Vy Hussein  MRN: [id number]  Admitting Diagnosis:  Muscular weakness       Recommendations:     Discharge Recommendations: Moderate Intensity Therapy  Discharge Equipment Recommendations:  walker, rolling  Barriers to discharge:       Assessment:     Vy Hussein is a 84 y.o. female with a medical diagnosis of Muscular weakness. Performance deficits affecting function are weakness, impaired endurance, impaired self care skills, impaired functional mobility, impaired balance, decreased lower extremity function, decreased safety awareness.       Rehab Prognosis:  Good; patient would benefit from acute skilled OT services to address these deficits and reach maximum level of function.       Plan:   Patient has met goals and has plateaued with therapy. OT recommends discharge home with home health.       Patient to be seen 5 x/week to address the above listed problems via self-care/home management, therapeutic activities, therapeutic exercises  Plan of Care Expires:    Plan of Care Reviewed with: patient    Subjective     Chief Complaint: Decreased mobility  Patient/Family Comments/goals: increased strength  Pain/Comfort:  Pain Rating 1: 6/10    Objective:     Communicated with: RN prior to session.  Patient found up in chair with   upon OT entry to room.     General Precautions: Standard, fall    Orthopedic Precautions:   Braces:    Respiratory Status: Room air     Occupational Performance:       Functional Mobility/Transfers:  Patient completed Sit <> Stand Transfer with minimum assistance  with  rolling walker   Functional Mobility: min a with ROLLING WALKER and extra time needed    Activities of Daily Living:  Feeding S/u   Grooming S/u   Bathing Min a   UB dsg Min a   LB dsg Mod a   Toileting S/u   Toilet t/f Mod a         AMPAC 6 Click ADL: 24    Treatment & Education:  Pt educated on OT role/POC.   Importance of OOB activity with staff assistance.  Importance of  sitting up in the chair throughout the day as tolerated, especially for meals   Safety during functional t/f and mobility  Importance of assisting with self-care activities     Patient completed the following for increased strength to increase I with ADLs: 3# dowel- shoulder press, chest press, bicep curls x 40, UBE 8 min; yellow theraflex x 40 both ways, yellow theraband- scapular retraction x 40      Patient left up in chair with chair alarm on and PT notified    GOALS:   Multidisciplinary Problems       Occupational Therapy Goals          Problem: Occupational Therapy    Goal Priority Disciplines Outcome Interventions   Occupational Therapy Goal     OT, PT/OT Progressing    Description: STG:  Pt will perform grooming with setup  Pt will bathe with Mod I  Pt will perform UE dressing with I after set up  Pt will perform LE dressing with Mod I  Pt will sit EOB x 15 min with no assistance  Pt will transfer bed/chair/bsc with Mod I  Pt will perform standing task x 15 min with supervision assistance  Pt will tolerate 45 minutes of tx without fatigue      LT.Restore to max I with self care and mobility.                         Time Tracking:     OT Date of Treatment: 25  OT Start Time: 1244  OT Stop Time: 1318  OT Total Time (min): 34 min    Billable Minutes:Therapeutic Exercise 34 min  Lizeth Negron, OTR/L            Number of [facility] visits since last OT visit: 2025

## 2025-01-28 NOTE — PLAN OF CARE
Problem: Adult Inpatient Plan of Care  Goal: Plan of Care Review  Outcome: Progressing  Goal: Absence of Hospital-Acquired Illness or Injury  Outcome: Progressing     Problem: Infection  Goal: Absence of Infection Signs and Symptoms  Outcome: Progressing

## 2025-01-28 NOTE — PT/OT/SLP PROGRESS
"Speech Language Pathology Treatment    Patient Name:  Vy Hussein   MRN:  [id number]  Admitting Diagnosis: Muscular weakness    Recommendations:                 General Recommendations:  Speech/language therapy  Diet recommendations:   ,     Aspiration Precautions:       General Precautions: Standard,    Communication strategies:  provide increased time to answer and go to room if call light pushed    Assessment:     Vy Hussein is a 84 y.o. female with an SLP diagnosis of Aphasia.  She presents with expressive aphasia.    Subjective       Patient goals: Patient will be Independently oriented x4, 5/5 therapy sessions.   Patient answered "wh" questions with 80% accuracy Independently.   Patient completed word finding tasks with 80% accuracy Independently.   Patient completed divergent naming tasks with 80% accuracy Independently.   Patient completed convergent naming tasks with 80% accuracy Independently.   Patient completed problem solving tasks with 80% accuracy Independently.   Patient completed verbal reasoning tasks with 80% accuracy Independently.   Patient completed short term memory tasks with 80% accuracy Independently.      When pt uses slow speech; pt is intelligible.    Pain/Comfort:  Pain Rating 1: 3/10    Respiratory Status: Room air    Objective:     Has the patient been evaluated by SLP for swallowing?   No  Keep patient NPO? No   Current Respiratory Status:            Goals:   Multidisciplinary Problems       SLP Goals       Not on file                    Plan:     Patient to be seen:  3 x/week   Plan of Care expires:  01/31/25  Plan of Care reviewed with:  patient   SLP Follow-Up:  Yes       Discharge recommendations:  Moderate Intensity Therapy   Barriers to Discharge:  Safety Awareness      Time Tracking:     SLP Treatment Date:   01/28/25  Speech Start Time:  1635  Speech Stop Time:  1728     Speech Total Time (min):  53 min    Billable Minutes: Speech Therapy Individual  "     01/28/2025    Jenni Corado MSCCC-SLP

## 2025-01-29 PROCEDURE — 11000004 HC SNF PRIVATE

## 2025-01-29 PROCEDURE — 97110 THERAPEUTIC EXERCISES: CPT | Mod: CQ

## 2025-01-29 PROCEDURE — 25000003 PHARM REV CODE 250: Performed by: FAMILY MEDICINE

## 2025-01-29 PROCEDURE — 97110 THERAPEUTIC EXERCISES: CPT

## 2025-01-29 PROCEDURE — 94761 N-INVAS EAR/PLS OXIMETRY MLT: CPT

## 2025-01-29 RX ADMIN — DONEPEZIL HYDROCHLORIDE 5 MG: 5 TABLET ORAL at 09:01

## 2025-01-29 RX ADMIN — APIXABAN 2.5 MG: 2.5 TABLET, FILM COATED ORAL at 08:01

## 2025-01-29 RX ADMIN — ATORVASTATIN CALCIUM 20 MG: 20 TABLET, FILM COATED ORAL at 08:01

## 2025-01-29 RX ADMIN — HYDROCODONE BITARTRATE AND ACETAMINOPHEN 1 TABLET: 5; 325 TABLET ORAL at 01:01

## 2025-01-29 RX ADMIN — ASPIRIN 81 MG CHEWABLE TABLET 81 MG: 81 TABLET CHEWABLE at 08:01

## 2025-01-29 RX ADMIN — POTASSIUM CHLORIDE 10 MEQ: 750 TABLET, FILM COATED, EXTENDED RELEASE ORAL at 08:01

## 2025-01-29 RX ADMIN — APIXABAN 2.5 MG: 2.5 TABLET, FILM COATED ORAL at 09:01

## 2025-01-29 RX ADMIN — THERA TABS 1 TABLET: TAB at 08:01

## 2025-01-29 RX ADMIN — POTASSIUM CHLORIDE 10 MEQ: 750 TABLET, FILM COATED, EXTENDED RELEASE ORAL at 09:01

## 2025-01-29 NOTE — PT/OT/SLP PROGRESS
Occupational Therapy   Treatment    Name: Vy Hussein  MRN: [id number]  Admitting Diagnosis:  Muscular weakness       Recommendations:     Discharge Recommendations: Moderate Intensity Therapy  Discharge Equipment Recommendations:  walker, rolling  Barriers to discharge:       Assessment:     Vy Hussein is a 84 y.o. female with a medical diagnosis of Muscular weakness. Performance deficits affecting function are weakness, impaired endurance, impaired self care skills, impaired functional mobility, impaired balance, decreased lower extremity function, decreased safety awareness.       Rehab Prognosis:  Good; patient would benefit from acute skilled OT services to address these deficits and reach maximum level of function.       Plan:   Patient has met goals and has plateaued with therapy. OT recommends discharge home with home health.       Patient to be seen 5 x/week to address the above listed problems via self-care/home management, therapeutic activities, therapeutic exercises  Plan of Care Expires:    Plan of Care Reviewed with: patient    Subjective     Chief Complaint: Decreased mobility  Patient/Family Comments/goals: increased strength  Pain/Comfort:  Pain Rating 1: 3/10    Objective:     Communicated with: RN prior to session.  Patient found up in chair with   upon OT entry to room.     General Precautions: Standard, fall    Orthopedic Precautions:   Braces:    Respiratory Status: Room air     Occupational Performance:       Functional Mobility/Transfers:  Patient completed Sit <> Stand Transfer with minimum assistance  with  rolling walker   Functional Mobility: min a with ROLLING WALKER and extra time needed    Activities of Daily Living:  Feeding S/u   Grooming S/u   Bathing Min a   UB dsg Min a   LB dsg Mod a   Toileting S/u   Toilet t/f Mod a         AMPAC 6 Click ADL: 24    Treatment & Education:  Pt educated on OT role/POC.   Importance of OOB activity with staff assistance.  Importance of  sitting up in the chair throughout the day as tolerated, especially for meals   Safety during functional t/f and mobility  Importance of assisting with self-care activities     Patient completed the following for increased strength to increase I with ADLs: 3# dowel- shoulder press, chest press, bicep curls x 40, UBE 8 min; yellow theraflex x 40 both ways, yellow theraband- scapular retraction x 40      Patient left up in chair with call button in reach and chair alarm on    GOALS:   Multidisciplinary Problems       Occupational Therapy Goals          Problem: Occupational Therapy    Goal Priority Disciplines Outcome Interventions   Occupational Therapy Goal     OT, PT/OT Progressing    Description: STG:  Pt will perform grooming with setup  Pt will bathe with Mod I  Pt will perform UE dressing with I after set up  Pt will perform LE dressing with Mod I  Pt will sit EOB x 15 min with no assistance  Pt will transfer bed/chair/bsc with Mod I  Pt will perform standing task x 15 min with supervision assistance  Pt will tolerate 45 minutes of tx without fatigue      LT.Restore to max I with self care and mobility.                         Time Tracking:     OT Date of Treatment: 25  OT Start Time: 1029  OT Stop Time: 1105  OT Total Time (min): 36 min    Billable Minutes:Therapeutic Exercise 36 min  Lizeth Negron, OTR/L            Number of [facility] visits since last OT visit: 2025

## 2025-01-29 NOTE — PLAN OF CARE
Problem: Adult Inpatient Plan of Care  Goal: Plan of Care Review  Outcome: Progressing  Goal: Optimal Comfort and Wellbeing  Outcome: Progressing     Problem: Skin Injury Risk Increased  Goal: Skin Health and Integrity  Outcome: Progressing  Intervention: Optimize Skin Protection  Flowsheets (Taken 1/29/2025 0444)  Pressure Reduction Techniques: frequent weight shift encouraged  Skin Protection:   incontinence pads utilized   protective footwear used  Activity Management: Ambulated to bathroom - L4  Intervention: Promote and Optimize Oral Intake  Flowsheets (Taken 1/29/2025 0444)  Oral Nutrition Promotion: physical activity promoted     Problem: Fall Injury Risk  Goal: Absence of Fall and Fall-Related Injury  Outcome: Progressing  Intervention: Identify and Manage Contributors  Flowsheets (Taken 1/29/2025 0444)  Self-Care Promotion:   independence encouraged   BADL personal objects within reach   BADL personal routines maintained   adaptive equipment use encouraged  Medication Review/Management: medications reviewed  Intervention: Promote Injury-Free Environment  Flowsheets (Taken 1/29/2025 0444)  Safety Promotion/Fall Prevention:   assistive device/personal item within reach   diversional activities provided   Fall Risk reviewed with patient/family   Fall Risk signage in place   lighting adjusted   medications reviewed   muscle strengthening facilitated   nonskid shoes/socks when out of bed   patient expresses understanding of fall risk and prevention   room near unit station   side rails raised x 2     Problem: Communication Impairment  Goal: Effective Communication Skills  Outcome: Progressing

## 2025-01-29 NOTE — PT/OT/SLP PROGRESS
Physical Therapy Treatment    Patient Name:  Vy Hussein   MRN:  [id number]    Recommendations:     Discharge Recommendations: Moderate Intensity Therapy  Discharge Equipment Recommendations: walker, rolling  Barriers to discharge: None    Assessment:     Vy Hussein is a 84 y.o. female admitted with a medical diagnosis of Muscular weakness.  She presents with the following impairments/functional limitations: weakness, impaired endurance, impaired self care skills, impaired functional mobility, gait instability, decreased upper extremity function, decreased lower extremity function, pain, orthopedic precautions .  Pt pleasant and participates well with treatment; all pt goals have been met-- recommend d/c from PT for this reason.    Rehab Prognosis: Good; patient would benefit from acute skilled PT services to address these deficits and reach maximum level of function.    Recent Surgery: * No surgery found *      Plan:     During this hospitalization, patient to be seen 5 x/week to address the identified rehab impairments via therapeutic exercises and progress toward the following goals:    Plan of Care Expires:  02/11/25    Subjective     Chief Complaint: none  Patient/Family Comments/goals: agrees to PT Tx  Pain/Comfort:  Pain Rating 1: 3/10      Objective:     Communicated with patient prior to session.  Patient found seated in room.       General Precautions: Standard, fall  Orthopedic Precautions: RLE weight bearing as tolerated  Braces: N/A  Respiratory Status: Room air     Functional Mobility:  Transfers:     Sit to Stand:  modified independence with rolling walker  Bed to Chair: modified independence with  rolling walker  using  Step Transfer  Gait: 150ft  with rolling walker, mod independent to SVN; pt relies on walker for support due to general weakness; slow, steady pace with reciprocal pattern  Balance: independent with sitting balance; good with walker for dynamic mobility       AM-PAC 6 CLICK  "MOBILITY  Turning over in bed (including adjusting bedclothes, sheets and blankets)?: 4  Sitting down on and standing up from a chair with arms (e.g., wheelchair, bedside commode, etc.): 4  Moving from lying on back to sitting on the side of the bed?: 4  Moving to and from a bed to a chair (including a wheelchair)?: 4  Need to walk in hospital room?: 4  Climbing 3-5 steps with a railing?: 3  Basic Mobility Total Score: 23       Treatment & Education:  Ambulation for endurance as noted above    Short Arc Quads' 2 x 10, 2#   Hip adduction with ball 30 x 3"   Sitting BLE exercises x 20 repetitions each: marching, long arc quads, seated hip rotation, 2#   Seated hip abduction 20 x red thera band  Seated Hamstring curls 20 x red thera band       Patient left sitting edge of bed with call button in reach..    GOALS:   Multidisciplinary Problems       Physical Therapy Goals          Problem: Physical Therapy    Goal Priority Disciplines Outcome Interventions   Physical Therapy Goal     PT, PT/OT Progressing    Description: Short term goals  1. Pt will require min assist for bed mobility  2. Pt will require min assist for sit to stand transfer   3. Pt will ambulate 25 feet with RW and CGA    Long term goals  1. Pt will be independent with all bed mobility  2. Pt will be independent/mod independent with sit to stand transfer  3. Pt will perform 5 sit to stands with only 1 UE for assistance  4. Pt will ambulate 50 feet with RW and CGA  5. Pt will be independent with ascending and descending 2 standard height steps with handrail                       Time Tracking:     PT Received On: 01/29/25  PT Start Time: 1250     PT Stop Time: 1320  PT Total Time (min): 30 min     Billable Minutes:  Therapeutic exercise 30     Treatment Type: Treatment  PT/PTA: PTA     Number of PTA visits since last PT visit: 1     Continue Plan of Care per PT order to progress patient toward rehab goals as tolerated by patient.   PERLA Rosenthal "   01/29/2025

## 2025-01-29 NOTE — PLAN OF CARE
Problem: Adult Inpatient Plan of Care  Goal: Patient-Specific Goal (Individualized)  Outcome: Progressing  Goal: Optimal Comfort and Wellbeing  Outcome: Progressing  Intervention: Monitor Pain and Promote Comfort  Flowsheets (Taken 1/29/2025 1427)  Pain Management Interventions:   care clustered   pillow support provided   position adjusted   medication offered  Intervention: Provide Person-Centered Care  Flowsheets (Taken 1/29/2025 1427)  Trust Relationship/Rapport:   care explained   choices provided   emotional support provided   empathic listening provided   questions answered   questions encouraged   reassurance provided   thoughts/feelings acknowledged     Problem: Fall Injury Risk  Goal: Absence of Fall and Fall-Related Injury  Outcome: Progressing  Intervention: Identify and Manage Contributors  Flowsheets (Taken 1/29/2025 1427)  Self-Care Promotion:   independence encouraged   BADL personal objects within reach   BADL personal routines maintained   meal set-up provided   adaptive equipment use encouraged  Medication Review/Management:   medications reviewed   high-risk medications identified  Intervention: Promote Injury-Free Environment  Flowsheets (Taken 1/29/2025 1427)  Safety Promotion/Fall Prevention:   assistive device/personal item within reach   Fall Risk signage in place   family expresses understanding of fall risk and prevention   high risk medications identified   in recliner, wheels locked   instructed to call staff for mobility   medications reviewed   nonskid shoes/socks when out of bed     Problem: Pain Acute  Goal: Optimal Pain Control and Function  Outcome: Progressing  Intervention: Develop Pain Management Plan  Flowsheets (Taken 1/29/2025 1427)  Pain Management Interventions:   care clustered   pillow support provided   position adjusted   medication offered  Intervention: Prevent or Manage Pain  Flowsheets (Taken 1/29/2025 1427)  Sensory Stimulation Regulation: care clustered  Bowel  Elimination Promotion:   adequate fluid intake promoted   ambulation promoted   privacy promoted  Medication Review/Management:   medications reviewed   high-risk medications identified  Intervention: Optimize Psychosocial Wellbeing  Flowsheets (Taken 1/29/2025 8723)  Supportive Measures:   active listening utilized   counseling provided   decision-making supported   goal-setting facilitated   guided imagery facilitated   journaling promoted   self-reflection promoted   self-care encouraged   relaxation techniques promoted   problem-solving facilitated   positive reinforcement provided   self-responsibility promoted   verbalization of feelings encouraged  Diversional Activities: smartphone

## 2025-01-30 LAB
ANION GAP SERPL CALCULATED.3IONS-SCNC: 10 MMOL/L (ref 7–16)
BASOPHILS # BLD AUTO: 0.11 K/UL (ref 0–0.2)
BASOPHILS NFR BLD AUTO: 1.2 % (ref 0–1)
BUN SERPL-MCNC: 32 MG/DL (ref 10–20)
BUN/CREAT SERPL: 48 (ref 6–20)
CALCIUM SERPL-MCNC: 9.9 MG/DL (ref 8.4–10.2)
CHLORIDE SERPL-SCNC: 107 MMOL/L (ref 98–107)
CO2 SERPL-SCNC: 30 MMOL/L (ref 23–31)
CREAT SERPL-MCNC: 0.67 MG/DL (ref 0.55–1.02)
DIFFERENTIAL METHOD BLD: ABNORMAL
EGFR (NO RACE VARIABLE) (RUSH/TITUS): 86 ML/MIN/1.73M2
EOSINOPHIL # BLD AUTO: 0.16 K/UL (ref 0–0.5)
EOSINOPHIL NFR BLD AUTO: 1.8 % (ref 1–4)
ERYTHROCYTE [DISTWIDTH] IN BLOOD BY AUTOMATED COUNT: 15.2 % (ref 11.5–14.5)
GLUCOSE SERPL-MCNC: 78 MG/DL (ref 82–115)
HCT VFR BLD AUTO: 35.7 % (ref 38–47)
HGB BLD-MCNC: 11.4 G/DL (ref 12–16)
IMM GRANULOCYTES # BLD AUTO: 0.02 K/UL (ref 0–0.04)
IMM GRANULOCYTES NFR BLD: 0.2 % (ref 0–0.4)
LYMPHOCYTES # BLD AUTO: 2.42 K/UL (ref 1–4.8)
LYMPHOCYTES NFR BLD AUTO: 26.6 % (ref 27–41)
MAGNESIUM SERPL-MCNC: 2.3 MG/DL (ref 1.6–2.6)
MCH RBC QN AUTO: 29.2 PG (ref 27–31)
MCHC RBC AUTO-ENTMCNC: 31.9 G/DL (ref 32–36)
MCV RBC AUTO: 91.5 FL (ref 80–96)
MONOCYTES # BLD AUTO: 0.64 K/UL (ref 0–0.8)
MONOCYTES NFR BLD AUTO: 7 % (ref 2–6)
MPC BLD CALC-MCNC: 10.3 FL (ref 9.4–12.4)
NEUTROPHILS # BLD AUTO: 5.75 K/UL (ref 1.8–7.7)
NEUTROPHILS NFR BLD AUTO: 63.2 % (ref 53–65)
NRBC # BLD AUTO: 0 X10E3/UL
NRBC, AUTO (.00): 0 %
PHOSPHATE SERPL-MCNC: 3.5 MG/DL (ref 2.3–4.7)
PLATELET # BLD AUTO: 283 K/UL (ref 150–400)
POTASSIUM SERPL-SCNC: 4.1 MMOL/L (ref 3.5–5.1)
RBC # BLD AUTO: 3.9 M/UL (ref 4.2–5.4)
SODIUM SERPL-SCNC: 143 MMOL/L (ref 136–145)
WBC # BLD AUTO: 9.1 K/UL (ref 4.5–11)

## 2025-01-30 PROCEDURE — 84100 ASSAY OF PHOSPHORUS: CPT | Performed by: FAMILY MEDICINE

## 2025-01-30 PROCEDURE — 97530 THERAPEUTIC ACTIVITIES: CPT | Mod: CQ

## 2025-01-30 PROCEDURE — 80048 BASIC METABOLIC PNL TOTAL CA: CPT | Performed by: FAMILY MEDICINE

## 2025-01-30 PROCEDURE — 94761 N-INVAS EAR/PLS OXIMETRY MLT: CPT

## 2025-01-30 PROCEDURE — 83735 ASSAY OF MAGNESIUM: CPT | Performed by: FAMILY MEDICINE

## 2025-01-30 PROCEDURE — 85025 COMPLETE CBC W/AUTO DIFF WBC: CPT | Performed by: FAMILY MEDICINE

## 2025-01-30 PROCEDURE — 25000003 PHARM REV CODE 250: Performed by: FAMILY MEDICINE

## 2025-01-30 PROCEDURE — 36415 COLL VENOUS BLD VENIPUNCTURE: CPT | Performed by: FAMILY MEDICINE

## 2025-01-30 PROCEDURE — 11000004 HC SNF PRIVATE

## 2025-01-30 PROCEDURE — 97110 THERAPEUTIC EXERCISES: CPT

## 2025-01-30 RX ADMIN — THERA TABS 1 TABLET: TAB at 08:01

## 2025-01-30 RX ADMIN — POTASSIUM CHLORIDE 10 MEQ: 750 TABLET, FILM COATED, EXTENDED RELEASE ORAL at 08:01

## 2025-01-30 RX ADMIN — ASPIRIN 81 MG CHEWABLE TABLET 81 MG: 81 TABLET CHEWABLE at 08:01

## 2025-01-30 RX ADMIN — APIXABAN 2.5 MG: 2.5 TABLET, FILM COATED ORAL at 08:01

## 2025-01-30 RX ADMIN — ATORVASTATIN CALCIUM 20 MG: 20 TABLET, FILM COATED ORAL at 08:01

## 2025-01-30 RX ADMIN — DONEPEZIL HYDROCHLORIDE 5 MG: 5 TABLET ORAL at 08:01

## 2025-01-30 RX ADMIN — HYDROCORTISONE: 25 CREAM TOPICAL at 08:01

## 2025-01-30 NOTE — PT/OT/SLP PROGRESS
Occupational Therapy   Treatment    Name: Vy Hussein  MRN: [id number]  Admitting Diagnosis:  Muscular weakness       Recommendations:     Discharge Recommendations: Moderate Intensity Therapy  Discharge Equipment Recommendations:  walker, rolling  Barriers to discharge:       Assessment:     Vy Hussein is a 84 y.o. female with a medical diagnosis of Muscular weakness. Performance deficits affecting function are weakness, impaired endurance, impaired self care skills, impaired functional mobility, impaired balance, decreased lower extremity function, decreased safety awareness.       Rehab Prognosis:  Good; patient would benefit from acute skilled OT services to address these deficits and reach maximum level of function.       Plan:   Patient has met goals and has plateaued with therapy. OT recommends discharge home with home health.       Patient to be seen 5 x/week to address the above listed problems via self-care/home management, therapeutic activities, therapeutic exercises  Plan of Care Expires:    Plan of Care Reviewed with: patient    Subjective     Chief Complaint: Decreased mobility  Patient/Family Comments/goals: increased strength  Pain/Comfort:  Pain Rating 1: 0/10    Objective:     Communicated with: RN prior to session.  Patient found up in chair with   upon OT entry to room.     General Precautions: Standard, fall    Orthopedic Precautions:   Braces:    Respiratory Status: Room air     Occupational Performance:       Functional Mobility/Transfers:  Patient completed Sit <> Stand Transfer with minimum assistance  with  rolling walker   Functional Mobility: min a with ROLLING WALKER and extra time needed    Activities of Daily Living:  Feeding S/u   Grooming S/u   Bathing Min a   UB dsg Min a   LB dsg Mod a   Toileting S/u   Toilet t/f Mod a         AMPAC 6 Click ADL: 24    Treatment & Education:  Pt educated on OT role/POC.   Importance of OOB activity with staff assistance.  Importance of  sitting up in the chair throughout the day as tolerated, especially for meals   Safety during functional t/f and mobility  Importance of assisting with self-care activities     Patient completed the following for increased strength to increase I with ADLs: 3# dowel- shoulder press, chest press, bicep curls x 40, UBE 8 min; yellow theraflex x 40 both ways, yellow theraband- scapular retraction x 40      Patient left up in chair with call button in reach and chair alarm on    GOALS:   Multidisciplinary Problems       Occupational Therapy Goals          Problem: Occupational Therapy    Goal Priority Disciplines Outcome Interventions   Occupational Therapy Goal     OT, PT/OT Progressing    Description: STG:  Pt will perform grooming with setup  Pt will bathe with Mod I  Pt will perform UE dressing with I after set up  Pt will perform LE dressing with Mod I  Pt will sit EOB x 15 min with no assistance  Pt will transfer bed/chair/bsc with Mod I  Pt will perform standing task x 15 min with supervision assistance  Pt will tolerate 45 minutes of tx without fatigue      LT.Restore to max I with self care and mobility.                         Time Tracking:     OT Date of Treatment: 25  OT Start Time: 852  OT Stop Time: 926  OT Total Time (min): 34 min    Billable Minutes:Therapeutic Exercise 34 min  Lizeth Negron, OTR/L            Number of PHILIP visits since last OT visit: 2025

## 2025-01-30 NOTE — PLAN OF CARE
[facility]  Medical Surgical Unit - Swing Bed   Interdisciplinary Team Meeting    Patient: Vy Hussein   Today's Date: 1/30/2025   Estimated D/C Date: 2/1/2025       Physician: Ema Chamorro MD Unit Director: Alexa Sood RN   Pharmacy: Twanyanaye Haro, PharmD Nursing: JAZZY Sweeney RN   : Nicky Magana RN Physical/Occupational Therapy: Lizeth Negorn OT, VIPIN San PT   Speech Therapy: ST Mendoza Respiratory: See respiratory notes   Dietary: See dietary notes    Other: ELIZABETH Can, RT      Nursing  New Symptoms/Problems: none at this time                                       Urine: incontinent  Judd: No   Bowel: continent  Last Bowel Movement: 01/29/25   Constipated: No  Diarrhea: yes   Isolation: No  Cognition: WNL  Aspiration Precautions: No  Wound Care: No  Wound Location/Tx: na  Comment(s): na      Respiratory  O2 Device: Room Air  O2 Flow: na  SpO2: 95%  Neb Tx: No  Comment(s): na      Dietary  Nutrition:  mechanical soft  Comment(s): na     Speech Therapy  Speech/Swallowing: Speech difficulty  Comment(s): expressive aphasia     Physical Therapy  Gait/Assistive Device: 150ft with rolling walker, mod independent to SVN; pt relies on walker for support due to general weakness; slow, steady pace with reciprocal pattern    1Stairs:  Pt ascended/descended 5 stair(s) and   with No Assistive Device with bilateral handrails with Stand-by Assistance and Contact Guard Assistance.      ELOS: Plan to DC tbd    Transfers: Moderate Assistance  Bed Mobility: Activity did not occur Range of Motion/Restrictions: RLE weight bearing as tolerated   Comment(s): na                 Occupational Therapy  Eating/Grooming: Supervision or Set-up Assistance Toileting: Minimal Assistance   Bathing: Minimal Assistance Dressing (Upper Body): Minimal Assistance   Dressing (Lower Body): Moderate Assistance    Comment(s): na   Activity Therapy  Level of participation: Active participation  Comment(s):  adam     Pharmacy  Medication Changes: No  Labs Reviewed: Yes  New Lab Orders: No  Comment(s): labs q mon/thurs       Tx Plan/Recommendations reviewed with family and/or patient on 1/30/25.  Additional family Conference/Training: adam  D/C Plan/Recommendations: Home with HH and Home with family  REESE: 2/1/25  Comment(s): Nicki SORIANO

## 2025-01-30 NOTE — PLAN OF CARE
Problem: Adult Inpatient Plan of Care  Goal: Plan of Care Review  Outcome: Progressing  Goal: Patient-Specific Goal (Individualized)  Outcome: Progressing     Problem: Fall Injury Risk  Goal: Absence of Fall and Fall-Related Injury  Outcome: Progressing  Intervention: Identify and Manage Contributors  Flowsheets (Taken 1/30/2025 0332)  Self-Care Promotion:   independence encouraged   BADL personal objects within reach   BADL personal routines maintained  Medication Review/Management:   medications reviewed   high-risk medications identified  Intervention: Promote Injury-Free Environment  Flowsheets (Taken 1/30/2025 7893)  Safety Promotion/Fall Prevention:   assistive device/personal item within reach   bed alarm refused   diversional activities provided   Fall Risk reviewed with patient/family   Fall Risk signage in place   family expresses understanding of fall risk and prevention   high risk medications identified   in recliner, wheels locked   instructed to call staff for mobility   medications reviewed   muscle strengthening facilitated   nonskid shoes/socks when out of bed   side rails raised x 3   patient expresses understanding of fall risk and prevention

## 2025-01-30 NOTE — PT/OT/SLP PROGRESS
Physical Therapy Treatment    Patient Name:  Vy Hussein   MRN:  [id number]    Recommendations:     Discharge Recommendations: Moderate Intensity Therapy  Discharge Equipment Recommendations: walker, rolling  Barriers to discharge: None    Assessment:     Vy Hussein is a 84 y.o. female admitted with a medical diagnosis of Muscular weakness.  She presents with the following impairments/functional limitations: weakness, impaired endurance, impaired self care skills, impaired functional mobility, gait instability, impaired balance, decreased upper extremity function, decreased lower extremity function, orthopedic precautions .  Pt pleasant and participates well with treatment; all pt goals have been met-- recommend d/c from PT for this reason.    Rehab Prognosis: Good; patient would benefit from acute skilled PT services to address these deficits and reach maximum level of function.    Recent Surgery: * No surgery found *      Plan:     During this hospitalization, patient to be seen 5 x/week to address the identified rehab impairments via therapeutic activities and progress toward the following goals:    Plan of Care Expires:  02/11/25    Subjective     Chief Complaint: none  Patient/Family Comments/goals: agrees to PT Tx  Pain/Comfort:  Pain Rating 1: 0/10      Objective:     Communicated with patient prior to session.  Patient found seated in rehab gym after completing occupational therapy treatment session.       General Precautions: Standard, fall  Orthopedic Precautions: RLE weight bearing as tolerated  Braces: N/A  Respiratory Status: Room air     Functional Mobility:  Transfers:     Sit to Stand:  modified independence with rolling walker  Bed to Chair: modified independence with  rolling walker  using  Step Transfer  Gait: 300ft  with rolling walker, mod independent to SVN; pt relies on walker for support due to general weakness; slow, steady pace with reciprocal pattern  Balance: independent with  "sitting balance; good with walker for dynamic mobility       AM-PAC 6 CLICK MOBILITY  Turning over in bed (including adjusting bedclothes, sheets and blankets)?: 4  Sitting down on and standing up from a chair with arms (e.g., wheelchair, bedside commode, etc.): 4  Moving from lying on back to sitting on the side of the bed?: 4  Moving to and from a bed to a chair (including a wheelchair)?: 4  Need to walk in hospital room?: 4  Climbing 3-5 steps with a railing?: 3  Basic Mobility Total Score: 23       Treatment & Education:  Ambulation for endurance as noted above    Seated hip abduction with yellow tband 30 x   Seated hip adduction with ball 30 x 3"   Therapeutic activities 2 x 10 with patient standing at walker:  heel raises, marching, sit to stand repeats    Patient left sitting edge of bed with call button in reach..    GOALS:   Multidisciplinary Problems       Physical Therapy Goals          Problem: Physical Therapy    Goal Priority Disciplines Outcome Interventions   Physical Therapy Goal     PT, PT/OT Progressing    Description: Short term goals  1. Pt will require min assist for bed mobility  2. Pt will require min assist for sit to stand transfer   3. Pt will ambulate 25 feet with RW and CGA    Long term goals  1. Pt will be independent with all bed mobility  2. Pt will be independent/mod independent with sit to stand transfer  3. Pt will perform 5 sit to stands with only 1 UE for assistance  4. Pt will ambulate 50 feet with RW and CGA  5. Pt will be independent with ascending and descending 2 standard height steps with handrail                       Time Tracking:     PT Received On: 01/30/25  PT Start Time: 0933     PT Stop Time: 1005  PT Total Time (min): 32 min     Billable Minutes:  Therapeutic activities 32    Treatment Type: Treatment  PT/PTA: PTA     Number of PTA visits since last PT visit: 2     Continue Plan of Care per PT order to progress patient toward rehab goals as tolerated by patient. "   PERLA Rosenthal   01/30/2025

## 2025-01-30 NOTE — NURSING
01/30/25 0750   Type of Frequent Check   Type Patient Rounds  (Sitting at edge of bed. NADN. No c/o pain. No report of SOB or trouble breathing. No needs voiced. CB in reach, encouraged to call for help, verbalizes understanding. Bed low, alarm on. No family at bedside.)   Safety/Activity   Patient Rounds bed in low position;bed wheels locked;call light in patient/parent reach;clutter free environment maintained;ID band on;placement of personal items at bedside;visualized patient   Safety Promotion/Fall Prevention assistive device/personal item within reach;diversional activities provided;Fall Risk reviewed with patient/family;high risk medications identified;medications reviewed;muscle strengthening facilitated;nonskid shoes/socks when out of bed;side rails raised x 3;instructed to call staff for mobility   Safety Precautions limb precautions maintained   Safety Bands on Patient Fall Risk Band   Activity Management Sitting at edge of bed - L2   Activity Assistance Provided assistance, 1 person   Additional Documentation All Alarms (Row)   Enhanced Safety Measures bed alarm set   Positioning   Body Position position changed independently;position maintained;sitting up in bed   Head of Bed (HOB) Positioning HOB elevated   Positioning/Transfer Devices pillows;in use   Skin Interventions   Device Skin Pressure Protection absorbent pad utilized/changed   Pressure Reduction Techniques frequent weight shift encouraged

## 2025-01-30 NOTE — PLAN OF CARE
Pt and pt's nephew Solo notified of NOMNC issued by insurance. Pt will discharge on 2/1/25. Pt signed and signed copy faxed back to insurance company.

## 2025-01-30 NOTE — NURSING
01/30/25 0749   Handoff Report   Received From ELIZABETH Pedraza RN   Given To EVERT Solis RN   Pain/Comfort/Sleep   Pain Rating (0-10): Rest 0   Pain Rating: Rest 0 - no pain   Cognitive   Cognitive/Neuro/Behavioral WDL ex;speech   Level of Consciousness (AVPU) alert   Neuro   Additional Documentation Deepthi Coma Scale (Group)   Pupils   Pupil PERRLA yes   [place] Coma Scale   Best Eye Response 4-->(E4) spontaneous   Best Motor Response 6-->(M6) obeys commands   Best Verbal Response 5-->(V5) oriented   Deepthi Coma Scale Score 15   Speech WDL   Speech [WDL Definition: Moderate rate and volume; clear, coherent; articulate; effective] WDL except   HEENT   HEENT WDL ex;vision aid   Vision Aid glasses at bedside   Respiratory   Respiratory WDL WDL   Oxygen Therapy   Device (Oxygen Therapy) room air   Cardiac   Cardiac WDL WDL   Peripheral Neurovascular   Peripheral Neurovascular WDL ex;edema   VTE Core Measure Pharmacological prophylaxis initiated/maintained   VTE Prevention/Management ambulation promoted   Edema   Ankle, Left Edema 2+ (Mild)   Leg, Right Edema 2+ (Mild)   Foot, Left Edema 2+ (Mild)   Leg, Left Edema 2+ (Mild)   Ankle, Right Edema 2+ (Mild)   Foot, Right Edema 2+ (Mild)   Hip, Right Edema 1+ (Trace)   Knee, Right Edema 2+ (Mild)   Knee, Left Edema 2+ (Mild)   Skin   Skin WDL color/characteristics   General Skin Color/Characteristics bruised (ecchymotic)   Specialty Bed/Overlay Overlay, nonpowered/static (waffle)   Bed Support Surface Assessed   Adryan Risk Assessment   Sensory Perception 3-->slightly limited   Moisture 4-->rarely moist   Activity 3-->walks occasionally   Mobility 3-->slightly limited   Nutrition 4-->excellent   Friction and Shear 3-->no apparent problem   Adryan Score 20   Pressure Injury Prevention    Check Moisture Management Pad Done   Heel protection technique Pillow        Wound 12/25/24 1930 Incision Right anterior;lateral Thigh   Date First Assessed/Time First Assessed: 12/25/24 1930    Present on Original Admission: Yes  Primary Wound Type: Incision  Side: Right  Orientation: anterior;lateral  Location: Thigh   Dressing Appearance Open to air   Drainage Amount None   Appearance Intact;[place]   Periwound Area Intact   Wound Edges Approximated   Skin Interventions   Device Skin Pressure Protection absorbent pad utilized/changed   Pressure Reduction Devices chair cushion utilized   Pressure Reduction Techniques frequent weight shift encouraged   Musculoskeletal   Musculoskeletal WDL ex;mobility   General Mobility generalized weakness   Right Joint Tenderness hip   Right Joint Swelling hip   Extremity Movement RLE   RLE Extremity Movement active ROM mildly impaired   Functional Screen (every 3 days/change)   Ambulation 1 - assistive equipment   Transferring 1 - assistive equipment   Toileting 1 - assistive equipment   Bathing 2 - assistive person   Dressing 2 - assistive person   Eating 0 - independent   Communication 2 - difficulty speaking (not related to language barrier)   Swallowing 0 - swallows foods/liquids without difficulty   Gastrointestinal   GI WDL ex;bowel sounds   All Quadrants Bowel Sounds hypoactive   Last Bowel Movement 01/29/25   Genitourinary   Genitourinary WDL ex;voiding ability/characteristics   Voiding Characteristics dribbling   Psychosocial Support   Family/Support System Care self-care encouraged   Diversional Activities smartphone;reading;television   Nutrition   Diet/Nutrition Received mechanical/dental soft   Diet/Feeding Assistance tray set-up   Diet/Feeding Tolerance good   Fluid Intake adequate   Safety   Safety WDL WDL   Safety Factors upper side rails raised x 2, lower side rail raised x 1;side rails raised x 3;bed in low position;wheels locked;call light in reach;upper side rails raised x 2;ID band on   Enhanced Safety Measures bed alarm set   Additional Documentation All Alarms (Row)   Fall Risk Assessment (every shift)   History Of Fall (W/I 3 Mos) 4-->Yes    Polypharmacy 3-->Yes   Central Nervous System/Psychotropic Medication 0-->No   Cardiovascular Medication 3-->Yes   Age Greater Than 65 Years 2-->Yes   Altered Elimination 0-->No   Cognitive Deficit 0-->No   Sensory Deficit 2-->Yes   Dizziness/Vertigo 0-->No   Depression 0-->No   Mobility Deficit/Weakness 2-->Yes   Male 0-->No   Fall Risk Score 16   ABC Risk for Fall with Injury Assessment   A= Age: Is the patient greater than or equal to 85 years old or frail due to clinical condition? No   B=Bones: Does the patient have osteoporosis, previous fracture, prolonged steroid use, or metastatic bone cancer? No   C=Coagulation Disorders: Does the patient have a bleeding disorder, either through anticoagulants or underlying clinical condition? No   S=recent Surgery: Is the patient post-op surgicalwith a recent lower limb amputation or recent major abdominal or thoracic surgery? No   Safety Management   Safety Promotion/Fall Prevention assistive device/personal item within reach;bed alarm set;diversional activities provided;Fall Risk reviewed with patient/family;Fall Risk signage in place;family expresses understanding of fall risk and prevention;high risk medications identified;in recliner, wheels locked;instructed to call staff for mobility;medications reviewed;muscle strengthening facilitated;nonskid shoes/socks when out of bed;observed patient noncompliance with fall prevention instructions;side rails raised x 3;patient expresses understanding of fall risk and prevention   Medication Review/Management medications reviewed;high-risk medications identified   Patient Rounds bed in low position;bed wheels locked;call light in patient/parent reach;clutter free environment maintained;ID band on;placement of personal items at bedside;visualized patient   Safety Bands on Patient Fall Risk Band   Elopement Precautions bed alarm   Daily Care   Activity Management Sitting at edge of bed - L2   Activity Assistance Provided  independent   Assistive Device Utilized walker   Mobility   GEMS ([place] Early Mobility Scale) Level 4-Independent activities   Positioning   Body Position position changed independently;position maintained;sitting up in bed   Head of Bed (HOB) Positioning HOB elevated   Positioning/Transfer Devices pillows;in use   Daily Care Interventions   Self-Care Promotion independence encouraged;BADL personal objects within reach;BADL personal routines maintained   RN Clinical Review   I have evaluated the data collected on this patient and nursing care provided. Done

## 2025-01-31 PROCEDURE — 25000003 PHARM REV CODE 250: Performed by: FAMILY MEDICINE

## 2025-01-31 PROCEDURE — 94761 N-INVAS EAR/PLS OXIMETRY MLT: CPT

## 2025-01-31 PROCEDURE — 92507 TX SP LANG VOICE COMM INDIV: CPT

## 2025-01-31 PROCEDURE — 97530 THERAPEUTIC ACTIVITIES: CPT | Mod: CQ

## 2025-01-31 PROCEDURE — 11000004 HC SNF PRIVATE

## 2025-01-31 PROCEDURE — 99308 SBSQ NF CARE LOW MDM 20: CPT | Mod: ,,, | Performed by: FAMILY MEDICINE

## 2025-01-31 PROCEDURE — 97110 THERAPEUTIC EXERCISES: CPT

## 2025-01-31 RX ORDER — POTASSIUM CHLORIDE 750 MG/1
10 TABLET, EXTENDED RELEASE ORAL 2 TIMES DAILY
Qty: 60 TABLET | Refills: 0 | Status: SHIPPED | OUTPATIENT
Start: 2025-01-31

## 2025-01-31 RX ORDER — HYDROCODONE BITARTRATE AND ACETAMINOPHEN 5; 325 MG/1; MG/1
1 TABLET ORAL EVERY 6 HOURS PRN
Qty: 15 TABLET | Refills: 0 | Status: SHIPPED | OUTPATIENT
Start: 2025-01-31

## 2025-01-31 RX ADMIN — POTASSIUM CHLORIDE 10 MEQ: 750 TABLET, FILM COATED, EXTENDED RELEASE ORAL at 08:01

## 2025-01-31 RX ADMIN — APIXABAN 2.5 MG: 2.5 TABLET, FILM COATED ORAL at 08:01

## 2025-01-31 RX ADMIN — DONEPEZIL HYDROCHLORIDE 5 MG: 5 TABLET ORAL at 08:01

## 2025-01-31 RX ADMIN — HYDROCORTISONE: 25 CREAM TOPICAL at 08:01

## 2025-01-31 RX ADMIN — ATORVASTATIN CALCIUM 20 MG: 20 TABLET, FILM COATED ORAL at 08:01

## 2025-01-31 RX ADMIN — ASPIRIN 81 MG CHEWABLE TABLET 81 MG: 81 TABLET CHEWABLE at 08:01

## 2025-01-31 RX ADMIN — THERA TABS 1 TABLET: TAB at 08:01

## 2025-01-31 NOTE — PLAN OF CARE
Pt's insurance is out of network with [facility]. Referral sent to [employer].

## 2025-01-31 NOTE — PLAN OF CARE
[facility]  Discharge Final Note    Primary Care Provider: Flora Brennan MD    Expected Discharge Date: 2/1/2025    Final Discharge Note (most recent)       Final Note - 01/31/25 0851          Final Note    Assessment Type Final Discharge Note (P)      Anticipated Discharge Disposition Home-Health Care Svc (P)      What phone number can be called within the next 1-3 days to see how you are doing after discharge? [phone] (P)      Hospital Resources/Appts/Education Provided Provided patient/caregiver with written discharge plan information;Provided education on problems/symptoms using teachback;Appointments scheduled and added to AVS (P)         Post-Acute Status    Post-Acute Authorization Home Health (P)      Home Health Status Referrals Sent (P)      Coverage Medicare (P)      Patient choice form signed by patient/caregiver List from CMS Compare (P)      Discharge Delays None known at this time (P)                      Important Message from Medicare           Pt is discharging home with family and home health. Pt already owns the needed DMEs.

## 2025-01-31 NOTE — NURSING
Patient Rounds  In chair looking at cell phone. NADN. No needs voiced. CB in reach, encouraged to call for help, verbalizes understanding.   by Nadine Solis RN at 1/30/25 1810   Patient Rounds   by Heidy Manzanares, Patient Care Assistant at 1/30/25 1808   Patient Rounds  In chair awake. NADN. Supper tray delivered/set up. No needs voiced. CB in reach, encouraged to call for help, verbalizes understanding. Refuses chair alarm   by Nadine Solis, CELSO at 1/30/25 1705   Patient Rounds  Lying in bed eyes closed, NADN. Resp even/nonlabored.CB in reach. No family at bedside.   by Nadine Solis RN at 1/30/25 1551   Patient Rounds  In chair looking at cell phone. NADN. No needs voiced. CB in reach, encouraged to call fro help, verbalizes understanding. Chair alarm on. No family at bedside.   by Nadine Solis RN at 1/30/25 1355   Patient Rounds   by Heidy Manzanares, Patient Care Assistant at 1/30/25 1215   Patient Rounds  In chair awake. NADN. Lunch tray delivered/set up. No needs voiced. CB in reach, encouraged to call for help, verbalizes understanding.  Chair alarm on. No family at bedside   by Nadine Solis RN at 1/30/25 1105   Patient Rounds   by Heidy Manzanares, Patient Care Assistant at 1/30/25 1024   Patient Rounds  New sacral border foam applied   by Nadine Solis RN at 1/30/25 1009   Patient Rounds  Patient out of the room to therapy   by Nadine Solis RN at 1/30/25 0951   Patient Rounds  Routine meds given   by Nadine Solis RN at 1/30/25 0840   Patient Rounds   by Heidy Manzanares, Patient Care Assistant at 1/30/25 0830   Patient Rounds  Sitting at edge of bed. NADN. No c/o pain. No report of SOB or trouble breathing. No needs voiced. CB in reach, encouraged to call for help, verbalizes understanding. Bed low, alarm on. No family at bedside.   by Nadine Solis, RN at 1/30/25 0750

## 2025-01-31 NOTE — SUBJECTIVE & OBJECTIVE
Interval History: to go home tomorrow.    Review of Systems  Objective:     Vital Signs (Most Recent):  Temp: 97.5 °F (36.4 °C) (01/31/25 0748)  Pulse: 84 (01/31/25 0748)  Resp: 18 (01/31/25 0748)  BP: 110/66 (01/31/25 0748)  SpO2: 95 % (01/31/25 0748) Vital Signs (24h Range):  Temp:  [97.5 °F (36.4 °C)-98.5 °F (36.9 °C)] 97.5 °F (36.4 °C)  Pulse:  [68-84] 84  Resp:  [18-20] 18  SpO2:  [95 %-98 %] 95 %  BP: (110-143)/(66-81) 110/66     Weight: 39.7 kg (87 lb 8.4 oz)  Body mass index is 14.13 kg/m².    Intake/Output Summary (Last 24 hours) at 1/31/2025 0845  Last data filed at 1/31/2025 0756  Gross per 24 hour   Intake 1100 ml   Output --   Net 1100 ml         Physical Exam        Significant Labs: All pertinent labs within the past 24 hours have been reviewed.    Significant Imaging: I have reviewed all pertinent imaging results/findings within the past 24 hours.

## 2025-01-31 NOTE — PLAN OF CARE
Pt continues to make progress with using slow speech for speech intelligiblity.    Jenni Corado MSCCC-SLP

## 2025-01-31 NOTE — PT/OT/SLP PROGRESS
"Speech Language Pathology Treatment    Patient Name:  Vy Hussein   MRN:  [id number]  Admitting Diagnosis: Muscular weakness    Recommendations:                 General Recommendations:  Speech/language therapy  Diet recommendations:   ,     Aspiration Precautions:       General Precautions: Standard,    Communication strategies:  provide increased time to answer and go to room if call light pushed    Assessment:     Vy Hussein is a 84 y.o. female with an SLP diagnosis of Aphasia.  She presents with expressive aphasia.    Subjective       Patient goals: Patient will be Independently oriented x4, 5/5 therapy sessions.   Patient answered "wh" questions with 80% accuracy Independently.   Patient completed word finding tasks with 80% accuracy Independently.   Patient completed divergent naming tasks with 80% accuracy Independently.   Patient completed convergent naming tasks with 80% accuracy Independently.   Patient completed problem solving tasks with 80% accuracy Independently.   Patient completed verbal reasoning tasks with 80% accuracy Independently.   Patient completed short term memory tasks with 80% accuracy Independently.      When pt uses slow speech; pt is intelligible.    Pain/Comfort:  Pain Rating 1: 0/10    Respiratory Status: Room air    Objective:     Has the patient been evaluated by SLP for swallowing?   No  Keep patient NPO? No   Current Respiratory Status:            Goals:   Multidisciplinary Problems       SLP Goals       Not on file                    Plan:     Patient to be seen:  3 x/week   Plan of Care expires:  01/31/25  Plan of Care reviewed with:  patient   SLP Follow-Up:  No       Discharge recommendations:  Moderate Intensity Therapy   Barriers to Discharge:  Safety Awareness      Time Tracking:     SLP Treatment Date:   01/31/25  Speech Start Time:  1400  Speech Stop Time:  1500     Speech Total Time (min):  60 min    Billable Minutes: Speech Therapy Individual  "     01/31/2025    Jenni Corado MSCCC-SLP

## 2025-01-31 NOTE — PT/OT/SLP PROGRESS
Occupational Therapy   Treatment    Name: Vy Hussein  MRN: [id number]  Admitting Diagnosis:  Muscular weakness       Recommendations:     Discharge Recommendations: Moderate Intensity Therapy  Discharge Equipment Recommendations:  walker, rolling  Barriers to discharge:       Assessment:     Vy Hussein is a 84 y.o. female with a medical diagnosis of Muscular weakness. Performance deficits affecting function are weakness, impaired endurance, impaired self care skills, impaired functional mobility, impaired balance, decreased lower extremity function, decreased safety awareness.       Rehab Prognosis:  Good; patient would benefit from acute skilled OT services to address these deficits and reach maximum level of function.       Plan:   Patient has met goals and has plateaued with therapy. OT recommends discharge home with home health.       Patient to be seen 5 x/week to address the above listed problems via therapeutic exercises, therapeutic activities, self-care/home management  Plan of Care Expires:    Plan of Care Reviewed with: patient    Subjective     Chief Complaint: Decreased mobility  Patient/Family Comments/goals: increased strength  Pain/Comfort:  Pain Rating 1: 0/10    Objective:     Communicated with: RN prior to session.  Patient found up in chair with   upon OT entry to room.     General Precautions: Standard, fall    Orthopedic Precautions:   Braces:    Respiratory Status: Room air     Occupational Performance:       Functional Mobility/Transfers:  Patient completed Sit <> Stand Transfer with minimum assistance  with  rolling walker   Functional Mobility: min a with ROLLING WALKER and extra time needed    Activities of Daily Living:  Feeding S/u   Grooming S/u   Bathing Min a   UB dsg Min a   LB dsg Mod a   Toileting S/u   Toilet t/f Mod a         AMPAC 6 Click ADL: 24    Treatment & Education:  Pt educated on OT role/POC.   Importance of OOB activity with staff assistance.  Importance of  sitting up in the chair throughout the day as tolerated, especially for meals   Safety during functional t/f and mobility  Importance of assisting with self-care activities     Patient completed the following for increased strength to increase I with ADLs: 3# dowel- shoulder press, chest press, bicep curls x 40, UBE 8 min; yellow theraflex x 40 both ways, yellow theraband- scapular retraction x 40      Patient left up in chair with call button in reach and chair alarm on    GOALS:   Multidisciplinary Problems       Occupational Therapy Goals          Problem: Occupational Therapy    Goal Priority Disciplines Outcome Interventions   Occupational Therapy Goal     OT, PT/OT Progressing    Description: STG:  Pt will perform grooming with setup  Pt will bathe with Mod I  Pt will perform UE dressing with I after set up  Pt will perform LE dressing with Mod I  Pt will sit EOB x 15 min with no assistance  Pt will transfer bed/chair/bsc with Mod I  Pt will perform standing task x 15 min with supervision assistance  Pt will tolerate 45 minutes of tx without fatigue      LT.Restore to max I with self care and mobility.                         Time Tracking:     OT Date of Treatment: 25  OT Start Time: 1300  OT Stop Time: 1336  OT Total Time (min): 36 min    Billable Minutes:Therapeutic Exercise 36 min  Lizeth Negron, OTR/L            Number of PHILIP visits since last OT visit: 2025

## 2025-01-31 NOTE — PT/OT/SLP DISCHARGE
Speech Language Pathology Discharge Summary    Vy Hussein  MRN: [id number]   Muscular weakness     Date of Last Treatment Session: 01/31/2025    Past Medical History:   Diagnosis Date    A-fib     Aneurysm of carotid artery     Stroke        Status at initiation of therapy: moderate therapy progress    Treatment Area(s):  Motor Speech    Goals:   Multidisciplinary Problems       SLP Goals       Not on file                    Participation in Treatment (at discharge):  Cooperative    Functional Status at time of Discharge:    Cognition: Patient demonstrates no cognitive deficits.    Communication: Patient demonstrates no communication deficits.    Language: Patient demonstrates no language deficits.    Motor Speech: Patient demonstrates moderate motor speech deficits.    Swallow: Patient demonstrates no dysphagia.                     Clinical Bedside assessment was not completed                       Instrumental assessment was not completed                                Swallowing Guidelines:                 no help required and meals    Speaking Valve: Patient was not discharged with speaking valve. Communication is functional at the conversational level                         Guidelines:     Patient is discharged to Home with home health               Jenni AGUILARCC-SLP  01/31/2025

## 2025-02-01 VITALS
SYSTOLIC BLOOD PRESSURE: 103 MMHG | DIASTOLIC BLOOD PRESSURE: 63 MMHG | TEMPERATURE: 98 F | WEIGHT: 87.5 LBS | OXYGEN SATURATION: 97 % | HEIGHT: 66 IN | RESPIRATION RATE: 18 BRPM | BODY MASS INDEX: 14.06 KG/M2 | HEART RATE: 81 BPM

## 2025-02-01 PROCEDURE — 99315 NF DSCHRG MGMT 30 MIN/LESS: CPT | Mod: ,,, | Performed by: FAMILY MEDICINE

## 2025-02-01 PROCEDURE — 25000003 PHARM REV CODE 250: Performed by: FAMILY MEDICINE

## 2025-02-01 PROCEDURE — 94761 N-INVAS EAR/PLS OXIMETRY MLT: CPT

## 2025-02-01 RX ADMIN — THERA TABS 1 TABLET: TAB at 08:02

## 2025-02-01 RX ADMIN — ATORVASTATIN CALCIUM 20 MG: 20 TABLET, FILM COATED ORAL at 08:02

## 2025-02-01 RX ADMIN — ASPIRIN 81 MG CHEWABLE TABLET 81 MG: 81 TABLET CHEWABLE at 08:02

## 2025-02-01 RX ADMIN — POTASSIUM CHLORIDE 10 MEQ: 750 TABLET, FILM COATED, EXTENDED RELEASE ORAL at 08:02

## 2025-02-01 RX ADMIN — APIXABAN 2.5 MG: 2.5 TABLET, FILM COATED ORAL at 08:02

## 2025-02-01 NOTE — DISCHARGE INSTRUCTIONS
You have Prescriptions sent into your pharmacy to . Home Health services set up. Follow up with your PCP within 3-5 days.

## 2025-02-01 NOTE — NURSING
Pt rolled in wheel chair to [facility] for discharge. Pt smiling and telling all goodbye.

## 2025-02-01 NOTE — PLAN OF CARE
Problem: Adult Inpatient Plan of Care  Goal: Plan of Care Review  Outcome: Progressing  Goal: Optimal Comfort and Wellbeing  Outcome: Progressing     Problem: Fall Injury Risk  Goal: Absence of Fall and Fall-Related Injury  Outcome: Progressing  Intervention: Identify and Manage Contributors  Flowsheets (Taken 2/1/2025 0442)  Self-Care Promotion:   independence encouraged   BADL personal objects within reach   BADL personal routines maintained   adaptive equipment use encouraged  Medication Review/Management: medications reviewed  Intervention: Promote Injury-Free Environment  Flowsheets (Taken 2/1/2025 0442)  Safety Promotion/Fall Prevention:   assistive device/personal item within reach   diversional activities provided   Fall Risk reviewed with patient/family   Fall Risk signage in place   lighting adjusted   medications reviewed   muscle strengthening facilitated   nonskid shoes/socks when out of bed   patient expresses understanding of fall risk and prevention   room near unit station   side rails raised x 2     Problem: Communication Impairment  Goal: Effective Communication Skills  Outcome: Progressing  Intervention: Optimize Communication Skills  Flowsheets (Taken 2/1/2025 0442)  Communication Enhancement Strategies:   call light answered in person   extra time allowed for response

## 2025-02-03 NOTE — DISCHARGE SUMMARY
[facility]  Hospital Medicine  Discharge Summary      Patient Name: Vy Hussein  MRN: [id number]  ARBEN: [id number]  Patient Class: IP- Swing  Admission Date: 12/24/2024  Hospital Length of Stay: 39 days  Discharge Date and Time: 2/1/2025 12:20 PM  Attending Physician: No att. providers found   Discharging Provider: Ema Chamorro MD  Primary Care Provider: Flora Brennan MD    Primary Care Team: Networked reference to record PCT     HPI:   No notes on file    * No surgery found *      Hospital Course:   12/27/24 - up in chair. No complaints voiced.    12/30/24 - doing well today. No complaints voiced.    1/3/25 - has a cough this AM. Has cough syrup ordered. Will continue present treatment.    1/6/25 - doing well. To have staples removed today.    1/10/25 - doing well. Will continue present treatment.    1/13/25 - doing well. Will continue present treatment.    1/14/25 - had 3 diarrheal stools last night. Orders revised.    1/15/25 - still with diarrhea. Orders revised.    1/17/25 - feels better. Hemoglobin has dropped. Orders revised.    1/20/25 - doing well. No complaints.    1/24/25 - pt. Is doing well. Will continue present treatment.    1/27/25 - doing well no complaints voiced.    1/31/25 - pt. Is going home tomorrow. Has done well. Will continue therapy at home.     Goals of Care Treatment Preferences:  Code Status: Full Code      SDOH Screening:  The patient was screened for utility difficulties, food insecurity, transport difficulties, housing insecurity, and interpersonal safety and there were no concerns identified this admission.     Consults:     No notes have been filed under this hospital service.  Service: Hospital Medicine    Final Active Diagnoses:    Diagnosis Date Noted POA    PRINCIPAL PROBLEM:  Muscular weakness [M62.81] 12/25/2024 Yes      Problems Resolved During this Admission:       Discharged Condition: stable    Disposition: Home or  Self Care    Follow Up:   Follow-up Information       Flora Brennan MD. Schedule an appointment as soon as possible for a visit.    Specialty: Family Medicine  Why: Follow up with Dr. Brennan with in 3-5 days.  Contact information:  [id number]  [facility]   [place] [id number] [phone]                           Patient Instructions:   No discharge procedures on file.    Significant Diagnostic Studies: Labs: All labs within the past 24 hours have been reviewed    Pending Diagnostic Studies:       None           Medications:  Reconciled Home Medications:      Medication List        START taking these medications      HYDROcodone-acetaminophen 5-325 mg per tablet  Commonly known as: NORCO  Take 1 tablet by mouth every 6 (six) hours as needed for Pain.     potassium chloride 10 MEQ Tbsr  Commonly known as: KLOR-CON  Take 1 tablet (10 mEq total) by mouth 2 (two) times daily.            CONTINUE taking these medications      acetaminophen 500 MG tablet  Commonly known as: TYLENOL  Take 2 tablets by mouth every 8 (eight) hours.     aspirin 81 MG Chew  Take 1 tablet by mouth once daily.     donepeziL 5 MG tablet  Commonly known as: ARICEPT  Take 1 tablet by mouth every evening.     ELIQUIS 2.5 mg Tab  Generic drug: apixaban  Take 1 tablet by mouth 2 (two) times daily.     multivitamin per tablet  Commonly known as: THERAGRAN  Take 1 tablet by mouth once daily.     polyethylene glycol 17 gram Pwpk  Commonly known as: GLYCOLAX  Take 17 g by mouth once daily.     rosuvastatin 5 MG tablet  Commonly known as: CRESTOR  Take 1 tablet by mouth once daily.            STOP taking these medications      doxycycline 100 MG Cap  Commonly known as: VIBRAMYCIN              Indwelling Lines/Drains at time of discharge:   Lines/Drains/Airways       None                   Time spent on the discharge of patient: 30 minutes         Ema Chamorro MD  Department of Hospital Medicine  [facility]

## 2025-02-25 ENCOUNTER — LAB REQUISITION (OUTPATIENT)
Dept: LAB | Facility: HOSPITAL | Age: 85
End: 2025-02-25
Attending: FAMILY MEDICINE
Payer: MEDICARE

## 2025-02-25 DIAGNOSIS — R47.1 DYSARTHRIA AND ANARTHRIA: ICD-10-CM

## 2025-02-25 DIAGNOSIS — S72.001A FRACTURE OF UNSPECIFIED PART OF NECK OF RIGHT FEMUR, INITIAL ENCOUNTER FOR CLOSED FRACTURE: ICD-10-CM

## 2025-02-25 DIAGNOSIS — I10 ESSENTIAL (PRIMARY) HYPERTENSION: ICD-10-CM

## 2025-02-25 LAB
ALBUMIN SERPL BCP-MCNC: 3.5 G/DL (ref 3.4–4.8)
ALBUMIN/GLOB SERPL: 0.8 {RATIO}
ALP SERPL-CCNC: 126 U/L (ref 40–150)
ALT SERPL W P-5'-P-CCNC: 25 U/L
ANION GAP SERPL CALCULATED.3IONS-SCNC: 14 MMOL/L (ref 7–16)
AST SERPL W P-5'-P-CCNC: 33 U/L (ref 5–34)
BASOPHILS # BLD AUTO: 0.08 K/UL (ref 0–0.2)
BASOPHILS NFR BLD AUTO: 1.1 % (ref 0–1)
BILIRUB SERPL-MCNC: 0.2 MG/DL
BUN SERPL-MCNC: 35 MG/DL (ref 10–20)
BUN/CREAT SERPL: 45 (ref 6–20)
CALCIUM SERPL-MCNC: 9.5 MG/DL (ref 8.4–10.2)
CHLORIDE SERPL-SCNC: 107 MMOL/L (ref 98–107)
CO2 SERPL-SCNC: 26 MMOL/L (ref 23–31)
CREAT SERPL-MCNC: 0.77 MG/DL (ref 0.55–1.02)
DIFFERENTIAL METHOD BLD: ABNORMAL
EGFR (NO RACE VARIABLE) (RUSH/TITUS): 76 ML/MIN/1.73M2
EOSINOPHIL # BLD AUTO: 0.15 K/UL (ref 0–0.5)
EOSINOPHIL NFR BLD AUTO: 2 % (ref 1–4)
ERYTHROCYTE [DISTWIDTH] IN BLOOD BY AUTOMATED COUNT: 14.7 % (ref 11.5–14.5)
GLOBULIN SER-MCNC: 4.3 G/DL (ref 2–4)
GLUCOSE SERPL-MCNC: 108 MG/DL (ref 82–115)
HCT VFR BLD AUTO: 35.1 % (ref 38–47)
HGB BLD-MCNC: 11.3 G/DL (ref 12–16)
IMM GRANULOCYTES # BLD AUTO: 0.03 K/UL (ref 0–0.04)
IMM GRANULOCYTES NFR BLD: 0.4 % (ref 0–0.4)
LYMPHOCYTES # BLD AUTO: 1.33 K/UL (ref 1–4.8)
LYMPHOCYTES NFR BLD AUTO: 17.9 % (ref 27–41)
MCH RBC QN AUTO: 28.8 PG (ref 27–31)
MCHC RBC AUTO-ENTMCNC: 32.2 G/DL (ref 32–36)
MCV RBC AUTO: 89.5 FL (ref 80–96)
MONOCYTES # BLD AUTO: 0.57 K/UL (ref 0–0.8)
MONOCYTES NFR BLD AUTO: 7.7 % (ref 2–6)
MPC BLD CALC-MCNC: 10.8 FL (ref 9.4–12.4)
NEUTROPHILS # BLD AUTO: 5.29 K/UL (ref 1.8–7.7)
NEUTROPHILS NFR BLD AUTO: 70.9 % (ref 53–65)
NRBC # BLD AUTO: 0 X10E3/UL
NRBC, AUTO (.00): 0 %
PLATELET # BLD AUTO: 218 K/UL (ref 150–400)
POTASSIUM SERPL-SCNC: 4.2 MMOL/L (ref 3.5–5.1)
PROT SERPL-MCNC: 7.8 G/DL (ref 5.8–7.6)
RBC # BLD AUTO: 3.92 M/UL (ref 4.2–5.4)
SODIUM SERPL-SCNC: 143 MMOL/L (ref 136–145)
WBC # BLD AUTO: 7.45 K/UL (ref 4.5–11)

## 2025-02-25 PROCEDURE — 85025 COMPLETE CBC W/AUTO DIFF WBC: CPT | Performed by: FAMILY MEDICINE

## 2025-02-25 PROCEDURE — 80053 COMPREHEN METABOLIC PANEL: CPT | Performed by: FAMILY MEDICINE

## 2025-07-02 NOTE — PLAN OF CARE
Problem: Adult Inpatient Plan of Care  Goal: Plan of Care Review  Outcome: Progressing  Goal: Patient-Specific Goal (Individualized)  Outcome: Progressing  Goal: Absence of Hospital-Acquired Illness or Injury  Outcome: Progressing  Goal: Optimal Comfort and Wellbeing  Outcome: Progressing  Goal: Readiness for Transition of Care  Outcome: Progressing     Problem: Skin Injury Risk Increased  Goal: Skin Health and Integrity  Outcome: Progressing     Problem: Fall Injury Risk  Goal: Absence of Fall and Fall-Related Injury  Outcome: Progressing  Intervention: Identify and Manage Contributors  Flowsheets (Taken 1/19/2025 1818)  Self-Care Promotion:   independence encouraged   BADL personal objects within reach   BADL personal routines maintained   meal set-up provided   adaptive equipment use encouraged  Medication Review/Management:   medications reviewed   high-risk medications identified  Intervention: Promote Injury-Free Environment  Flowsheets (Taken 1/19/2025 1818)  Safety Promotion/Fall Prevention:   assistive device/personal item within reach   bed alarm set   Fall Risk reviewed with patient/family   Fall Risk signage in place   high risk medications identified   in recliner, wheels locked   instructed to call staff for mobility   medications reviewed   nonskid shoes/socks when out of bed   patient expresses understanding of fall risk and prevention   side rails raised x 2     Problem: Communication Impairment  Goal: Effective Communication Skills  Outcome: Progressing     Problem: Pain Acute  Goal: Optimal Pain Control and Function  Outcome: Progressing      Never smoker